# Patient Record
Sex: FEMALE | Race: WHITE | NOT HISPANIC OR LATINO | Employment: OTHER | URBAN - NONMETROPOLITAN AREA
[De-identification: names, ages, dates, MRNs, and addresses within clinical notes are randomized per-mention and may not be internally consistent; named-entity substitution may affect disease eponyms.]

---

## 2017-01-27 ENCOUNTER — HOSPITAL ENCOUNTER (EMERGENCY)
Facility: HOSPITAL | Age: 72
Discharge: HOME | End: 2017-01-27
Payer: MEDICARE

## 2017-01-27 VITALS
RESPIRATION RATE: 16 BRPM | OXYGEN SATURATION: 99 % | TEMPERATURE: 98 F | SYSTOLIC BLOOD PRESSURE: 160 MMHG | DIASTOLIC BLOOD PRESSURE: 90 MMHG | HEART RATE: 62 BPM

## 2017-01-27 DIAGNOSIS — S69.92XA WRIST INJURY, LEFT, INITIAL ENCOUNTER: ICD-10-CM

## 2017-01-27 DIAGNOSIS — S00.83XA FOREHEAD CONTUSION, INITIAL ENCOUNTER: ICD-10-CM

## 2017-01-27 DIAGNOSIS — S01.81XA LACERATION OF FOREHEAD, INITIAL ENCOUNTER: ICD-10-CM

## 2017-01-27 PROCEDURE — 25000125 ZZHC RX 250

## 2017-01-27 PROCEDURE — 25000132 ZZH RX MED GY IP 250 OP 250 PS 637: Mod: GY

## 2017-01-27 PROCEDURE — 73110 X-RAY EXAM OF WRIST: CPT | Mod: TC,LT

## 2017-01-27 PROCEDURE — A9270 NON-COVERED ITEM OR SERVICE: HCPCS | Mod: GY

## 2017-01-27 PROCEDURE — 70450 CT HEAD/BRAIN W/O DYE: CPT | Mod: TC

## 2017-01-27 PROCEDURE — 90471 IMMUNIZATION ADMIN: CPT

## 2017-01-27 PROCEDURE — 90715 TDAP VACCINE 7 YRS/> IM: CPT

## 2017-01-27 PROCEDURE — 99284 EMERGENCY DEPT VISIT MOD MDM: CPT | Mod: 25

## 2017-01-27 PROCEDURE — 12011 RPR F/E/E/N/L/M 2.5 CM/<: CPT

## 2017-01-27 RX ORDER — ACETAMINOPHEN 325 MG/1
975 TABLET ORAL ONCE
Status: COMPLETED | OUTPATIENT
Start: 2017-01-27 | End: 2017-01-27

## 2017-01-27 RX ADMIN — ACETAMINOPHEN 650 MG: 325 TABLET ORAL at 11:01

## 2017-01-27 RX ADMIN — CLOSTRIDIUM TETANI TOXOID ANTIGEN (FORMALDEHYDE INACTIVATED), CORYNEBACTERIUM DIPHTHERIAE TOXOID ANTIGEN (FORMALDEHYDE INACTIVATED), BORDETELLA PERTUSSIS TOXOID ANTIGEN (GLUTARALDEHYDE INACTIVATED), BORDETELLA PERTUSSIS FILAMENTOUS HEMAGGLUTININ ANTIGEN (FORMALDEHYDE INACTIVATED), BORDETELLA PERTUSSIS PERTACTIN ANTIGEN, AND BORDETELLA PERTUSSIS FIMBRIAE 2/3 ANTIGEN 0.5 ML: 5; 2; 2.5; 5; 3; 5 INJECTION, SUSPENSION INTRAMUSCULAR at 11:27

## 2017-01-27 ASSESSMENT — ENCOUNTER SYMPTOMS
HEADACHES: 1
EYE REDNESS: 0
NECK PAIN: 0
COLOR CHANGE: 0
ABDOMINAL PAIN: 0
NECK STIFFNESS: 0
CONFUSION: 0
DIFFICULTY URINATING: 0
SHORTNESS OF BREATH: 0
BACK PAIN: 1
WOUND: 1
ARTHRALGIAS: 1
FEVER: 0

## 2017-01-27 NOTE — ED PROVIDER NOTES
History     Chief Complaint   Patient presents with     Fall     Head Laceration     laceration above right eye     Hand Pain     c/o left hand pain     HPI  Kerri Gonsales is a 71 year old female who presents to ED with granddaughter via private car for evaluation of mechanical trip and fall, head injury, right forehead laceration. Pt states she was at laundry mat this morning, tripped on a ledge, fell face forward, hit head head on the cement, left outstretched arm, wrist pain, left knee pain. She denies LOC, no visual changes, sustained 3 cm contusion and 2 1cm lacerations, left medial wrist pain, 1 cm abrasion on left knee. Denies cp or dizziness that precipitated fall. Denies n/v/d.     I have reviewed the Medications, Allergies, Past Medical and Surgical History, and Social History in the Epic system.    Review of Systems   Constitutional: Negative for fever.   HENT: Negative for congestion.    Eyes: Negative for redness.   Respiratory: Negative for shortness of breath.    Cardiovascular: Negative for chest pain.   Gastrointestinal: Negative for abdominal pain.   Genitourinary: Negative for difficulty urinating.   Musculoskeletal: Positive for back pain and arthralgias. Negative for neck pain and neck stiffness.        Chronic low back pain, left wrist and knee pain. See HPI   Skin: Positive for wound. Negative for color change.        Right forehead contusion, 2 1 cm lacerations, see HPI   Neurological: Positive for headaches.   Psychiatric/Behavioral: Negative for confusion.       Physical Exam   BP: 163/93 mmHg  Heart Rate: 67  Temp: 97.8  F (36.6  C)  Resp: 16  SpO2: 99 %  Physical Exam   Constitutional: She is oriented to person, place, and time. No distress.   HENT:   Head: Head is with contusion and with laceration.       Right Ear: Tympanic membrane normal.   Left Ear: Tympanic membrane normal.   Nose: Nose normal. No mucosal edema or rhinorrhea.   Mouth/Throat: Uvula is midline, oropharynx is clear  and moist and mucous membranes are normal.   Eyes: Conjunctivae are normal. Pupils are equal, round, and reactive to light.   Neck: Normal range of motion. Neck supple.   Cardiovascular: Normal rate and regular rhythm.    Pulmonary/Chest: Effort normal. No respiratory distress.   Abdominal: Soft. There is no tenderness.   Musculoskeletal: She exhibits edema.        Left wrist: She exhibits decreased range of motion, tenderness and swelling. She exhibits no effusion and no laceration.   Neurological: She is alert and oriented to person, place, and time. No cranial nerve deficit.   Skin: Skin is warm and dry. She is not diaphoretic.   Nursing note reviewed.      ED Course   Laceration repair  Date/Time: 1/27/2017 10:42 AM  Performed by: JACK RAMIREZ  Authorized by: JACK RAMIREZ  Consent: Verbal consent obtained.  Consent given by: patient  Patient understanding: patient states understanding of the procedure being performed  Patient identity confirmed: verbally with patient  Body area: head/neck  Location details: forehead  Laceration length: 2 cm  Local anesthetic: lidocaine 1% without epinephrine  Anesthetic total: 5 ml  Irrigation solution: saline and tap water  Amount of cleaning: standard  Skin closure: 5-0 nylon  Number of sutures: 5  Technique: simple  Dressing: 4x4 sterile gauze and antibiotic ointment  Patient tolerance: Patient tolerated the procedure well with no immediate complications      LEFT WRIST FOUR VIEWS    HISTORY:  Pain.    FINDINGS:  Bones are mildly osteopenic.  Joint spaces are well  maintained.  There is mild narrowing at the first carpometacarpal  joint.  No evidence of an acute or subacute fracture.    IMPRESSION:  MILD DEGENERATIVE CHANGES AND OSTEOPENIA WITHOUT EVIDENCE  OF ACUTE OR SUBACUTE ABNORMALITY.  Exam Date: Jan 27, 2017 11:26:42 AM  Author: IZZY ROWELL  This report is final and signed                 CT Head w/o Contrast (Final result) Result time: 01/27/17 12:06:23      Final result by Dickson Sims MD (01/27/17 12:06:23)     Narrative:         CT SCAN OF BRAIN WITHOUT CONTRAST    REPORT:  The ventricular system is normal in size.  There is a cavum  septum pellucidum, a normal variant. There are no masses, ventricular  shifts, or extracerebral collections.  Brainstem and cerebellum appear  normal.  There is complete opacification of the left maxillary sinus,  and near-complete opacification of the right maxillary sinus.  There  is opacified left ethmoid air cells.  Sphenoid and frontal sinuses are  clear.  There is soft tissue swelling seen in the right forehead.  No  fracture is seen in the skull or facial bones.    IMPRESSION:  1.  NO EVIDENCE OF BRAIN INJURY.    2.  SCALP HEMATOMA IN THE RIGHT FRONTAL REGION.  Exam Date: Jan 27, 2017 11:12:53 AM  Author: DICKSON SIMS         Assessments & Plan (with Medical Decision Making)   Pt presents with head injury, forehead laceration, left wrist pain, s/p mechanical trip and fall. VSS, NAD. Exam as above. Laceration repaired per procedure note, tetanus updated. CT head normal. XR wrist shows no wrist fx. Wrist splint applied, tylenol given for pain.   Epic discharge instructions given. Pt discharged in stable condition.     I have reviewed the nursing notes.    I have reviewed the findings, diagnosis, plan and need for follow up with the patient.    There are no discharge medications for this patient.      Final diagnoses:   Forehead contusion, initial encounter   Laceration of forehead, initial encounter   Wrist injury, left, initial encounter     See attached for home care  Rest today  Ice to injured areas - forehead, wrist, knee  Tylenol for pain - may take up to 4000 mg daily   Keep dressing clean, dry and intact for the next 24 hours then change daily  Apply bacitracin oint to wounds - watch for signs of infection  Sutures out in 1 week  Splint to left wrist - slowly increase activity as pain allows - if pain persists in 1  week, f/u with PCP, may need repeat xr  Return to ED with worsening sx.     1/27/2017   [facility]      Zayda Lamas APRN FNP  01/28/17 [id number]

## 2017-01-27 NOTE — PROGRESS NOTES
Kerri I Ilda 71 year old    Returned from X-ray  Exam performed: 4 View Wrist  Tolerated Procedure: well  May resume previous diet: Yes  Pushed to radiologist reading service? Not applicable  Time returned to unit: 11:22  Handoff Method: Call Light on and in reach of patient   Was patient held? NO  If yes, by whom? Technologist NAME Luba Martinez  1/27/2017 11:22 AM  Enrico Bain

## 2017-01-27 NOTE — ED NOTES
Pt presents with c/o a fall and laceration near right eyebrow. Pt also c/o pain to left wrist and knee. Pt states that she fell at the laundromat today. Pt denies taking blood thinners.

## 2017-01-27 NOTE — ED AVS SNAPSHOT
[facility]    [address] [place] [id number]    Phone:  [phone]                                       Kerri Gonsales   MRN: [id number]    Department:  [facility]   Date of Visit:  1/27/2017           After Visit Summary Signature Page     I have received my discharge instructions, and my questions have been answered. I have discussed any challenges I see with this plan with the nurse or doctor.    ..........................................................................................................................................  Patient/Patient Representative Signature      ..........................................................................................................................................  Patient Representative Print Name and Relationship to Patient    ..................................................               ................................................  Date                                            Time    ..........................................................................................................................................  Reviewed by Signature/Title    ...................................................              ..............................................  Date                                                            Time

## 2017-01-27 NOTE — DISCHARGE INSTRUCTIONS
See attached for home care  Rest today  Ice to injured areas - forehead, wrist, knee  Tylenol for pain - may take up to 4000 mg daily   Keep dressing clean, dry and intact for the next 24 hours then change daily  Apply bacitracin oint to wounds - watch for signs of infection  Sutures out in 1 week  Splint to left wrist - slowly increase activity as pain allows - if pain persists in 1 week, f/u with PCP, may need repeat xr  Return to ED with worsening sx.       Bruises (Contusions)    A contusion is a bruise. A bruise happens when a blow to your body doesn't break the skin but does break blood vessels beneath the skin. Blood leaking from the broken vessels causes redness and swelling. As it heals, your bruise is likely to turn colors like purple, green, and yellow. This is normal. The bruise should fade in 2 or 3 weeks.  Factors that make you more likely to bruise  Almost everyone bruises now and then. Certain people do bruise more easily than others. You're more prone to bruising as you get older. That's because blood vessels become more fragile with age. You're also more likely to bruise if you have a clotting disorder such as hemophilia or take medications that reduce clotting, including aspirin.  When to go to the emergency room (ER)  Bruises almost always heal on their own without special treatment. But for some people, a bad bruise can be serious. Seek medical care if you:    Have a clotting disorder such as hemophilia.    Have cirrhosis or other serious liver disease.    Take blood-thinning medications such as warfarin (Coumadin).  What to expect in the ER  A doctor will examine your bruise and ask about any health conditions you have. In some cases, you may have a test to check how well your blood clots. Other treatment will depend on your needs.  Follow-up care  Sometimes a bruise gets worse instead of better. It may become larger and more swollen. This can occur when your body walls off a small pool of  blood under the skin (hematoma). In very rare cases, your doctor may need to drain excess blood from the area.  Tip:  Apply an ice pack or bag of frozen peas to a bruise (keep a thin cloth between the cold source and your skin). This can help reduce redness and swelling.     1717-6829 [employer]. [address] [place] [address], [place] [address]. All rights reserved. This information is not intended as a substitute for professional medical care. Always follow your healthcare professional's instructions.          Facial Contusion with Sleep Monitoring  A contusion is another word for a bruise. It happens when small blood vessels break open and leak blood into the nearby area. A facial contusion can result from a bump, hit, or fall. This may happen during sports or an accident. Symptoms of a contusion often include changes in skin color (bruising), swelling, and pain.   Because the injury was to your face, it could have caused a concussion (mild brain injury). You don't have signs of a concussion at this time. But these signs can show up later. You need someone to wake you up during the night to check for the symptoms of a concussion listed below.  The swelling from the contusion should decrease in a few days. Bruising and pain may take several weeks to go away.   Home care  Sleep monitoring  Someone must stay with you for the next 24 hours (or longer, if directed). This person should wake you up every 2 hours to check for signs of concussion. These include:    Nausea    Vomiting    Dizziness or balance problems    Confusion    Headache    Memory loss    Loss on consciousness    Drowsiness    Irritability    Trouble speaking or communicating    Changes in sleep patterns    Depression  If any of these symptoms develop at any time, get medical care right away. If no concussion symptoms are noted during the first 24 hours, continue watching for symptoms for the next day or so. Ask your provider if someone should  stay with you during this time.   General care    If you have been prescribed medicines for pain, take them as directed.    To help reduce swelling and pain from the contusion, wrap a cold pack or bag of frozen peas in a thin towel. Put it on the injured area for up to 20 minutes. Do this a few times a day until the swelling goes down.     If you have scrapes or cuts on your face requiring stiches or other closures, care for them as directed.    For the next 24 hours (or longer if instructed):    Don t drink alcohol, or use sedatives or medicines that make you sleepy.    Don t drive or operate machinery.    Avoid doing anything strenuous. Don t lift or strain.    Do not return to sports or other activity that could result in another head injury.  Follow-up care  Follow up with your healthcare provider or our staff as directed.   When to seek medical advice  Call your healthcare provider right away if any of these occur:    Signs of concussion listed above (get medical care right away)    Swelling or pain that gets worse, not better    New swelling or pain    Warmth or drainage from the swollen area or from cuts or scrapes    Fluid drainage or bleeding from the nose or ears    Fever of 100.4 F (38 C) or higher, or as directed by your health care provider    2980-1164 [employer]. [address] [place] [address], [place] [address]. All rights reserved. This information is not intended as a substitute for professional medical care. Always follow your healthcare professional's instructions.          Face Laceration: Suture or Tape  A laceration is a cut through the skin. This will require stitches if it is deep. Minor cuts may be treated with surgical tape.    Home care    Your healthcare provider may prescribe an antibiotic. This is to help prevent infection. Follow all instructions for taking this medicine. Take the medicine every day until it is gone or you are told to stop. You should not have any left  over.    The healthcare provider may prescribe medicines for pain. Follow instructions for taking them.    Follow the healthcare provider s instructions on how to care for the cut.    Wash your hands with soap and warm water before and after caring for the cut. This helps prevent infection.    If a bandage was applied and it becomes wet or dirty, replace it. Otherwise, leave it in place for the first 24 hours, then change it once a day or as directed.    If sutures were used, clean the wound daily:    After removing the bandage, wash the area with soap and water. Use a wet cotton swab to loosen and remove any blood or crust that forms.    After cleaning, keep the wound clean and dry. Talk with your doctor before applying any antibiotic ointment to the wound. Reapply a fresh bandage.    You may remove the bandage to shower as usual after the first 24 hours, but do not soak the area in water (no swimming) until the sutures are removed.    If surgical tape was used, keep the area clean and dry. If it becomes wet, blot it dry with a towel.    Most facial skin wounds heal without problems. However, an infection sometimes occurs despite proper treatment. Therefore, watch for the signs of infection listed below.  Follow-up care  Follow up with your healthcare provider as advised. Be sure to return for suture removal as directed. Ask your provider how long sutures should remain in place. If surgical tape closures were used, you may remove them yourself when your provider recommends if they have not fallen off on their own.  When to seek medical advice  Call your healthcare provider right away if any of these occur:    Wound bleeding not controlled by direct pressure    Signs of infection, including increasing pain in the wound, increasing wound redness or swelling, or pus or bad odor coming from the wound    Fever of 100.4 F (38 C) or higher or as directed by your healthcare provider    Stitches come apart or fall out or  surgical tape falls off before 5 days    Wound edges re-open    Wound changes colors    Numbness around the wound     [id number] [employer]. [address] [place] [address], [place] [address]. All rights reserved. This information is not intended as a substitute for professional medical care. Always follow your healthcare professional's instructions.

## 2017-01-27 NOTE — ED AVS SNAPSHOT
[facility]    [address] [address]    Phone:  [phone]                                       Kerri Gonsales   MRN: [id number]    Department:  [facility]   Date of Visit:  1/27/2017           Patient Information     Date Of Birth          1945        Your diagnoses for this visit were:     Forehead contusion, initial encounter     Laceration of forehead, initial encounter     Wrist injury, left, initial encounter        You were seen by Zayda Lamas APRN FNP.      Follow-up Information     Follow up with Telly Bardales In 1 week.    Specialty:  Family Practice    Why:  For wound re-check, For suture removal    Contact information:    [facility]  [address] [place] [address] [phone]          Follow up with [facility].    Specialty:  EMERGENCY MEDICINE    Why:  As needed, If symptoms worsen    Contact information:    [address] [address] [phone]    Additional information:    From [place]: Take [address]. Turn left at [address]. Turn left at the first stoplight on [address] [place] [address]. At the first stop sign, take a right onto [facility]. Take a left into the parking lot and continue through until you reach the North enterance of the building.       From [place]: Take [address]. Take the [address] ramp towards [place]. Turn left onto [address]. Take a slight right onto [address] [facility]. Turn left at the first stoplight on [address]. At the first stop sign, take a right onto [address]. Take a left into the parking lot and continue through until you reach the North enterance of the building.       From [place]: Take [address]. Take a right at [address] [facility] [address]. At the first stop sign, take a right onto [address]. Take a left into the parking lot and continue through until you reach the North enterance of the  building.         Discharge Instructions       See attached for home care  Rest today  Ice to injured areas - forehead, wrist, knee  Tylenol for pain - may take up to 4000 mg daily   Keep dressing clean, dry and intact for the next 24 hours then change daily  Apply bacitracin oint to wounds - watch for signs of infection  Sutures out in 1 week  Splint to left wrist - slowly increase activity as pain allows - if pain persists in 1 week, f/u with PCP, may need repeat xr  Return to ED with worsening sx.       Bruises (Contusions)    A contusion is a bruise. A bruise happens when a blow to your body doesn't break the skin but does break blood vessels beneath the skin. Blood leaking from the broken vessels causes redness and swelling. As it heals, your bruise is likely to turn colors like purple, green, and yellow. This is normal. The bruise should fade in 2 or 3 weeks.  Factors that make you more likely to bruise  Almost everyone bruises now and then. Certain people do bruise more easily than others. You're more prone to bruising as you get older. That's because blood vessels become more fragile with age. You're also more likely to bruise if you have a clotting disorder such as hemophilia or take medications that reduce clotting, including aspirin.  When to go to the emergency room (ER)  Bruises almost always heal on their own without special treatment. But for some people, a bad bruise can be serious. Seek medical care if you:    Have a clotting disorder such as hemophilia.    Have cirrhosis or other serious liver disease.    Take blood-thinning medications such as warfarin (Coumadin).  What to expect in the ER  A doctor will examine your bruise and ask about any health conditions you have. In some cases, you may have a test to check how well your blood clots. Other treatment will depend on your needs.  Follow-up care  Sometimes a bruise gets worse instead of better. It may become larger and more swollen. This can  occur when your body walls off a small pool of blood under the skin (hematoma). In very rare cases, your doctor may need to drain excess blood from the area.  Tip:  Apply an ice pack or bag of frozen peas to a bruise (keep a thin cloth between the cold source and your skin). This can help reduce redness and swelling.     9522-8347 [employer]. [address] [place] [address], [place] [address]. All rights reserved. This information is not intended as a substitute for professional medical care. Always follow your healthcare professional's instructions.          Facial Contusion with Sleep Monitoring  A contusion is another word for a bruise. It happens when small blood vessels break open and leak blood into the nearby area. A facial contusion can result from a bump, hit, or fall. This may happen during sports or an accident. Symptoms of a contusion often include changes in skin color (bruising), swelling, and pain.   Because the injury was to your face, it could have caused a concussion (mild brain injury). You don't have signs of a concussion at this time. But these signs can show up later. You need someone to wake you up during the night to check for the symptoms of a concussion listed below.  The swelling from the contusion should decrease in a few days. Bruising and pain may take several weeks to go away.   Home care  Sleep monitoring  Someone must stay with you for the next 24 hours (or longer, if directed). This person should wake you up every 2 hours to check for signs of concussion. These include:    Nausea    Vomiting    Dizziness or balance problems    Confusion    Headache    Memory loss    Loss on consciousness    Drowsiness    Irritability    Trouble speaking or communicating    Changes in sleep patterns    Depression  If any of these symptoms develop at any time, get medical care right away. If no concussion symptoms are noted during the first 24 hours, continue watching for symptoms for the next  day or so. Ask your provider if someone should stay with you during this time.   General care    If you have been prescribed medicines for pain, take them as directed.    To help reduce swelling and pain from the contusion, wrap a cold pack or bag of frozen peas in a thin towel. Put it on the injured area for up to 20 minutes. Do this a few times a day until the swelling goes down.     If you have scrapes or cuts on your face requiring stiches or other closures, care for them as directed.    For the next 24 hours (or longer if instructed):    Don t drink alcohol, or use sedatives or medicines that make you sleepy.    Don t drive or operate machinery.    Avoid doing anything strenuous. Don t lift or strain.    Do not return to sports or other activity that could result in another head injury.  Follow-up care  Follow up with your healthcare provider or our staff as directed.   When to seek medical advice  Call your healthcare provider right away if any of these occur:    Signs of concussion listed above (get medical care right away)    Swelling or pain that gets worse, not better    New swelling or pain    Warmth or drainage from the swollen area or from cuts or scrapes    Fluid drainage or bleeding from the nose or ears    Fever of 100.4 F (38 C) or higher, or as directed by your health care provider    2106-0182 [employer]. [address] [place] [address]. All rights reserved. This information is not intended as a substitute for professional medical care. Always follow your healthcare professional's instructions.          Face Laceration: Suture or Tape  A laceration is a cut through the skin. This will require stitches if it is deep. Minor cuts may be treated with surgical tape.    Home care    Your healthcare provider may prescribe an antibiotic. This is to help prevent infection. Follow all instructions for taking this medicine. Take the medicine every day until it is gone or you are told  to stop. You should not have any left over.    The healthcare provider may prescribe medicines for pain. Follow instructions for taking them.    Follow the healthcare provider s instructions on how to care for the cut.    Wash your hands with soap and warm water before and after caring for the cut. This helps prevent infection.    If a bandage was applied and it becomes wet or dirty, replace it. Otherwise, leave it in place for the first 24 hours, then change it once a day or as directed.    If sutures were used, clean the wound daily:    After removing the bandage, wash the area with soap and water. Use a wet cotton swab to loosen and remove any blood or crust that forms.    After cleaning, keep the wound clean and dry. Talk with your doctor before applying any antibiotic ointment to the wound. Reapply a fresh bandage.    You may remove the bandage to shower as usual after the first 24 hours, but do not soak the area in water (no swimming) until the sutures are removed.    If surgical tape was used, keep the area clean and dry. If it becomes wet, blot it dry with a towel.    Most facial skin wounds heal without problems. However, an infection sometimes occurs despite proper treatment. Therefore, watch for the signs of infection listed below.  Follow-up care  Follow up with your healthcare provider as advised. Be sure to return for suture removal as directed. Ask your provider how long sutures should remain in place. If surgical tape closures were used, you may remove them yourself when your provider recommends if they have not fallen off on their own.  When to seek medical advice  Call your healthcare provider right away if any of these occur:    Wound bleeding not controlled by direct pressure    Signs of infection, including increasing pain in the wound, increasing wound redness or swelling, or pus or bad odor coming from the wound    Fever of 100.4 F (38 C) or higher or as directed by your healthcare  "provider    Stitches come apart or fall out or surgical tape falls off before 5 days    Wound edges re-open    Wound changes colors    Numbness around the wound     2816-1793 [employer]. [address] [place] [address], [place] [address]. All rights reserved. This information is not intended as a substitute for professional medical care. Always follow your healthcare professional's instructions.             Review of your medicines      Notice     You have not been prescribed any medications.            Procedures and tests performed during your visit     CT Head w/o Contrast    Laceration repair    Splint    Wrist XR, G/E 3 views, left      Orders Needing Specimen Collection     None      Pending Results     Date and Time Order Name Status Description    2017 1034 Wrist XR, G/E 3 views, left In process     2017 1034 CT Head w/o Contrast In process             Pending Culture Results     No orders found from 2017 to 2017.            Thank you for choosing [facility]       Thank you for choosing [facility] for your care. Our goal is always to provide you with excellent care. Hearing back from our patients is one way we can continue to improve our services. Please take a few minutes to complete the written survey that you may receive in the mail after you visit with us. Thank you!        [employer] Information     [employer] lets you send messages to your doctor, view your test results, renew your prescriptions, schedule appointments and more. To sign up, go to [email or web address] . Click on \"Log in\" on the left side of the screen, which will take you to the Welcome page. Then click on \"Sign up Now\" on the right side of the page.     You will be asked to enter the access code listed below, as well as some personal information. Please follow the directions to create your username and password.     Your access code is: [email or web address]  Expires: 2017 11:44 AM     Your access code will  in 90 " days. If you need help or a new code, please call your [facility] clinic or [phone].        Care EveryWhere ID     This is your Care EveryWhere ID. This could be used by other organizations to access your [facility] medical records  [id number]        After Visit Summary       This is your record. Keep this with you and show to your community pharmacist(s) and doctor(s) at your next visit.

## 2017-01-27 NOTE — ED NOTES
Discharge instructions reviewed with patient, and patient verbalized understanding. Pt ambulated with a steady gait to the exit.

## 2017-01-28 ENCOUNTER — HOSPITAL ENCOUNTER (EMERGENCY)
Facility: HOSPITAL | Age: 72
Discharge: HOME | End: 2017-01-28
Attending: PHYSICIAN ASSISTANT | Admitting: PHYSICIAN ASSISTANT
Payer: MEDICARE

## 2017-01-28 VITALS
SYSTOLIC BLOOD PRESSURE: 159 MMHG | TEMPERATURE: 97.9 F | HEART RATE: 74 BPM | OXYGEN SATURATION: 98 % | RESPIRATION RATE: 16 BRPM | HEIGHT: 67 IN | DIASTOLIC BLOOD PRESSURE: 67 MMHG | BODY MASS INDEX: 29.82 KG/M2 | WEIGHT: 190 LBS

## 2017-01-28 DIAGNOSIS — S01.81XD LACERATION OF FOREHEAD, SUBSEQUENT ENCOUNTER: ICD-10-CM

## 2017-01-28 DIAGNOSIS — S09.90XD CLOSED HEAD INJURY, SUBSEQUENT ENCOUNTER: ICD-10-CM

## 2017-01-28 PROCEDURE — 99203 OFFICE O/P NEW LOW 30 MIN: CPT | Performed by: PHYSICIAN ASSISTANT

## 2017-01-28 PROCEDURE — 99212 OFFICE O/P EST SF 10 MIN: CPT

## 2017-01-28 RX ORDER — MECLIZINE HYDROCHLORIDE 25 MG/1
25 TABLET ORAL PRN
COMMUNITY

## 2017-01-28 ASSESSMENT — ENCOUNTER SYMPTOMS
VOMITING: 0
LIGHT-HEADEDNESS: 0
CONSTITUTIONAL NEGATIVE: 1
HEADACHES: 0
AGITATION: 0
WOUND: 1
CONFUSION: 0
NAUSEA: 0
DIZZINESS: 0
CARDIOVASCULAR NEGATIVE: 1

## 2017-01-28 NOTE — ED AVS SNAPSHOT
[facility]    [address] [address]    Phone:  [phone]                                       Kerri Gonsales   MRN: [id number]    Department[facility]  [facility]   Date of Visit:  1/28/2017           Patient Information     Date Of Birth          1945        Your diagnoses for this visit were:     Closed head injury, subsequent encounter     Laceration of forehead, subsequent encounter        You were seen by Zaynab Pennington PA.      Follow-up Information     Follow up with [facility].    Specialty:  EMERGENCY MEDICINE    Why:  If nausea/vomitting, change in behavior/vision, or if concerns develops    Contact information:    [address] [address] [phone]    Additional information:    From [facility]: Take [address]. Turn left at [address]. Turn left at the first stoplight on [address]. At the first stop sign, take a right onto [address]. Take a left into the parking lot and continue through until you reach the North enterance of the building.       From [place]: Take [address]. Take the [address] ramp towards [place]. Turn left onto [address]. Take a slight right onto [address]. Turn left at the first stoplight on [address]. At the first stop sign, take a right onto [address]. Take a left into the parking lot and continue through until you reach the North enterance of the building.       From [place]: Take [address]. Take a right at [facility] [address]. At the first stop sign, take a right onto [address]. Take a left into the parking lot and continue through until you reach the North enterance of the building.       Discharge References/Attachments     WOUND CARE (ENGLISH)    HEAD INJURY, NO WAKE-UP (ADULT) (ENGLISH)         Review of your medicines      Our records show that you are taking the medicines listed below. If these  "are incorrect, please call your family doctor or clinic.        Dose / Directions Last dose taken    HYDROCHLOROTHIAZIDE PO   Dose:  25 mg        Take 25 mg by mouth daily   Refills:  0        INDERAL PO   Dose:  80 mg        Take 80 mg by mouth 3 times daily   Refills:  0        MECLIZINE HCL PO   Dose:  25 mg        Take 25 mg by mouth as needed for dizziness   Refills:  0        PROAIR HFA IN   Dose:  2 puff        Inhale 2 puffs into the lungs every 4 hours as needed   Refills:  0                Orders Needing Specimen Collection     None      Pending Results     No orders found from 2017 to 2017.            Pending Culture Results     No orders found from 2017 to 2017.            Thank you for choosing [facility]       Thank you for choosing [facility] for your care. Our goal is always to provide you with excellent care. Hearing back from our patients is one way we can continue to improve our services. Please take a few minutes to complete the written survey that you may receive in the mail after you visit with us. Thank you!        [employer] Information     [employer] lets you send messages to your doctor, view your test results, renew your prescriptions, schedule appointments and more. To sign up, go to [email or web address] . Click on \"Log in\" on the left side of the screen, which will take you to the Welcome page. Then click on \"Sign up Now\" on the right side of the page.     You will be asked to enter the access code listed below, as well as some personal information. Please follow the directions to create your username and password.     Your access code is: [email or web address]  Expires: 2017 11:44 AM     Your access code will  in 90 days. If you need help or a new code, please call your [facility] clinic or [phone].        Care EveryWhere ID     This is your Care EveryWhere ID. This could be used by other organizations to access your [facility] medical records  [id number]        After " Visit Summary       This is your record. Keep this with you and show to your community pharmacist(s) and doctor(s) at your next visit.

## 2017-01-28 NOTE — ED AVS SNAPSHOT
[facility]    [address] [place] [address]    Phone:  [phone]                                       Kerri Gonsales   MRN: [id number]    Department[facility]  [facility]   Date of Visit:  1/28/2017           After Visit Summary Signature Page     I have received my discharge instructions, and my questions have been answered. I have discussed any challenges I see with this plan with the nurse or doctor.    ..........................................................................................................................................  Patient/Patient Representative Signature      ..........................................................................................................................................  Patient Representative Print Name and Relationship to Patient    ..................................................               ................................................  Date                                            Time    ..........................................................................................................................................  Reviewed by Signature/Title    ...................................................              ..............................................  Date                                                            Time

## 2017-01-28 NOTE — PROGRESS NOTES
Quick Note:    CT Head w/o Contrast -FINAL-  Impression:  1. No evidence of brain injury.  2. Scalp hematoma in the right frontal region.    5 sutures placed, removal in 1 week. Tdap given. Pt to f/u with PCP in 1 week. Results faxed to PCP, Dr. Telly Bardales at [phone]; fax confirmation received.  ______

## 2017-01-29 NOTE — ED PROVIDER NOTES
"  History     Chief Complaint   Patient presents with     Facial Swelling     \"swelling under right eye from a fall yesterday, was seen in ED yesterday and had stitches above right eyebrow, had radiology tests done yesterday.\"      The history is provided by the patient. No  was used.     Kerri Gonsales is a 71 year old female who has swelling under right eye since yesterday. Pt had fallen and went to the ED. Had a negative head CT. Had stitches above the right eyebrow    Allergies as of 01/28/2017 - Review Complete 01/28/2017   Allergen Reaction Noted     Aspirin  01/28/2017     Patient's Medications   New Prescriptions    No medications on file   Previous Medications    ALBUTEROL SULFATE (PROAIR HFA IN)    Inhale 2 puffs into the lungs every 4 hours as needed    HYDROCHLOROTHIAZIDE PO    Take 25 mg by mouth daily    MECLIZINE HCL PO    Take 25 mg by mouth as needed for dizziness    PROPRANOLOL HCL (INDERAL PO)    Take 80 mg by mouth 3 times daily   Modified Medications    No medications on file   Discontinued Medications    No medications on file       PMH: not pertinent  PSH: not pertinent  FHX: not pertinent      Social History     Social History     Marital Status:      Spouse Name: N/A     Number of Children: N/A     Years of Education: N/A     Social History Main Topics     Smoking status: None     Smokeless tobacco: None     Alcohol Use: None     Drug Use: None     Sexual Activity: Not Asked     Other Topics Concern     None     Social History Narrative     None       I have reviewed the Medications, Allergies, Past Medical and Surgical History, and Social History in the Epic system.    Review of Systems   Constitutional: Negative.    Cardiovascular: Negative.    Gastrointestinal: Negative for nausea and vomiting.   Skin: Positive for wound.   Neurological: Negative for dizziness, light-headedness and headaches.   Psychiatric/Behavioral: Negative for behavioral problems, " "confusion and agitation.       Physical Exam   BP: 159/67 mmHg  Pulse: 74  Temp: 97.9  F (36.6  C)  Resp: 16  Height: 170.2 cm (5' 7\")  Weight: 86.183 kg (190 lb)  SpO2: 98 %  Physical Exam   Constitutional: She is oriented to person, place, and time. She appears well-developed and well-nourished. No distress.   HENT:   Head: Normocephalic and atraumatic.   Eyes: Conjunctivae and EOM are normal. Pupils are equal, round, and reactive to light. Right eye exhibits no discharge. Left eye exhibits no discharge.   Right side of forehead with healing laceration.  7 sutures intact. Wound edges approximated well. No e/e/e/e.   There is a developing area of ecchymosis under right eye, with moderate edema.   Cardiovascular: Normal rate.    Pulmonary/Chest: Effort normal.   Neurological: She is alert and oriented to person, place, and time. No cranial nerve deficit. Coordination normal.   Skin: She is not diaphoretic.   Psychiatric: She has a normal mood and affect. Her speech is normal and behavior is normal. Cognition and memory are normal.   Nursing note and vitals reviewed.      ED Course   Procedures         1/27/17 10:56 AM 1/27/17 11:12 AM [id number]        PACS Images      Show images for CT Head w/o Contrast       Study Result            CT SCAN OF BRAIN WITHOUT CONTRAST     REPORT:  The ventricular system is normal in size.  There is a cavum  septum pellucidum, a normal variant. There are no masses, ventricular  shifts, or extracerebral collections.  Brainstem and cerebellum appear  normal.  There is complete opacification of the left maxillary sinus,  and near-complete opacification of the right maxillary sinus.  There  is opacified left ethmoid air cells.  Sphenoid and frontal sinuses are  clear.  There is soft tissue swelling seen in the right forehead.  No  fracture is seen in the skull or facial bones.     IMPRESSION:  1.  NO EVIDENCE OF BRAIN INJURY.     2.  SCALP HEMATOMA IN THE RIGHT FRONTAL REGION.  Exam Date: " Jan 27, 2017 11:12:53 AM  Author: JADA SIMS  This report is final and signed            Assessments & Plan (with Medical Decision Making)     I have reviewed the nursing notes.    I have reviewed the findings, diagnosis, plan and need for follow up with the patient.    Final diagnoses:   Closed head injury, subsequent encounter   Laceration of forehead, subsequent encounter       Keep area clean dry  Have sutures removed as already directed  Patient verbally educated and given appropriate education sheets for the diagnoses and has no questions.  Take current medications as directed.   Follow up with ED if n/v, change in behavior/vision,  if symptoms increase or if concerns develop, return to the ER  Zaynab Pennington Certified  Physician Assistant  1/28/2017  8:24 PM  URGENT CARE CLINIC      1/28/2017   [facility]      Zaynab Pennington PA  01/28/17 2026

## 2017-01-29 NOTE — ED NOTES
Patient fell at [facility] & came in to ER had stiches placed on above right eye.Now today there is significant swelling below eye lid. Patient is concern what she should do. Pain 4/10. Taking tylenol for pain.

## 2021-02-12 ENCOUNTER — HOSPITAL ENCOUNTER (INPATIENT)
Facility: HOSPITAL | Age: 76
LOS: 2 days | Discharge: HOME | DRG: 308 | End: 2021-02-14
Attending: PHYSICIAN ASSISTANT | Admitting: INTERNAL MEDICINE
Payer: MEDICARE

## 2021-02-12 ENCOUNTER — APPOINTMENT (OUTPATIENT)
Dept: CT IMAGING | Facility: HOSPITAL | Age: 76
DRG: 308 | End: 2021-02-12
Attending: PHYSICIAN ASSISTANT
Payer: MEDICARE

## 2021-02-12 ENCOUNTER — APPOINTMENT (OUTPATIENT)
Dept: GENERAL RADIOLOGY | Facility: HOSPITAL | Age: 76
DRG: 308 | End: 2021-02-12
Attending: PHYSICIAN ASSISTANT
Payer: MEDICARE

## 2021-02-12 DIAGNOSIS — E83.42 HYPOMAGNESEMIA: ICD-10-CM

## 2021-02-12 DIAGNOSIS — E87.6 HYPOKALEMIA: ICD-10-CM

## 2021-02-12 DIAGNOSIS — I48.91 ATRIAL FIBRILLATION WITH RAPID VENTRICULAR RESPONSE (H): ICD-10-CM

## 2021-02-12 PROBLEM — I10 BENIGN ESSENTIAL HYPERTENSION: Status: ACTIVE | Noted: 2021-02-12

## 2021-02-12 PROBLEM — I50.31 ACUTE DIASTOLIC HEART FAILURE (H): Status: ACTIVE | Noted: 2021-02-12

## 2021-02-12 PROBLEM — J41.0 SIMPLE CHRONIC BRONCHITIS (H): Status: ACTIVE | Noted: 2021-02-12

## 2021-02-12 LAB
ALBUMIN SERPL-MCNC: 3.5 G/DL (ref 3.4–5)
ALP SERPL-CCNC: 72 U/L (ref 40–150)
ALT SERPL W P-5'-P-CCNC: 17 U/L (ref 0–50)
ANION GAP SERPL CALCULATED.3IONS-SCNC: 6 MMOL/L (ref 3–14)
AST SERPL W P-5'-P-CCNC: 15 U/L (ref 0–45)
BASOPHILS # BLD AUTO: 0.1 10E9/L (ref 0–0.2)
BASOPHILS NFR BLD AUTO: 0.8 %
BILIRUB SERPL-MCNC: 0.7 MG/DL (ref 0.2–1.3)
BUN SERPL-MCNC: 17 MG/DL (ref 7–30)
CALCIUM SERPL-MCNC: 8.9 MG/DL (ref 8.5–10.1)
CHLORIDE SERPL-SCNC: 100 MMOL/L (ref 94–109)
CO2 SERPL-SCNC: 30 MMOL/L (ref 20–32)
CREAT SERPL-MCNC: 0.78 MG/DL (ref 0.52–1.04)
CRP SERPL-MCNC: 18.1 MG/L (ref 0–8)
DIFFERENTIAL METHOD BLD: NORMAL
EOSINOPHIL # BLD AUTO: 0.3 10E9/L (ref 0–0.7)
EOSINOPHIL NFR BLD AUTO: 4.1 %
ERYTHROCYTE [DISTWIDTH] IN BLOOD BY AUTOMATED COUNT: 13.2 % (ref 10–15)
FLUAV RNA RESP QL NAA+PROBE: NEGATIVE
FLUBV RNA RESP QL NAA+PROBE: NEGATIVE
GFR SERPL CREATININE-BSD FRML MDRD: 73 ML/MIN/{1.73_M2}
GLUCOSE SERPL-MCNC: 138 MG/DL (ref 70–99)
HCT VFR BLD AUTO: 39.4 % (ref 35–47)
HGB BLD-MCNC: 13.8 G/DL (ref 11.7–15.7)
IMM GRANULOCYTES # BLD: 0 10E9/L (ref 0–0.4)
IMM GRANULOCYTES NFR BLD: 0.3 %
LABORATORY COMMENT REPORT: NORMAL
LYMPHOCYTES # BLD AUTO: 2 10E9/L (ref 0.8–5.3)
LYMPHOCYTES NFR BLD AUTO: 31.9 %
MAGNESIUM SERPL-MCNC: 1.5 MG/DL (ref 1.6–2.3)
MAGNESIUM SERPL-MCNC: 2.2 MG/DL (ref 1.6–2.3)
MCH RBC QN AUTO: 31 PG (ref 26.5–33)
MCHC RBC AUTO-ENTMCNC: 35 G/DL (ref 31.5–36.5)
MCV RBC AUTO: 89 FL (ref 78–100)
MONOCYTES # BLD AUTO: 0.6 10E9/L (ref 0–1.3)
MONOCYTES NFR BLD AUTO: 9.9 %
NEUTROPHILS # BLD AUTO: 3.3 10E9/L (ref 1.6–8.3)
NEUTROPHILS NFR BLD AUTO: 53 %
NRBC # BLD AUTO: 0 10*3/UL
NRBC BLD AUTO-RTO: 0 /100
NT-PROBNP SERPL-MCNC: 6001 PG/ML (ref 0–1800)
PLATELET # BLD AUTO: 249 10E9/L (ref 150–450)
POTASSIUM SERPL-SCNC: 2.8 MMOL/L (ref 3.4–5.3)
POTASSIUM SERPL-SCNC: 3.5 MMOL/L (ref 3.4–5.3)
PROT SERPL-MCNC: 7.5 G/DL (ref 6.8–8.8)
RBC # BLD AUTO: 4.45 10E12/L (ref 3.8–5.2)
RSV RNA SPEC QL NAA+PROBE: NEGATIVE
SARS-COV-2 RNA RESP QL NAA+PROBE: NEGATIVE
SODIUM SERPL-SCNC: 136 MMOL/L (ref 133–144)
SPECIMEN SOURCE: NORMAL
TROPONIN I SERPL-MCNC: <0.015 UG/L (ref 0–0.04)
TROPONIN I SERPL-MCNC: <0.015 UG/L (ref 0–0.04)
WBC # BLD AUTO: 6.3 10E9/L (ref 4–11)

## 2021-02-12 PROCEDURE — 93005 ELECTROCARDIOGRAM TRACING: CPT

## 2021-02-12 PROCEDURE — 80053 COMPREHEN METABOLIC PANEL: CPT | Performed by: PHYSICIAN ASSISTANT

## 2021-02-12 PROCEDURE — 83735 ASSAY OF MAGNESIUM: CPT | Performed by: INTERNAL MEDICINE

## 2021-02-12 PROCEDURE — 250N000009 HC RX 250: Performed by: PHYSICIAN ASSISTANT

## 2021-02-12 PROCEDURE — 71275 CT ANGIOGRAPHY CHEST: CPT

## 2021-02-12 PROCEDURE — 250N000011 HC RX IP 250 OP 636: Performed by: PHYSICIAN ASSISTANT

## 2021-02-12 PROCEDURE — 84132 ASSAY OF SERUM POTASSIUM: CPT | Performed by: INTERNAL MEDICINE

## 2021-02-12 PROCEDURE — 99285 EMERGENCY DEPT VISIT HI MDM: CPT | Mod: 25

## 2021-02-12 PROCEDURE — 36415 COLL VENOUS BLD VENIPUNCTURE: CPT | Performed by: INTERNAL MEDICINE

## 2021-02-12 PROCEDURE — 96375 TX/PRO/DX INJ NEW DRUG ADDON: CPT

## 2021-02-12 PROCEDURE — 71045 X-RAY EXAM CHEST 1 VIEW: CPT

## 2021-02-12 PROCEDURE — 87636 SARSCOV2 & INF A&B AMP PRB: CPT | Performed by: PHYSICIAN ASSISTANT

## 2021-02-12 PROCEDURE — 83735 ASSAY OF MAGNESIUM: CPT | Performed by: PHYSICIAN ASSISTANT

## 2021-02-12 PROCEDURE — 85025 COMPLETE CBC W/AUTO DIFF WBC: CPT | Performed by: PHYSICIAN ASSISTANT

## 2021-02-12 PROCEDURE — 84484 ASSAY OF TROPONIN QUANT: CPT | Performed by: INTERNAL MEDICINE

## 2021-02-12 PROCEDURE — 93010 ELECTROCARDIOGRAM REPORT: CPT | Mod: 76 | Performed by: INTERNAL MEDICINE

## 2021-02-12 PROCEDURE — 84484 ASSAY OF TROPONIN QUANT: CPT | Performed by: PHYSICIAN ASSISTANT

## 2021-02-12 PROCEDURE — C9803 HOPD COVID-19 SPEC COLLECT: HCPCS

## 2021-02-12 PROCEDURE — 96365 THER/PROPH/DIAG IV INF INIT: CPT

## 2021-02-12 PROCEDURE — 120N000001 HC R&B MED SURG/OB

## 2021-02-12 PROCEDURE — 99222 1ST HOSP IP/OBS MODERATE 55: CPT | Mod: AI | Performed by: INTERNAL MEDICINE

## 2021-02-12 PROCEDURE — 99284 EMERGENCY DEPT VISIT MOD MDM: CPT | Performed by: PHYSICIAN ASSISTANT

## 2021-02-12 PROCEDURE — 250N000013 HC RX MED GY IP 250 OP 250 PS 637: Performed by: PHYSICIAN ASSISTANT

## 2021-02-12 PROCEDURE — 83880 ASSAY OF NATRIURETIC PEPTIDE: CPT | Performed by: PHYSICIAN ASSISTANT

## 2021-02-12 PROCEDURE — 86140 C-REACTIVE PROTEIN: CPT | Performed by: INTERNAL MEDICINE

## 2021-02-12 PROCEDURE — 96376 TX/PRO/DX INJ SAME DRUG ADON: CPT

## 2021-02-12 RX ORDER — IOPAMIDOL 755 MG/ML
100 INJECTION, SOLUTION INTRAVASCULAR ONCE
Status: COMPLETED | OUTPATIENT
Start: 2021-02-12 | End: 2021-02-12

## 2021-02-12 RX ORDER — ACETAMINOPHEN 325 MG/1
650 TABLET ORAL EVERY 4 HOURS PRN
Status: DISCONTINUED | OUTPATIENT
Start: 2021-02-12 | End: 2021-02-14 | Stop reason: HOSPADM

## 2021-02-12 RX ORDER — ONDANSETRON 4 MG/1
4 TABLET, ORALLY DISINTEGRATING ORAL EVERY 6 HOURS PRN
Status: DISCONTINUED | OUTPATIENT
Start: 2021-02-12 | End: 2021-02-14 | Stop reason: HOSPADM

## 2021-02-12 RX ORDER — PROCHLORPERAZINE 25 MG
12.5 SUPPOSITORY, RECTAL RECTAL EVERY 12 HOURS PRN
Status: DISCONTINUED | OUTPATIENT
Start: 2021-02-12 | End: 2021-02-14 | Stop reason: HOSPADM

## 2021-02-12 RX ORDER — DILTIAZEM HYDROCHLORIDE 5 MG/ML
10 INJECTION INTRAVENOUS ONCE
Status: COMPLETED | OUTPATIENT
Start: 2021-02-12 | End: 2021-02-12

## 2021-02-12 RX ORDER — POTASSIUM CHLORIDE 20MEQ/15ML
20 LIQUID (ML) ORAL ONCE
Status: COMPLETED | OUTPATIENT
Start: 2021-02-12 | End: 2021-02-12

## 2021-02-12 RX ORDER — PROCHLORPERAZINE MALEATE 5 MG
5 TABLET ORAL EVERY 6 HOURS PRN
Status: DISCONTINUED | OUTPATIENT
Start: 2021-02-12 | End: 2021-02-14 | Stop reason: HOSPADM

## 2021-02-12 RX ORDER — POTASSIUM CHLORIDE 7.45 MG/ML
10 INJECTION INTRAVENOUS ONCE
Status: COMPLETED | OUTPATIENT
Start: 2021-02-12 | End: 2021-02-12

## 2021-02-12 RX ORDER — GENTAMICIN SULFATE 3 MG/ML
2 SOLUTION/ DROPS OPHTHALMIC EVERY 4 HOURS PRN
COMMUNITY
Start: 2020-05-06

## 2021-02-12 RX ORDER — METOPROLOL TARTRATE 25 MG/1
25 TABLET, FILM COATED ORAL 2 TIMES DAILY
Status: DISCONTINUED | OUTPATIENT
Start: 2021-02-12 | End: 2021-02-13

## 2021-02-12 RX ORDER — LIDOCAINE 40 MG/G
CREAM TOPICAL
Status: DISCONTINUED | OUTPATIENT
Start: 2021-02-12 | End: 2021-02-14 | Stop reason: HOSPADM

## 2021-02-12 RX ORDER — FUROSEMIDE 10 MG/ML
20 INJECTION INTRAMUSCULAR; INTRAVENOUS ONCE
Status: COMPLETED | OUTPATIENT
Start: 2021-02-12 | End: 2021-02-13

## 2021-02-12 RX ORDER — IPRATROPIUM BROMIDE AND ALBUTEROL SULFATE 2.5; .5 MG/3ML; MG/3ML
3 SOLUTION RESPIRATORY (INHALATION)
Status: DISCONTINUED | OUTPATIENT
Start: 2021-02-12 | End: 2021-02-13

## 2021-02-12 RX ORDER — MAGNESIUM SULFATE HEPTAHYDRATE 40 MG/ML
2 INJECTION, SOLUTION INTRAVENOUS ONCE
Status: COMPLETED | OUTPATIENT
Start: 2021-02-12 | End: 2021-02-12

## 2021-02-12 RX ORDER — ONDANSETRON 2 MG/ML
4 INJECTION INTRAMUSCULAR; INTRAVENOUS EVERY 6 HOURS PRN
Status: DISCONTINUED | OUTPATIENT
Start: 2021-02-12 | End: 2021-02-14 | Stop reason: HOSPADM

## 2021-02-12 RX ORDER — METOPROLOL TARTRATE 1 MG/ML
5 INJECTION, SOLUTION INTRAVENOUS ONCE
Status: COMPLETED | OUTPATIENT
Start: 2021-02-12 | End: 2021-02-13

## 2021-02-12 RX ORDER — ALBUTEROL SULFATE 0.83 MG/ML
2.5 SOLUTION RESPIRATORY (INHALATION) EVERY 6 HOURS PRN
COMMUNITY
End: 2022-05-04

## 2021-02-12 RX ORDER — CEFTRIAXONE SODIUM 1 G/50ML
1 INJECTION, SOLUTION INTRAVENOUS ONCE
Status: COMPLETED | OUTPATIENT
Start: 2021-02-12 | End: 2021-02-12

## 2021-02-12 RX ADMIN — POTASSIUM CHLORIDE 20 MEQ: 20 SOLUTION ORAL at 17:44

## 2021-02-12 RX ADMIN — MAGNESIUM SULFATE IN WATER 2 G: 40 INJECTION, SOLUTION INTRAVENOUS at 17:52

## 2021-02-12 RX ADMIN — DILTIAZEM HYDROCHLORIDE 10 MG: 5 INJECTION INTRAVENOUS at 21:11

## 2021-02-12 RX ADMIN — IOPAMIDOL 100 ML: 755 INJECTION, SOLUTION INTRAVENOUS at 18:43

## 2021-02-12 RX ADMIN — POTASSIUM CHLORIDE 10 MEQ: 7.46 INJECTION, SOLUTION INTRAVENOUS at 17:51

## 2021-02-12 RX ADMIN — DILTIAZEM HYDROCHLORIDE 10 MG: 5 INJECTION INTRAVENOUS at 17:08

## 2021-02-12 RX ADMIN — POTASSIUM CHLORIDE 20 MEQ: 20 SOLUTION ORAL at 20:23

## 2021-02-12 RX ADMIN — CEFTRIAXONE SODIUM 1 G: 1 INJECTION, SOLUTION INTRAVENOUS at 20:24

## 2021-02-12 ASSESSMENT — ENCOUNTER SYMPTOMS
DIFFICULTY URINATING: 0
CHILLS: 0
DIARRHEA: 0
CONSTIPATION: 0
FATIGUE: 0
ABDOMINAL PAIN: 0
EYES NEGATIVE: 1
COUGH: 1
FEVER: 0
GASTROINTESTINAL NEGATIVE: 1
DYSURIA: 0
PALPITATIONS: 1
SHORTNESS OF BREATH: 1
ACTIVITY CHANGE: 0
NAUSEA: 0
APPETITE CHANGE: 0
ABDOMINAL DISTENTION: 0
FREQUENCY: 0
VOMITING: 0
DIAPHORESIS: 0
MUSCULOSKELETAL NEGATIVE: 1
CHEST TIGHTNESS: 0

## 2021-02-12 NOTE — ED PROVIDER NOTES
History     Chief Complaint   Patient presents with     Irregular Heart Beat     Shortness of Breath     The history is provided by the patient.     Kerri Gonsales is a 75 year old female who presented to clinic today with some shortness of breath and dry cough that has been noticeable for about 2 days.  No fevers or chills, no abdominal pain or urinary symptoms.  She was noted to be in new onset atrial fibrillation with RVR and sent to the ER for further workup and treatment.  Last COVID test was 1/18/21 and was negative.  She states that she is on Propranolol and hydrochlorothiazide for hypertension and that she last took per Propranolol this afternoon.      Allergies:  Allergies   Allergen Reactions     Aspirin      Heart palpitations     Problem List:    Patient Active Problem List    Diagnosis Date Noted     Hypokalemia 02/12/2021     Priority: Medium     Hypomagnesemia 02/12/2021     Priority: Medium     Atrial fibrillation with rapid ventricular response (H) 02/12/2021     Priority: Medium        Past Medical History:    History reviewed. No pertinent past medical history.    Past Surgical History:    History reviewed. No pertinent surgical history.    Family History:    History reviewed. No pertinent family history.    Social History:  Marital Status:   [4]  Social History     Tobacco Use     Smoking status: Never Smoker     Smokeless tobacco: Never Used   Substance Use Topics     Alcohol use: Not Currently     Drug use: Never        Medications:         albuterol (PROVENTIL) (2.5 MG/3ML) 0.083% neb solution       Albuterol Sulfate (PROAIR HFA IN)       HYDROCHLOROTHIAZIDE PO       MECLIZINE HCL PO       Propranolol HCl (INDERAL PO)      Review of Systems   Constitutional: Negative for activity change, appetite change, chills, diaphoresis, fatigue and fever.   HENT: Negative.    Eyes: Negative.    Respiratory: Positive for cough and shortness of breath. Negative for chest tightness.     Cardiovascular: Positive for palpitations. Negative for chest pain.   Gastrointestinal: Negative.  Negative for abdominal distention, abdominal pain, constipation, diarrhea, nausea and vomiting.   Genitourinary: Negative for difficulty urinating, dysuria and frequency.   Musculoskeletal: Negative.    Skin: Negative.        Physical Exam   BP: 162/80  Pulse: 114  Temp: 97.2  F (36.2  C)  Resp: 18  Weight: 81.6 kg (180 lb)  SpO2: 97 %    Physical Exam  Constitutional:       General: She is not in acute distress.     Appearance: She is well-developed. She is not ill-appearing, toxic-appearing or diaphoretic.   HENT:      Head: Normocephalic and atraumatic.   Eyes:      Pupils: Pupils are equal, round, and reactive to light.   Cardiovascular:      Rate and Rhythm: Tachycardia present. Rhythm irregular.      Heart sounds: No murmur.   Pulmonary:      Effort: Pulmonary effort is normal. No tachypnea or respiratory distress.      Breath sounds: Normal breath sounds.   Chest:      Chest wall: No tenderness.   Abdominal:      Palpations: Abdomen is soft. There is no hepatomegaly, splenomegaly or mass.      Tenderness: There is no abdominal tenderness.   Musculoskeletal:      Right lower leg: No edema.      Left lower leg: No edema.   Skin:     General: Skin is warm.   Neurological:      General: No focal deficit present.      Mental Status: She is alert.       ED Course   Procedures         EKG Interpretation:      Interpreted by Prashanth Howell PA-C  Time reviewed: 1627  Symptoms at time of EKG: shortness of breath   Rhythm: atrial fibrillation - rapid  Rate: Tachycardia and 120-130  Axis: Normal  Ectopy: premature ventricular contractions (unifocal)  Conduction: normal  ST Segments/ T Waves: Non-specific ST-T wave changes  Q Waves: none  Comparison to prior: No old EKG available    Clinical Impression: atrial fibrillation (new onset)    Critical Care time:  none    Results for orders placed or performed during the  hospital encounter of 02/12/21 (from the past 24 hour(s))   Comprehensive metabolic panel   Result Value Ref Range    Sodium 136 133 - 144 mmol/L    Potassium 2.8 (LL) 3.4 - 5.3 mmol/L    Chloride 100 94 - 109 mmol/L    Carbon Dioxide 30 20 - 32 mmol/L    Anion Gap 6 3 - 14 mmol/L    Glucose 138 (H) 70 - 99 mg/dL    Urea Nitrogen 17 7 - 30 mg/dL    Creatinine 0.78 0.52 - 1.04 mg/dL    GFR Estimate 73 >60 mL/min/[1.73_m2]    GFR Estimate If Black 84 >60 mL/min/[1.73_m2]    Calcium 8.9 8.5 - 10.1 mg/dL    Bilirubin Total 0.7 0.2 - 1.3 mg/dL    Albumin 3.5 3.4 - 5.0 g/dL    Protein Total 7.5 6.8 - 8.8 g/dL    Alkaline Phosphatase 72 40 - 150 U/L    ALT 17 0 - 50 U/L    AST 15 0 - 45 U/L   Troponin I   Result Value Ref Range    Troponin I ES <0.015 0.000 - 0.045 ug/L   CBC with platelets differential   Result Value Ref Range    WBC 6.3 4.0 - 11.0 10e9/L    RBC Count 4.45 3.8 - 5.2 10e12/L    Hemoglobin 13.8 11.7 - 15.7 g/dL    Hematocrit 39.4 35.0 - 47.0 %    MCV 89 78 - 100 fl    MCH 31.0 26.5 - 33.0 pg    MCHC 35.0 31.5 - 36.5 g/dL    RDW 13.2 10.0 - 15.0 %    Platelet Count 249 150 - 450 10e9/L    Diff Method Automated Method     % Neutrophils 53.0 %    % Lymphocytes 31.9 %    % Monocytes 9.9 %    % Eosinophils 4.1 %    % Basophils 0.8 %    % Immature Granulocytes 0.3 %    Nucleated RBCs 0 0 /100    Absolute Neutrophil 3.3 1.6 - 8.3 10e9/L    Absolute Lymphocytes 2.0 0.8 - 5.3 10e9/L    Absolute Monocytes 0.6 0.0 - 1.3 10e9/L    Absolute Eosinophils 0.3 0.0 - 0.7 10e9/L    Absolute Basophils 0.1 0.0 - 0.2 10e9/L    Abs Immature Granulocytes 0.0 0 - 0.4 10e9/L    Absolute Nucleated RBC 0.0    Nt probnp inpatient (BNP)   Result Value Ref Range    N-Terminal Pro BNP Inpatient 6,001 (H) 0 - 1,800 pg/mL   Magnesium   Result Value Ref Range    Magnesium 1.5 (L) 1.6 - 2.3 mg/dL   Symptomatic Influenza A/B & SARS-CoV2 (COVID-19) Virus PCR Multiplex    Specimen: Nasopharyngeal   Result Value Ref Range    Flu A/B & SARS-COV-2  PCR Source Nasopharyngeal     SARS-CoV-2 PCR Result NEGATIVE     Influenza A PCR Negative NEG^Negative    Influenza B PCR Negative NEG^Negative    Respiratory Syncytial Virus PCR Negative NEG^Negative    Flu A/B & SARS-CoV-2 PCR Comment       Testing was performed using the Xpert Xpress SARS-CoV2/Flu/RSV Assay on the [employer]   GeneXpert Instrument. Additional information about the Emergency Use Authorization (EUA)   assay can be found via the Lab Guide.     XR Chest Port 1 View    Narrative    PROCEDURE:  XR CHEST PORT 1 VW    HISTORY:  shortness of breath.     COMPARISON:  None.    FINDINGS:   The cardiac silhouette is borderline in size.. The pulmonary  vasculature is normal.  The lungs are clear. There is deviation of the  trachea in the upper mediastinum raising the possibility of a  mediastinal mass. CT scan of the chest is recommended. No pleural  effusion or pneumothorax.      Impression    IMPRESSION:  Questionable upper mediastinal mass on the left. Further  evaluation with CT scanning is recommended      JADA SIMS MD   CTA Chest with Contrast    Narrative    PROCEDURE: CTA CHEST WITH CONTRAST 2/12/2021 7:03 PM    HISTORY: PE suspected, high prob mediastinal mass    COMPARISONS: None.    Meds/Dose Given: Isovue 370 75 ml    TECHNIQUE: CT angiogram of the chest with sagittal coronal MIPS  reconstructions    FINDINGS: CT angiogram reveals no pulmonary emboli. The thoracic aorta  is normal in caliber. There is mild cardiomegaly.    There is a large substernal goiter on the left deviating the trachea  to the right. The hilar and mediastinal lymph nodes are normal in  caliber. There is a moderate size hiatal hernia.    There are abnormal areas of increased density in the right upper and  right middle lobes suspicious for pneumonia. There is an 11 mm  diameter left lower lobe nodule.    The upper portion of the liver spleen and pancreas appear normal.  Degenerative changes are present in the thoracic  spine.         Impression    IMPRESSION: Right upper lobe and right middle lobe infiltrate  suspicious for pneumonia.    substernal goiter deviating the trachea from left to right.    11 mm in diameter nodule in the left lower lobe.    No pulmonary emboli.    JADA SIMS MD       Medications   cefTRIAXone in d5w (ROCEPHIN) intermittent infusion 1 g (has no administration in time range)   potassium chloride (KAYCIEL) solution 20 mEq (has no administration in time range)   diltiazem (CARDIZEM) injection 10 mg (10 mg Intravenous Given 2/12/21 1708)   potassium chloride 10 mEq in 100 mL sterile water intermittent infusion (premix) (10 mEq Intravenous New Bag 2/12/21 1751)   potassium chloride (KAYCIEL) solution 20 mEq (20 mEq Oral Given 2/12/21 1744)   magnesium sulfate 2 g in water intermittent infusion (2 g Intravenous New Bag 2/12/21 1752)   iopamidol (ISOVUE-370) solution 100 mL (100 mLs Intravenous Given 2/12/21 1843)   sodium chloride (PF) 0.9% PF flush 100 mL (100 mLs Intravenous Given 2/12/21 1844)     Assessments & Plan (with Medical Decision Making)     I have reviewed the nursing notes.    I have reviewed the findings, diagnosis, plan and need for follow up with the patient.       ED to Inpatient Handoff:    Discussed with Dr. Lau at 2000  Patient accepted for Inpatient Stay  Pending studies include procalcitonin, crp  Code Status: Not Addressed           New Prescriptions    No medications on file     Final diagnoses:   Atrial fibrillation with rapid ventricular response (H)   Hypokalemia   Hypomagnesemia   74 yo female presented to clinic with SOB and dry cough, found to be in atrial fibrillation with RVR.  EKG with afib/RVR with some ST depression.  Troponin normal, with BNP elevated.  Rate improved with 10 mg Diltiazem.  SBP stable.  Found to be significantly hypokalemic with some hypomagnesemia as well.  This was replaced in the ER. CXR showing questionable mediastinal mass on the left.  A chest  CT was then ordered to rule out mass/PE.  Checked on patient again and her symptoms were improved and her HR was in the 90s.  SBP continues to be stable.  CT scan showed right upper and middle lobe infiltrates and a goiter compressing the trachea.      2/12/2021   [facility]     Prashanth Howell PA-C  02/12/21 2011

## 2021-02-12 NOTE — ED TRIAGE NOTES
Patient was seen at [facility] and sent over with new onset of A-fib per nurse and some t-wave abnormalities. She states she's felt more SOB for a few days and coughing up clear phlegm the last day or so.

## 2021-02-12 NOTE — ED NOTES
DATE:  2/12/2021   TIME OF RECEIPT FROM LAB:  7800  LAB TEST:  Potassium  LAB VALUE:  2.8  RESULTS GIVEN WITH READ-BACK TO (PROVIDER): Prashanth GALICIA  TIME LAB VALUE REPORTED TO PROVIDER:   9213

## 2021-02-13 LAB
ALBUMIN UR-MCNC: NEGATIVE MG/DL
ANION GAP SERPL CALCULATED.3IONS-SCNC: 5 MMOL/L (ref 3–14)
APPEARANCE UR: CLEAR
BACTERIA #/AREA URNS HPF: ABNORMAL /HPF
BILIRUB UR QL STRIP: NEGATIVE
BUN SERPL-MCNC: 13 MG/DL (ref 7–30)
CALCIUM SERPL-MCNC: 8.9 MG/DL (ref 8.5–10.1)
CHLORIDE SERPL-SCNC: 99 MMOL/L (ref 94–109)
CHOLEST SERPL-MCNC: 181 MG/DL
CO2 SERPL-SCNC: 32 MMOL/L (ref 20–32)
COLOR UR AUTO: ABNORMAL
CREAT SERPL-MCNC: 0.75 MG/DL (ref 0.52–1.04)
CRP SERPL-MCNC: 17.3 MG/L (ref 0–8)
GFR SERPL CREATININE-BSD FRML MDRD: 76 ML/MIN/{1.73_M2}
GLUCOSE SERPL-MCNC: 135 MG/DL (ref 70–99)
GLUCOSE UR STRIP-MCNC: NEGATIVE MG/DL
HDLC SERPL-MCNC: 64 MG/DL
HGB UR QL STRIP: NEGATIVE
HYALINE CASTS #/AREA URNS LPF: 2 /LPF
KETONES UR STRIP-MCNC: NEGATIVE MG/DL
LDLC SERPL CALC-MCNC: 99 MG/DL
LEUKOCYTE ESTERASE UR QL STRIP: ABNORMAL
MUCOUS THREADS #/AREA URNS LPF: PRESENT /LPF
NITRATE UR QL: NEGATIVE
NONHDLC SERPL-MCNC: 117 MG/DL
PH UR STRIP: 6.5 PH (ref 4.7–8)
POTASSIUM SERPL-SCNC: 2.9 MMOL/L (ref 3.4–5.3)
POTASSIUM SERPL-SCNC: 3.4 MMOL/L (ref 3.4–5.3)
POTASSIUM SERPL-SCNC: 3.6 MMOL/L (ref 3.4–5.3)
PROCALCITONIN SERPL-MCNC: <0.05 NG/ML
RBC #/AREA URNS AUTO: 1 /HPF (ref 0–2)
SODIUM SERPL-SCNC: 136 MMOL/L (ref 133–144)
SOURCE: ABNORMAL
SP GR UR STRIP: 1.02 (ref 1–1.03)
SQUAMOUS #/AREA URNS AUTO: 0 /HPF (ref 0–1)
TRIGL SERPL-MCNC: 90 MG/DL
TSH SERPL DL<=0.005 MIU/L-ACNC: 1.61 MU/L (ref 0.4–4)
UROBILINOGEN UR STRIP-MCNC: NORMAL MG/DL (ref 0–2)
WBC #/AREA URNS AUTO: <1 /HPF (ref 0–5)

## 2021-02-13 PROCEDURE — 86140 C-REACTIVE PROTEIN: CPT | Performed by: INTERNAL MEDICINE

## 2021-02-13 PROCEDURE — 94640 AIRWAY INHALATION TREATMENT: CPT

## 2021-02-13 PROCEDURE — 84443 ASSAY THYROID STIM HORMONE: CPT | Performed by: INTERNAL MEDICINE

## 2021-02-13 PROCEDURE — 84145 PROCALCITONIN (PCT): CPT | Performed by: INTERNAL MEDICINE

## 2021-02-13 PROCEDURE — 99232 SBSQ HOSP IP/OBS MODERATE 35: CPT | Performed by: INTERNAL MEDICINE

## 2021-02-13 PROCEDURE — 250N000013 HC RX MED GY IP 250 OP 250 PS 637: Performed by: INTERNAL MEDICINE

## 2021-02-13 PROCEDURE — 250N000009 HC RX 250: Performed by: INTERNAL MEDICINE

## 2021-02-13 PROCEDURE — 80061 LIPID PANEL: CPT | Performed by: INTERNAL MEDICINE

## 2021-02-13 PROCEDURE — 36415 COLL VENOUS BLD VENIPUNCTURE: CPT | Performed by: INTERNAL MEDICINE

## 2021-02-13 PROCEDURE — 84132 ASSAY OF SERUM POTASSIUM: CPT | Performed by: INTERNAL MEDICINE

## 2021-02-13 PROCEDURE — 250N000011 HC RX IP 250 OP 636: Performed by: INTERNAL MEDICINE

## 2021-02-13 PROCEDURE — 80048 BASIC METABOLIC PNL TOTAL CA: CPT | Performed by: INTERNAL MEDICINE

## 2021-02-13 PROCEDURE — 81001 URINALYSIS AUTO W/SCOPE: CPT | Performed by: PHYSICIAN ASSISTANT

## 2021-02-13 PROCEDURE — 999N000157 HC STATISTIC RCP TIME EA 10 MIN

## 2021-02-13 PROCEDURE — 120N000001 HC R&B MED SURG/OB

## 2021-02-13 RX ORDER — POTASSIUM CHLORIDE 1500 MG/1
20 TABLET, EXTENDED RELEASE ORAL ONCE
Status: COMPLETED | OUTPATIENT
Start: 2021-02-13 | End: 2021-02-13

## 2021-02-13 RX ORDER — IPRATROPIUM BROMIDE AND ALBUTEROL SULFATE 2.5; .5 MG/3ML; MG/3ML
3 SOLUTION RESPIRATORY (INHALATION) EVERY 4 HOURS PRN
Status: DISCONTINUED | OUTPATIENT
Start: 2021-02-13 | End: 2021-02-14 | Stop reason: HOSPADM

## 2021-02-13 RX ORDER — DILTIAZEM HYDROCHLORIDE 30 MG/1
60 TABLET, FILM COATED ORAL EVERY 6 HOURS SCHEDULED
Status: DISCONTINUED | OUTPATIENT
Start: 2021-02-13 | End: 2021-02-14 | Stop reason: HOSPADM

## 2021-02-13 RX ORDER — METOPROLOL TARTRATE 25 MG/1
25 TABLET, FILM COATED ORAL ONCE
Status: COMPLETED | OUTPATIENT
Start: 2021-02-13 | End: 2021-02-13

## 2021-02-13 RX ORDER — POTASSIUM CHLORIDE 1500 MG/1
40 TABLET, EXTENDED RELEASE ORAL ONCE
Status: COMPLETED | OUTPATIENT
Start: 2021-02-13 | End: 2021-02-13

## 2021-02-13 RX ORDER — DILTIAZEM HYDROCHLORIDE 30 MG/1
30 TABLET, FILM COATED ORAL EVERY 6 HOURS SCHEDULED
Status: DISCONTINUED | OUTPATIENT
Start: 2021-02-13 | End: 2021-02-13

## 2021-02-13 RX ORDER — METOPROLOL TARTRATE 50 MG
50 TABLET ORAL 2 TIMES DAILY
Status: DISCONTINUED | OUTPATIENT
Start: 2021-02-13 | End: 2021-02-14 | Stop reason: HOSPADM

## 2021-02-13 RX ADMIN — METOPROLOL TARTRATE 25 MG: 25 TABLET, FILM COATED ORAL at 08:59

## 2021-02-13 RX ADMIN — FUROSEMIDE 20 MG: 10 INJECTION, SOLUTION INTRAMUSCULAR; INTRAVENOUS at 00:31

## 2021-02-13 RX ADMIN — POTASSIUM CHLORIDE 40 MEQ: 1500 TABLET, EXTENDED RELEASE ORAL at 06:05

## 2021-02-13 RX ADMIN — APIXABAN 5 MG: 2.5 TABLET, FILM COATED ORAL at 01:35

## 2021-02-13 RX ADMIN — DILTIAZEM HYDROCHLORIDE 60 MG: 30 TABLET, FILM COATED ORAL at 17:58

## 2021-02-13 RX ADMIN — APIXABAN 5 MG: 2.5 TABLET, FILM COATED ORAL at 20:40

## 2021-02-13 RX ADMIN — IPRATROPIUM BROMIDE AND ALBUTEROL SULFATE 3 ML: .5; 3 SOLUTION RESPIRATORY (INHALATION) at 15:02

## 2021-02-13 RX ADMIN — METOPROLOL TARTRATE 25 MG: 25 TABLET, FILM COATED ORAL at 08:17

## 2021-02-13 RX ADMIN — DILTIAZEM HYDROCHLORIDE 30 MG: 30 TABLET, FILM COATED ORAL at 07:38

## 2021-02-13 RX ADMIN — METOPROLOL TARTRATE 25 MG: 25 TABLET, FILM COATED ORAL at 01:34

## 2021-02-13 RX ADMIN — METOPROLOL TARTRATE 5 MG: 5 INJECTION INTRAVENOUS at 00:35

## 2021-02-13 RX ADMIN — POTASSIUM CHLORIDE 20 MEQ: 1500 TABLET, EXTENDED RELEASE ORAL at 07:39

## 2021-02-13 RX ADMIN — DILTIAZEM HYDROCHLORIDE 30 MG: 30 TABLET, FILM COATED ORAL at 12:59

## 2021-02-13 RX ADMIN — METOPROLOL TARTRATE 50 MG: 50 TABLET, FILM COATED ORAL at 20:40

## 2021-02-13 RX ADMIN — APIXABAN 5 MG: 2.5 TABLET, FILM COATED ORAL at 08:17

## 2021-02-13 ASSESSMENT — ACTIVITIES OF DAILY LIVING (ADL)
ADLS_ACUITY_SCORE: 15

## 2021-02-13 ASSESSMENT — MIFFLIN-ST. JEOR: SCORE: 1309.63

## 2021-02-13 NOTE — PROGRESS NOTES
[facility]    Hospitalist Progress Note    Date of Service (when I saw the patient): 02/13/2021    Assessment & Plan   Kerri Gonsales is a 75 year old female who presents with dyspnea over a few days which is due to new onset afib with RVR and diastolic heart failure     Principal Problem:    New onset a-fib (H)    Assessment: new onset, possible rate dependent heart failure. Duration unknown, so acute rhythm control contraindicated.  Given cardizem in ED x 2 10 mg. Rate improved. Will admit to medical floor with tele.    Plan: switch her over to metoprolol 20 bid from inderal, her rates are still 120-140s.  Increased to 50 mg bid, started oral diltiazem.  Titrate up as indicated, goal HR < 100.      continuing xarelto started on admission     Active Problems:    Hypokalemia    Assessment: present on admission, possibly due to HCTZ    Plan: Hold hydrochlorothiazide and replace.       Hypomagnesemia    Assessment: 1.5 on admission.     Plan: replaced per protocol       Acute diastolic heart failure (H)    Assessment: got 20 lasix x 1    Plan: Consider starting diuretics when electrolytes replaced and rate better controlled. ECHO ordered Fri evening, might have done outpatient if she is discharged before Monday.       Simple chronic bronchitis (H)    Assessment: present on admission.    Plan: will use nebulizers PRN.               Need PFTs outpatient.       Benign essential hypertension    Assessment: present on admission. Was treated with propranol 80 mg po tid and hydrochlorothiazide.    Plan: consider ACE (better fits her comorbidity profile)     Abnormal CT chest  Assessment: read as possible pneumonia  Plan: will not continue Rocephin               Nenbs and incentive spirometry      Substernal goiter  Assessment: present on admission.   Plan: TSH is 1.61.        DVT Prophylaxis: xarelto 5 mg bid started for afib  Code Status: Full Code     Disposition: Expected discharge in 1-2 days once rate  controlled, BP stable, electrolytes replaced.      Jenaro Logan MD    Interval History   Patient seen at bedside.  She states she feels better than admission.  Still sob with exertion.  Tele with -140s, afib RVR.  No LE edema.  Tolerating medications at this time.  ROS negative, no cp.    -Data reviewed today: I reviewed all new labs and imaging results over the last 24 hours. I personally reviewed imaging reports.    Physical Exam   Temp: 99.1  F (37.3  C) Temp src: Tympanic BP: 131/75 Pulse: (!) 141   Resp: 22 SpO2: 94 % O2 Device: None (Room air)    Vitals:    02/12/21 1624 02/13/21 0500   Weight: 81.6 kg (180 lb) 79.2 kg (174 lb 9.7 oz)     Vital Signs with Ranges  Temp:  [97.2  F (36.2  C)-99.1  F (37.3  C)] 99.1  F (37.3  C)  Pulse:  [] 141  Resp:  [12-25] 22  BP: (110-162)/() 131/75  SpO2:  [94 %-97 %] 94 %    Intake/Output Summary (Last 24 hours) at 2/13/2021 0842  Last data filed at 2/13/2021 0739  Gross per 24 hour   Intake 595 ml   Output 925 ml   Net -330 ml       Peripheral IV 02/12/21 Left Upper forearm (Active)   Site Assessment WDL 02/13/21 0740   Line Status Saline locked 02/13/21 0740   Phlebitis Scale 0-->no symptoms 02/13/21 0740   Infiltration Scale 0 02/13/21 0010   Number of days: 1     Line/device assessment(s) completed for medical necessity    Constitutional - AA, NAD, pleasant  HEENT - atraumatic, normocephalic  Neck - supple, no masses, no JVD  CVS - S1 S2 irregular, tachy, no murmurs, rubs, gallops  Respiratory - CTA b/l  GI - soft, NT, ND, + bowel sounds, no organomegaly  Musculoskeletal - no LE edema, no lesions  Neuro - oriented x 3, no gross focal deficits    Medications     Reason anticoagulant not prescribed for atrial fibrillation       - MEDICATION INSTRUCTIONS -         apixaban ANTICOAGULANT  5 mg Oral BID     diltiazem  30 mg Oral Q6H SAMARIA     ipratropium - albuterol 0.5 mg/2.5 mg/3 mL  3 mL Nebulization Q4H While awake     metoprolol tartrate  25 mg Oral  Once     metoprolol tartrate  50 mg Oral BID       Data   Recent Labs   Lab 02/13/21  0522 02/12/21  2308 02/12/21  1702   WBC  --   --  6.3   HGB  --   --  13.8   MCV  --   --  89   PLT  --   --  249     --  136   POTASSIUM 2.9* 3.5 2.8*   CHLORIDE 99  --  100   CO2 32  --  30   BUN 13  --  17   CR 0.75  --  0.78   ANIONGAP 5  --  6   KORI 8.9  --  8.9   *  --  138*   ALBUMIN  --   --  3.5   PROTTOTAL  --   --  7.5   BILITOTAL  --   --  0.7   ALKPHOS  --   --  72   ALT  --   --  17   AST  --   --  15   TROPI  --  <0.015 <0.015          Recent Results (from the past 24 hour(s))   XR Chest Port 1 View    Narrative    PROCEDURE:  XR CHEST PORT 1 VW    HISTORY:  shortness of breath.     COMPARISON:  None.    FINDINGS:   The cardiac silhouette is borderline in size.. The pulmonary  vasculature is normal.  The lungs are clear. There is deviation of the  trachea in the upper mediastinum raising the possibility of a  mediastinal mass. CT scan of the chest is recommended. No pleural  effusion or pneumothorax.      Impression    IMPRESSION:  Questionable upper mediastinal mass on the left. Further  evaluation with CT scanning is recommended      JADA SIMS MD   CTA Chest with Contrast    Narrative    PROCEDURE: CTA CHEST WITH CONTRAST 2/12/2021 7:03 PM    HISTORY: PE suspected, high prob mediastinal mass    COMPARISONS: None.    Meds/Dose Given: Isovue 370 75 ml    TECHNIQUE: CT angiogram of the chest with sagittal coronal MIPS  reconstructions    FINDINGS: CT angiogram reveals no pulmonary emboli. The thoracic aorta  is normal in caliber. There is mild cardiomegaly.    There is a large substernal goiter on the left deviating the trachea  to the right. The hilar and mediastinal lymph nodes are normal in  caliber. There is a moderate size hiatal hernia.    There are abnormal areas of increased density in the right upper and  right middle lobes suspicious for pneumonia. There is an 11 mm  diameter left lower  lobe nodule.    The upper portion of the liver spleen and pancreas appear normal.  Degenerative changes are present in the thoracic spine.         Impression    IMPRESSION: Right upper lobe and right middle lobe infiltrate  suspicious for pneumonia.    substernal goiter deviating the trachea from left to right.    11 mm in diameter nodule in the left lower lobe.    No pulmonary emboli.    MD Jenaro VARGAS MD

## 2021-02-13 NOTE — PLAN OF CARE
"Reason for hospital stay:  New onset AFIB    Most recent vitals: /55   Pulse 102   Temp 99.4  F (37.4  C) (Tympanic)   Resp 20   Ht 1.702 m (5' 7\")   Wt 79.2 kg (174 lb 9.7 oz)   SpO2 96%   BMI 27.35 kg/m        LOC:  A&O x 4. Pleasant, able to make needs known.   Cardiac:  HR has been fluctuating much of shift. Initally this morning,  Patient's B/P was 131/75. HR in AFIB and rate was in the 110s-150s. Patient asymptomatic. Denies chest pain, or \"fluttering\" feeling like she had prior to admission. Did receive dose of Cardizem 30 mg PO at 0738. Also received metoprolol 50 mg PO this morning. HR did get into the 70s this morning. Per ICU RN, patient in AFIB  with occasional PVC's.     About 1300, HR in the 100s-120s again. B/P was 120/44 and 123/55. MD was notified. Okay to give Cardizem.   1430: HR 90s-110s.     Respiratory:  LS clear. Denies SOB  GI:  BS active x 4. Rounded, soft. Had one loose stool this shift per patient.   :  WDL  Skin Issues:  Scattered scabs.     IVF:  SL  Nutrition: Regular Diet, Low Fat  ADL's:  Independent    Ambulation:Independent, call light in reach. Steady gait. No dizziness upon standing.   Safety:  Call light in reach, bed in low position.       Face to face report given with opportunity to observe patient.    Report given to MICHAEL Clark and MICHAEL Caputo.     Malika Ocampo RN   2/13/2021  3:30 PM        "

## 2021-02-13 NOTE — PLAN OF CARE
Face to face report given with opportunity to observe patient.    Report given to Danika Obrien RN   2/12/2021  11:49 PM

## 2021-02-13 NOTE — PLAN OF CARE
[facility] Inpatient Admission Note:    Patient admitted to 3228/3228-1 at approximately 10:32 PM via cart accompanied by transport tech from emergency room . Report received from Nilton RN / Selene RN in SBAR format at 10:19 PM via telephone. Patient ambulated to bed via self.. Patient is alert and oriented X 3, denies pain; rates at 0 on 0-10 scale.  Patient oriented to room, unit, hourly rounding, and plan of care. Explained admission packet and patient handbook with patient bill of rights brochure. Will continue to monitor and document as needed.     Inpatient Nursing criteria listed below was met:    Health care directives status obtained and documented: Yes    Care Everywhere authorization obtained No    MRSA swab completed for patient 65 years and older: N/A    Patient identifies a surrogate decision maker: Yes If yes, who: Grand daughter Marcin Alcala -  Contact Information:[phone]     If initial lactic acid >2.0, repeat lactic acid drawn within one hour of arrival to unit: NA.    Vaccination assessment and education completed: Yes   Vaccinations received prior to admission: Pneumovax yes  Influenza(seasonal)  YES   Vaccination(s) ordered: patient already up to date    Clergy visit ordered if patient requests: No - patient declined    Skin issues/needs documented: No    Isolation Patient: no Education given, correct sign in place and documentation row added to PCS:  No    Fall Prevention Yes: Care plan updated, education given and documented, sticker and magnet in place: Yes    Care Plan initiated: Yes    Education Documented (including assessment): Yes    Patient has discharge needs : No

## 2021-02-13 NOTE — H&P
[facility]    History and Physical  Hospitalist       Date of Admission:  2/12/2021    Assessment & Plan   Kerri Gonsales is a 75 year old female who presents with dyspnea over a few days which is due to new onset afib with RVR and diastolic heart failure    Principal Problem:    New onset a-fib (H)    Assessment: new onset and is a cause of hear symptoms and heart failure. Given cardizem in ED x 2 10 mg. Rate improved. Will admit to medical floor with tele.    Plan: Because she is on high dose of propranolol (80 mg tid) I will continue her on BB and start metoprolol 25 mg bid (to prevent  Sudden BB withdrawal symptoms). CCB would be better option because I believe she has obstructive airway (she is wheezing on admission and has h/o bronchitis and uses nebs at home)   Start xarelto tonight    Active Problems:    Hypokalemia    Assessment: present on admission, due to HCTZ    Plan: Hold hydrochlorothiazide and repalce.      Hypomagnesemia    Assessment: 1.5 on admission.     Plan: replace per protocol      Acute diastolic heart failure (H)    Assessment: will give 20 mg lasix today.     Plan: Consider starting diuretics when electrolytes replaced and rate better controlled.       Simple chronic bronchitis (H)    Assessment: present on admission.    Plan: will use nebulizers PRN.    Need PfT outpatient.      Benign essential hypertension    Assessment: present on admission. Was treated with propranol 80 mg po tid and hydrochlorothiazide.     Plan: consider aCE (better fits her comorbidity profile)    Abnormal CT chest  Assessment: read as possible pneumonia  Plan: will not continue Rocephin    Nenbs and incentive spirometry    Substernal goiter  Assessment: present on admission.   Plan: Check TSH and T4. Consider US.      DVT Prophylaxis: xarelto 5 mg bid started for afib  Code Status: Full Code    Disposition: Expected discharge in 2-3 days once rate controlled, BP stable, electrolytes replaced.  .    Felton Lau    Primary Care Physician   MARYANNE CABALLERO    Chief Complaint shortness of breath    Reason for Admission: new onset afib    Admission status: med / surg telemetry, inpatient.     History is obtained from the patient, electronic health record and emergency department physician    History of Present Illness   Kerri Gonsales is a 75 year old female with PMH of HTN, simple bronchitis (uses nebs PRN, recently more frequently), who came to ED from PCP for new onset afib. She was c/o worsening of dyspnea for a few days. Nebulizers did not help. Dyspnea worsens when she lays flat. No h/o CHF, no fever, some productive cough. She stated when she gets symptoms like this, her family doctor gives her antibiotics for bronchitis and she fells relief in a few days.   She comes to eD with -140. 10 mg diltiazem given on admission and a the end of ED stay (prior to transfer to medical floor).   12 elements of ROS obtained. Besides dyspnea which worsens laying flat and some cough, the rest is negative.    ED events and diagnostic workup: Na 136 K 2.8 Mg 1.5 BnP 6,001, troponin <0.015.   Wbc count and hemoglobin within normal limits.   XR chest: Questionable upper mediastinal mass on the left. Further evaluation with CT scanning is recommended    CTA chest; Right upper lobe and right middle lobe infiltrate suspicious for pneumonia.  substernal goiter deviating the trachea from left to right.  11 mm in diameter nodule in the left lower lobe. No pulmonary emboli.    ED treatment: cardizem 10 mg iv x 2, rocephin 1 gr IV x 1    Past Medical History    I have reviewed this patient's medical history and updated it with pertinent information if needed.   Hypertension    Past Surgical History   I have reviewed this patient's surgical history and updated it with pertinent information if needed.  History reviewed. No pertinent surgical history.    Prior to Admission Medications   Prior to Admission Medications    Prescriptions Last Dose Informant Patient Reported? Taking?   Albuterol Sulfate (PROAIR HFA IN) 2/11/2021 at Unknown time  Yes Yes   Sig: Inhale 2 puffs into the lungs every 4 hours as needed   HYDROCHLOROTHIAZIDE PO 2/12/2021 at Unknown time  Yes Yes   Sig: Take 25 mg by mouth daily   MECLIZINE HCL PO Past Week at Unknown time  Yes Yes   Sig: Take 25 mg by mouth as needed for dizziness   Propranolol HCl (INDERAL PO) 2/12/2021 at Unknown time  Yes Yes   Sig: Take 80 mg by mouth 3 times daily   albuterol (PROVENTIL) (2.5 MG/3ML) 0.083% neb solution 2/12/2021 at Unknown time Self Yes Yes   Sig: Take 2.5 mg by nebulization every 6 hours as needed for shortness of breath / dyspnea or wheezing      Facility-Administered Medications: None     Allergies   Allergies   Allergen Reactions     Aspirin      Heart palpitations       Social History   I have reviewed this patient's social history and updated it with pertinent information if needed. Kerri Gonsales  reports that she has never smoked. She has never used smokeless tobacco. She reports previous alcohol use. She reports that she does not use drugs.    Family History   I have reviewed this patient's family history and updated it with pertinent information if needed.   Father had heart problems.     Review of Systems   All 14 Systems were reviewed and found to be negative except what's mentioned in HPI.    Physical Exam   Temp: 97.2  F (36.2  C) Temp src: Tympanic BP: 124/65 Pulse: 79   Resp: 23 SpO2: 94 % O2 Device: None (Room air)    Vital Signs with Ranges  Temp:  [97.2  F (36.2  C)] 97.2  F (36.2  C)  Pulse:  [] 79  Resp:  [12-25] 23  BP: (110-162)/() 124/65  SpO2:  [94 %-97 %] 94 %  180 lbs 0 oz    Physical exam:   Constitutional: well developed, well nourished, not in distress.   HEENT: atraumatic, normocephalic. MMM. No icterus.  NECK: supple, no masses, no bruits, no LAD.  CVS: irregular, S1, S2, no S3, no murmurs, rubs or gallops. No  edema.  RESPIRATORY: symmetrical respirations, no accessory muscle use, expiratory scattered wheezes bilaterally, more prominent when expiration is forceful.   GI: soft, not tender, not distended, no HSM, no pulsatile masses. BS present.   : no CVA tenderness. Bladder not palpable.  MS: normal muscle bulk, no fasciculations, no sarcopenia.  NEUROLOGICAL: non-focal esam.   PSYCHIATRIC: awake, oriented x 3, calm, cooperative.  SKIN: warm, well perfused. No rash.   HEMATOLOGIC/LYMPHATIC: no petechia, no ecchymoses, no lymphadenopathy.     Goals of care were discussed with the patient which included but not limited to anticipated treatment course during the current hospitalization, recovery from current event, discharge planning and transitions of care responsibilities and expected outcomes. Code status was addressed on admission as well. End of life care discussion not done.    Data   Data reviewed today:  I personally reviewed: blood work, chest XR, EKG and urinary test results.     See below radiology report.   Recent Labs   Lab 02/12/21  1702   WBC 6.3   HGB 13.8   MCV 89         POTASSIUM 2.8*   CHLORIDE 100   CO2 30   BUN 17   CR 0.78   ANIONGAP 6   KORI 8.9   *   ALBUMIN 3.5   PROTTOTAL 7.5   BILITOTAL 0.7   ALKPHOS 72   ALT 17   AST 15   TROPI <0.015       Recent Results (from the past 24 hour(s))   XR Chest Port 1 View    Narrative    PROCEDURE:  XR CHEST PORT 1 VW    HISTORY:  shortness of breath.     COMPARISON:  None.    FINDINGS:   The cardiac silhouette is borderline in size.. The pulmonary  vasculature is normal.  The lungs are clear. There is deviation of the  trachea in the upper mediastinum raising the possibility of a  mediastinal mass. CT scan of the chest is recommended. No pleural  effusion or pneumothorax.      Impression    IMPRESSION:  Questionable upper mediastinal mass on the left. Further  evaluation with CT scanning is recommended      JADA SIMS MD   CTA Chest  with Contrast    Narrative    PROCEDURE: CTA CHEST WITH CONTRAST 2/12/2021 7:03 PM    HISTORY: PE suspected, high prob mediastinal mass    COMPARISONS: None.    Meds/Dose Given: Isovue 370 75 ml    TECHNIQUE: CT angiogram of the chest with sagittal coronal MIPS  reconstructions    FINDINGS: CT angiogram reveals no pulmonary emboli. The thoracic aorta  is normal in caliber. There is mild cardiomegaly.    There is a large substernal goiter on the left deviating the trachea  to the right. The hilar and mediastinal lymph nodes are normal in  caliber. There is a moderate size hiatal hernia.    There are abnormal areas of increased density in the right upper and  right middle lobes suspicious for pneumonia. There is an 11 mm  diameter left lower lobe nodule.    The upper portion of the liver spleen and pancreas appear normal.  Degenerative changes are present in the thoracic spine.         Impression    IMPRESSION: Right upper lobe and right middle lobe infiltrate  suspicious for pneumonia.    substernal goiter deviating the trachea from left to right.    11 mm in diameter nodule in the left lower lobe.    No pulmonary emboli.    JADA SIMS MD

## 2021-02-13 NOTE — PLAN OF CARE
Received notification from ICU RN, patient HR consistently in 140s-150s.   Patient denies any symptoms.   Notified MD, MD was just at bedside with patient.

## 2021-02-13 NOTE — UTILIZATION REVIEW
"  Admission Status; Secondary Review Determination     Admission Date: 2/12/2021  4:32 PM      Under the authority of the Utilization Management Committee, the utilization review process indicated a secondary review on the above patient.  The review outcome is based on review of the medical records, discussions with staff, and applying clinical experience noted on the date of the review.        (x)      Inpatient Status Appropriate - This patient's medical care is consistent with medical management for inpatient care and reasonable inpatient medical practice.      () Observation Status Appropriate - This patient does not meet hospital inpatient criteria and is placed in observation status. If this patient's primary payer is Medicare and was admitted as an inpatient, Condition Code 44 should be used and patient status changed to \"observation\".   () Admission Status NOT Appropriate - This patient's medical care is not consistent with medical management for Inpatient or Observation Status.          RATIONALE FOR DETERMINATION   Kerri Gonsales is a 75 year old female with PMH of HTN, and bronchitis.  She was directed to the emergency room after being found to have new onset atrial fibrillation at primary care provider's office.  She also was noted to have acute CHF exacerbation.  She initially required IV diltiazem for heart rate control.  Initially required IV furosemide for diuresis.  She continues to have episodes of rapid ventricular response to her new atrial fibrillation.  Medications continue to be adjusted to control heart rate.  She has been switched to oral metoprolol with need for increased dose today.  Also being started on oral diltiazem.  She does need continued close monitoring of her cardiac status while medication adjustments are being made in the acute setting.  She is expected to require a prolonged hospital stay.  Due to this patient's advanced age, new onset atrial fibrillation with continued " episodes of rapid ventricular response requiring continued medication adjustments, acute CHF initially requiring IV furosemide, and expectation of a prolonged hospital stay, it is appropriate to have her admitted to the hospital as an inpatient.      The severity of illness, intensity of service provided, expected LOS and risk for adverse outcome make the care complex, high risk and appropriate for hospital admission.        The information on this document is developed by the utilization review team in order for the business office to ensure compliance.  This only denotes the appropriateness of proper admission status and does not reflect the quality of care rendered.         The definitions of Inpatient Status and Observation Status used in making the determination above are those provided in the CMS Coverage Manual, Chapter 1 and Chapter 6, section 70.4.      Sincerely,     Bolivar Osman D.O.  Utilization Review/ Case Management  [facility].

## 2021-02-13 NOTE — PROVIDER NOTIFICATION
DATE:  2/13/2021   TIME OF RECEIPT FROM LAB:  0546  LAB TEST:  Potassium  LAB VALUE:  2.9  RESULTS GIVEN WITH READ-BACK TO (PROVIDER):  Felton Lau MD  TIME LAB VALUE REPORTED TO PROVIDER:   0549

## 2021-02-13 NOTE — PLAN OF CARE
Most recent vitals: /71   Pulse 107   Temp 98.5  F (36.9  C) (Tympanic)   Resp 18   Wt 81.6 kg (180 lb)   SpO2 97%   BMI 28.19 kg/m      A&O, makes needs known, uses call light appropriately, independent in room. Denies pain. Afebrile. Lungs clear, dim in LLL, dry, unproductive, infrequent cough. HR irregular, denies SOB, palpatations, chest pain, dizziness, and lightheadedness. HR , BP 120s/50s to 140s/60s, tele: AFIB HR  with occasional PVCs. Up to bathroom with no reports of SOB. Patient had 925ml of urine output this shift. Patient was awake most of this shift. Regular low fat diet. Call light in reach.     Face to face report given with opportunity to observe patient.    Report given to MICHAEL Daly RN   2/13/2021  6:56 AM

## 2021-02-14 VITALS
TEMPERATURE: 99.8 F | BODY MASS INDEX: 27.85 KG/M2 | SYSTOLIC BLOOD PRESSURE: 130 MMHG | DIASTOLIC BLOOD PRESSURE: 59 MMHG | HEIGHT: 67 IN | HEART RATE: 86 BPM | WEIGHT: 177.47 LBS | OXYGEN SATURATION: 96 % | RESPIRATION RATE: 18 BRPM

## 2021-02-14 PROCEDURE — 250N000013 HC RX MED GY IP 250 OP 250 PS 637: Performed by: INTERNAL MEDICINE

## 2021-02-14 PROCEDURE — 99239 HOSP IP/OBS DSCHRG MGMT >30: CPT | Performed by: INTERNAL MEDICINE

## 2021-02-14 RX ORDER — METOPROLOL TARTRATE 50 MG
50 TABLET ORAL 2 TIMES DAILY
Qty: 60 TABLET | Refills: 3 | Status: SHIPPED | OUTPATIENT
Start: 2021-02-14 | End: 2021-03-31

## 2021-02-14 RX ORDER — DILTIAZEM HYDROCHLORIDE 240 MG/1
240 CAPSULE, EXTENDED RELEASE ORAL DAILY
Qty: 30 CAPSULE | Refills: 1 | Status: ON HOLD | OUTPATIENT
Start: 2021-02-14 | End: 2021-02-26

## 2021-02-14 RX ADMIN — METOPROLOL TARTRATE 50 MG: 50 TABLET, FILM COATED ORAL at 09:43

## 2021-02-14 RX ADMIN — DILTIAZEM HYDROCHLORIDE 60 MG: 30 TABLET, FILM COATED ORAL at 00:14

## 2021-02-14 RX ADMIN — DILTIAZEM HYDROCHLORIDE 60 MG: 30 TABLET, FILM COATED ORAL at 06:25

## 2021-02-14 RX ADMIN — DILTIAZEM HYDROCHLORIDE 60 MG: 30 TABLET, FILM COATED ORAL at 11:54

## 2021-02-14 RX ADMIN — APIXABAN 5 MG: 2.5 TABLET, FILM COATED ORAL at 09:44

## 2021-02-14 ASSESSMENT — ACTIVITIES OF DAILY LIVING (ADL)
ADLS_ACUITY_SCORE: 15

## 2021-02-14 ASSESSMENT — MIFFLIN-ST. JEOR: SCORE: 1322.63

## 2021-02-14 NOTE — PLAN OF CARE
Patient discharged at 3:08 PM via wheel chair accompanied by staff. Prescriptions sent to patients preferred pharmacy. All belongings sent with patient.     Discharge instructions reviewed with patient. Listed belongings gathered and returned to patient. yes    Patient discharged to home.   Report called to N/A    Core Measures and Vaccines  Core Measures applicable during stay: No. If yes, state diagnosis: N/A  Pneumonia and Influenza given prior to discharge, if indicated: N/A    Surgical Patient   Surgical Procedures during stay: N/A  Did patient receive discharge instruction on wound care and recognition of infection symptoms? N/A    MISC  Follow up appointment made:  No  Home and hospital aquired medications returned to patient: N/A  Patient reports pain was well managed at discharge: Yes

## 2021-02-14 NOTE — PLAN OF CARE
Patient is to be discharged today, this writer has given stringent and detailed education regarding A fib, patient has verbalized an understanding.

## 2021-02-14 NOTE — PLAN OF CARE
"Reason for hospital stay:  New onset Afib   Living situation PTA: Home   Most recent vitals: /60   Pulse 106   Temp 97.9  F (36.6  C) (Tympanic)   Resp 18   Ht 1.702 m (5' 7\")   Wt 79.2 kg (174 lb 9.7 oz)   SpO2 97%   BMI 27.35 kg/m      Pt is A&O x4 and independent in room, she has been calm, cooperative, and pleasant this shift. VS and assessment as charted, Tele remains on and between A-fib/ A-flutter, 100-140s per ICU RN. PT has denied any feelings of flutters/ palpitations, no SOB or chest pain. Denies any N/V. IV SL. Text page sent for PRN nasal spray per pt request dt c/o nasal congestion.       Safety:  Call button within reach, calls appropriately, and makes needs known.       2/13/2021  10:55 PM  Emily Olvera RN   "

## 2021-02-14 NOTE — PLAN OF CARE
Face to face report given with opportunity to observe patient.    Report given to MICHAEL Garnica RN   2/13/2021  11:17 PM

## 2021-02-14 NOTE — DISCHARGE SUMMARY
[facility]    Discharge Summary  Hospitalist    Date of Admission:  2/12/2021  Date of Discharge:  2/14/2021  Discharging Provider: Jenaro Logan MD  Date of Service (when I saw the patient): 02/14/21    Discharge Diagnoses   Principal Problem:    New onset a-fib with RVR (H) (2/12/2021)  Active Problems:    Hypokalemia (2/12/2021)    Hypomagnesemia (2/12/2021)    Acute diastolic heart failure (H) (2/12/2021)    Simple chronic bronchitis (H) (2/12/2021)    Benign essential hypertension (2/12/2021)      History of Present Illness   Kerri Gonsales is an 75 year old female who presented with palpitations, sob.  Please see admission H+P for additional details.    Hospital Course   Kerri Gonsales was admitted on 2/12/2021. 75-year-old female with history of diastolic heart failure, hypertension who presents with a few days of dyspnea. In the emergency department she was found to be in A. fib with RVR, new diagnosis for her. She was given Cardizem IV twice in the emergency department, her rates improved, so she was admitted to the medical floor with telemetry. Patient was rate controlled with AV keyonna blockers including metoprolol, Cardizem. Medications were titrated up to goal of heart rate under 100. She was started on Xarelto on admission, tolerating well. To accommodate new rate control medications, we elected to discontinue hydrochlorothiazide as well as her propranolol. She has good rate control currently with metoprolol 50 mg twice daily as well as Cardizem 60 mg 4 times daily. We will go ahead and discharge her with metoprolol as well as Cardizem  mg daily. Since this is a weekend, she did not get echocardiogram done while here in the hospital. We will schedule this as an outpatient as well as give her a cardiology referral. We will have her follow-up with her primary care physician within the week to assess for blood pressure and heart rate on new medications.    Jenaro Logan  MD      Significant Results and Procedures   See below.    Pending Results   These results will be followed up by Maryanne Bardales    Unresulted Labs Ordered in the Past 30 Days of this Admission     No orders found from 1/13/2021 to 2/13/2021.          Code Status   Full Code       Primary Care Physician   MARYANNE BARDALES    Physical Exam   Temp: 98  F (36.7  C) Temp src: Tympanic BP: 132/65 Pulse: 84   Resp: 16 SpO2: 96 % O2 Device: None (Room air)    Vitals:    02/12/21 1624 02/13/21 0500 02/14/21 0500   Weight: 81.6 kg (180 lb) 79.2 kg (174 lb 9.7 oz) 80.5 kg (177 lb 7.5 oz)     Vital Signs with Ranges  Temp:  [97.9  F (36.6  C)-99.4  F (37.4  C)] 98  F (36.7  C)  Pulse:  [] 84  Resp:  [16-20] 16  BP: (115-140)/(44-66) 132/65  SpO2:  [93 %-97 %] 96 %  I/O last 3 completed shifts:  In: 660 [P.O.:660]  Out: 775 [Urine:775]    Constitutional: AA, NAD  Eyes: PERRLA, no injection, no icterus  HEENT: atraumatic, normocephalic  Respiratory: CTA b/l  Cardiovascular: S1 S2 irregular  GI: soft, NT, ND, + bowel sounds  Lymph/Hematologic: no palpable lymphadenopathy  Skin: no rashes, no lesions  Musculoskeletal: No edema, good tone, no deformities  Neurologic: oriented x 3, no focal deficits  Psychiatric: appropriate affect    Discharge Disposition   Discharged to home  Condition at discharge: Stable    Consultations This Hospital Stay   None    Time Spent on this Encounter   Jenaro JULES MD, personally saw the patient today and spent greater than 30 minutes discharging this patient.    Discharge Orders      Reason for your hospital stay    afib with RVR     Follow-up and recommended labs and tests     Follow up with primary care provider, MARYANNE BARDALES, within 7 days for hospital follow- up.  No follow up labs or test are needed.     Activity    Your activity upon discharge: activity as tolerated     When to contact your care team    Call your primary doctor if you have any of the following:  increased shortness of breath,  increased chest pain or palpitations.     Full Code     Echocardiogram Complete    Administration of IV contrast will be tailored to this examination per the appropriate written protocol listed in the Echocardiography department Protocol Book, or by the supervising Cardiologist. This may result in an order change.    Use of contrast is at the discretion of the supervising Cardiologist.     Diet    Follow this diet upon discharge: Orders Placed This Encounter      Combination Diet Regular Diet Adult; Low Saturated Fat Diet     Discharge Medications   Current Discharge Medication List      START taking these medications    Details   apixaban ANTICOAGULANT (ELIQUIS) 5 MG tablet Take 1 tablet (5 mg) by mouth 2 times daily  Qty: 60 tablet, Refills: 3    Associated Diagnoses: Atrial fibrillation with rapid ventricular response (H)      diltiazem ER (DILT-XR) 240 MG 24 hr ER beaded capsule Take 1 capsule (240 mg) by mouth daily  Qty: 30 capsule, Refills: 1    Associated Diagnoses: Atrial fibrillation with rapid ventricular response (H)      metoprolol tartrate (LOPRESSOR) 50 MG tablet Take 1 tablet (50 mg) by mouth 2 times daily  Qty: 60 tablet, Refills: 3    Associated Diagnoses: Atrial fibrillation with rapid ventricular response (H)         CONTINUE these medications which have NOT CHANGED    Details   albuterol (PROVENTIL) (2.5 MG/3ML) 0.083% neb solution Take 2.5 mg by nebulization every 6 hours as needed for shortness of breath / dyspnea or wheezing      Albuterol Sulfate (PROAIR HFA IN) Inhale 2 puffs into the lungs every 4 hours as needed      gentamicin (GARAMYCIN) 0.3 % ophthalmic solution INSTILL 2 DROPS INTO EACH EYE EVERY 4 HOURS AS NEEDED FOR DRY EYES      MECLIZINE HCL PO Take 25 mg by mouth as needed for dizziness         STOP taking these medications       HYDROCHLOROTHIAZIDE PO Comments:   Reason for Stopping:         Propranolol HCl (INDERAL PO) Comments:   Reason for Stopping:             Allergies    Allergies   Allergen Reactions     Aspirin      Heart palpitations     Data   Most Recent 3 CBC's:  Recent Labs   Lab Test 02/12/21  1702   WBC 6.3   HGB 13.8   MCV 89         Most Recent 3 BMP's:  Recent Labs   Lab Test 02/13/21  1220 02/13/21  0955 02/13/21  0522 02/12/21  1702 02/12/21  1702   NA  --   --  136  --  136   POTASSIUM 3.6 3.4 2.9*   < > 2.8*   CHLORIDE  --   --  99  --  100   CO2  --   --  32  --  30   BUN  --   --  13  --  17   CR  --   --  0.75  --  0.78   ANIONGAP  --   --  5  --  6   KORI  --   --  8.9  --  8.9   GLC  --   --  135*  --  138*    < > = values in this interval not displayed.     Most Recent 2 LFT's:  Recent Labs   Lab Test 02/12/21  1702   AST 15   ALT 17   ALKPHOS 72   BILITOTAL 0.7     Most Recent INR's and Anticoagulation Dosing History:  Anticoagulation Dose History     There is no flowsheet data to display.        Most Recent 3 Troponin's:  Recent Labs   Lab Test 02/12/21  2308 02/12/21  1702   TROPI <0.015 <0.015     Most Recent Cholesterol Panel:  Recent Labs   Lab Test 02/13/21  0522   CHOL 181   LDL 99   HDL 64   TRIG 90     Most Recent 6 Bacteria Isolates From Any Culture (See EPIC Reports for Culture Details):No lab results found.  Most Recent TSH, T4 and A1c Labs:  Recent Labs   Lab Test 02/13/21  0522   TSH 1.61     Results for orders placed or performed during the hospital encounter of 02/12/21   XR Chest Port 1 View    Narrative    PROCEDURE:  XR CHEST PORT 1 VW    HISTORY:  shortness of breath.     COMPARISON:  None.    FINDINGS:   The cardiac silhouette is borderline in size.. The pulmonary  vasculature is normal.  The lungs are clear. There is deviation of the  trachea in the upper mediastinum raising the possibility of a  mediastinal mass. CT scan of the chest is recommended. No pleural  effusion or pneumothorax.      Impression    IMPRESSION:  Questionable upper mediastinal mass on the left. Further  evaluation with CT scanning is recommended      JADA  MD LEVI   CTA Chest with Contrast    Narrative    PROCEDURE: CTA CHEST WITH CONTRAST 2/12/2021 7:03 PM    HISTORY: PE suspected, high prob mediastinal mass    COMPARISONS: None.    Meds/Dose Given: Isovue 370 75 ml    TECHNIQUE: CT angiogram of the chest with sagittal coronal MIPS  reconstructions    FINDINGS: CT angiogram reveals no pulmonary emboli. The thoracic aorta  is normal in caliber. There is mild cardiomegaly.    There is a large substernal goiter on the left deviating the trachea  to the right. The hilar and mediastinal lymph nodes are normal in  caliber. There is a moderate size hiatal hernia.    There are abnormal areas of increased density in the right upper and  right middle lobes suspicious for pneumonia. There is an 11 mm  diameter left lower lobe nodule.    The upper portion of the liver spleen and pancreas appear normal.  Degenerative changes are present in the thoracic spine.         Impression    IMPRESSION: Right upper lobe and right middle lobe infiltrate  suspicious for pneumonia.    substernal goiter deviating the trachea from left to right.    11 mm in diameter nodule in the left lower lobe.    No pulmonary emboli.    JADA SIMS MD

## 2021-02-14 NOTE — PLAN OF CARE
"Most recent vitals: /66   Pulse 79   Temp 98  F (36.7  C) (Tympanic)   Resp 16   Ht 1.702 m (5' 7\")   Wt 79.2 kg (174 lb 9.7 oz)   SpO2 96%   BMI 27.35 kg/m      A&O, makes needs known, uses call light appropriately, independent in room. Denies pain. Afebrile. Lungs clear, dim in LLL, dry, unproductive, infrequent cough. HR irregular, denies SOB, palpatations, chest pain, dizziness, and lightheadedness. HR 79-83, -130s/60s, tele: AFIB -125. Up to bathroom with no reports of SOB. Patient slept majority most of this shift. Regular low fat diet. IV saline  Locked. Call light in reach.    Face to face report given with opportunity to observe patient.    Report given to MICHAEL Donohue RN   2/14/2021  5:30 AM        "

## 2021-02-18 ENCOUNTER — TELEPHONE (OUTPATIENT)
Dept: CASE MANAGEMENT | Facility: HOSPITAL | Age: 76
End: 2021-02-18

## 2021-02-18 NOTE — TELEPHONE ENCOUNTER
Kerri Gonsales, was discharged to home on  from [facility]. I spoke today with her regarding the patient's discharge.   She indicates she has receive(d) sufficient information upon discharge. Medications were reviewed in full on discharge, including: Medications to be started; medications to be stopped; medications to be continued from preadmission and any side effects. Prescriptions were received at discharge and were able to be filled. Medications are being taken as prescribed.   She indicates they have an appointment scheduled for 2.17.2021 and have transportation available. They are able to confirm their appointmenShe did not have any questions regarding discharge instructions or condition.  Per their request, the following employee (s) can be recognized for their outstanding services delivered:    Suggestions to improve service:   She was informed they may receive a survey in the mail from the hospital. Asked if they would kindly complete the survey in order for [facility] to know if services fully met patient needs.

## 2021-02-22 ENCOUNTER — APPOINTMENT (OUTPATIENT)
Dept: CT IMAGING | Facility: HOSPITAL | Age: 76
DRG: 292 | End: 2021-02-22
Attending: PHYSICIAN ASSISTANT
Payer: MEDICARE

## 2021-02-22 ENCOUNTER — HOSPITAL ENCOUNTER (OUTPATIENT)
Dept: CARDIOLOGY | Facility: HOSPITAL | Age: 76
Discharge: HOME | DRG: 292 | End: 2021-02-22
Attending: INTERNAL MEDICINE | Admitting: INTERNAL MEDICINE
Payer: MEDICARE

## 2021-02-22 ENCOUNTER — HOSPITAL ENCOUNTER (INPATIENT)
Facility: HOSPITAL | Age: 76
LOS: 4 days | Discharge: HOME | DRG: 292 | End: 2021-02-26
Attending: PHYSICIAN ASSISTANT | Admitting: INTERNAL MEDICINE
Payer: MEDICARE

## 2021-02-22 DIAGNOSIS — I48.91 ATRIAL FIBRILLATION WITH RAPID VENTRICULAR RESPONSE (H): ICD-10-CM

## 2021-02-22 DIAGNOSIS — I48.91 ATRIAL FIBRILLATION WITH RVR (H): Primary | ICD-10-CM

## 2021-02-22 DIAGNOSIS — J18.9 PNEUMONIA DUE TO INFECTIOUS ORGANISM, UNSPECIFIED LATERALITY, UNSPECIFIED PART OF LUNG: ICD-10-CM

## 2021-02-22 LAB
ALBUMIN UR-MCNC: NEGATIVE MG/DL
ANION GAP SERPL CALCULATED.3IONS-SCNC: 8 MMOL/L (ref 3–14)
APPEARANCE UR: CLEAR
BACTERIA #/AREA URNS HPF: ABNORMAL /HPF
BASOPHILS # BLD AUTO: 0 10E9/L (ref 0–0.2)
BASOPHILS NFR BLD AUTO: 0.6 %
BILIRUB UR QL STRIP: NEGATIVE
BUN SERPL-MCNC: 15 MG/DL (ref 7–30)
CALCIUM SERPL-MCNC: 9 MG/DL (ref 8.5–10.1)
CHLORIDE SERPL-SCNC: 100 MMOL/L (ref 94–109)
CO2 SERPL-SCNC: 25 MMOL/L (ref 20–32)
COLOR UR AUTO: ABNORMAL
CREAT SERPL-MCNC: 0.7 MG/DL (ref 0.52–1.04)
DIFFERENTIAL METHOD BLD: ABNORMAL
EOSINOPHIL # BLD AUTO: 0.1 10E9/L (ref 0–0.7)
EOSINOPHIL NFR BLD AUTO: 0.9 %
ERYTHROCYTE [DISTWIDTH] IN BLOOD BY AUTOMATED COUNT: 13.7 % (ref 10–15)
FLUAV RNA RESP QL NAA+PROBE: NEGATIVE
FLUBV RNA RESP QL NAA+PROBE: NEGATIVE
GFR SERPL CREATININE-BSD FRML MDRD: 82 ML/MIN/{1.73_M2}
GLUCOSE SERPL-MCNC: 152 MG/DL (ref 70–99)
GLUCOSE UR STRIP-MCNC: NEGATIVE MG/DL
HCT VFR BLD AUTO: 34.6 % (ref 35–47)
HGB BLD-MCNC: 11.8 G/DL (ref 11.7–15.7)
HGB UR QL STRIP: NEGATIVE
HYALINE CASTS #/AREA URNS LPF: 1 /LPF
IMM GRANULOCYTES # BLD: 0 10E9/L (ref 0–0.4)
IMM GRANULOCYTES NFR BLD: 0.3 %
KETONES UR STRIP-MCNC: NEGATIVE MG/DL
LABORATORY COMMENT REPORT: NORMAL
LACTATE BLD-SCNC: 2.2 MMOL/L (ref 0.7–2)
LEUKOCYTE ESTERASE UR QL STRIP: ABNORMAL
LYMPHOCYTES # BLD AUTO: 1.1 10E9/L (ref 0.8–5.3)
LYMPHOCYTES NFR BLD AUTO: 16.4 %
MCH RBC QN AUTO: 30.7 PG (ref 26.5–33)
MCHC RBC AUTO-ENTMCNC: 34.1 G/DL (ref 31.5–36.5)
MCV RBC AUTO: 90 FL (ref 78–100)
MONOCYTES # BLD AUTO: 0.6 10E9/L (ref 0–1.3)
MONOCYTES NFR BLD AUTO: 8.5 %
MUCOUS THREADS #/AREA URNS LPF: PRESENT /LPF
NEUTROPHILS # BLD AUTO: 4.9 10E9/L (ref 1.6–8.3)
NEUTROPHILS NFR BLD AUTO: 73.3 %
NITRATE UR QL: NEGATIVE
NRBC # BLD AUTO: 0 10*3/UL
NRBC BLD AUTO-RTO: 0 /100
NT-PROBNP SERPL-MCNC: 8292 PG/ML (ref 0–1800)
PH UR STRIP: 5 PH (ref 4.7–8)
PLATELET # BLD AUTO: 275 10E9/L (ref 150–450)
POTASSIUM SERPL-SCNC: 3.8 MMOL/L (ref 3.4–5.3)
PROCALCITONIN SERPL-MCNC: <0.05 NG/ML
RBC # BLD AUTO: 3.84 10E12/L (ref 3.8–5.2)
RBC #/AREA URNS AUTO: 2 /HPF (ref 0–2)
RSV RNA SPEC QL NAA+PROBE: NEGATIVE
SARS-COV-2 RNA RESP QL NAA+PROBE: NEGATIVE
SODIUM SERPL-SCNC: 133 MMOL/L (ref 133–144)
SOURCE: ABNORMAL
SP GR UR STRIP: 1.01 (ref 1–1.03)
SPECIMEN SOURCE: NORMAL
SQUAMOUS #/AREA URNS AUTO: 3 /HPF (ref 0–1)
TROPONIN I SERPL-MCNC: <0.015 UG/L (ref 0–0.04)
UROBILINOGEN UR STRIP-MCNC: NORMAL MG/DL (ref 0–2)
WBC # BLD AUTO: 6.7 10E9/L (ref 4–11)
WBC #/AREA URNS AUTO: 10 /HPF (ref 0–5)

## 2021-02-22 PROCEDURE — 250N000011 HC RX IP 250 OP 636: Performed by: INTERNAL MEDICINE

## 2021-02-22 PROCEDURE — 94640 AIRWAY INHALATION TREATMENT: CPT | Mod: 76

## 2021-02-22 PROCEDURE — 84484 ASSAY OF TROPONIN QUANT: CPT | Performed by: PHYSICIAN ASSISTANT

## 2021-02-22 PROCEDURE — 87086 URINE CULTURE/COLONY COUNT: CPT | Performed by: INTERNAL MEDICINE

## 2021-02-22 PROCEDURE — 96365 THER/PROPH/DIAG IV INF INIT: CPT

## 2021-02-22 PROCEDURE — 93306 TTE W/DOPPLER COMPLETE: CPT | Mod: 26 | Performed by: INTERNAL MEDICINE

## 2021-02-22 PROCEDURE — 250N000011 HC RX IP 250 OP 636: Performed by: PHYSICIAN ASSISTANT

## 2021-02-22 PROCEDURE — 200N000001 HC R&B ICU

## 2021-02-22 PROCEDURE — 84145 PROCALCITONIN (PCT): CPT | Performed by: PHYSICIAN ASSISTANT

## 2021-02-22 PROCEDURE — 96375 TX/PRO/DX INJ NEW DRUG ADDON: CPT

## 2021-02-22 PROCEDURE — 94640 AIRWAY INHALATION TREATMENT: CPT

## 2021-02-22 PROCEDURE — 93005 ELECTROCARDIOGRAM TRACING: CPT

## 2021-02-22 PROCEDURE — 96367 TX/PROPH/DG ADDL SEQ IV INF: CPT

## 2021-02-22 PROCEDURE — 250N000013 HC RX MED GY IP 250 OP 250 PS 637: Performed by: PHYSICIAN ASSISTANT

## 2021-02-22 PROCEDURE — 83880 ASSAY OF NATRIURETIC PEPTIDE: CPT | Performed by: PHYSICIAN ASSISTANT

## 2021-02-22 PROCEDURE — 93306 TTE W/DOPPLER COMPLETE: CPT

## 2021-02-22 PROCEDURE — 250N000013 HC RX MED GY IP 250 OP 250 PS 637: Performed by: INTERNAL MEDICINE

## 2021-02-22 PROCEDURE — 99285 EMERGENCY DEPT VISIT HI MDM: CPT | Mod: 25

## 2021-02-22 PROCEDURE — 83605 ASSAY OF LACTIC ACID: CPT | Performed by: PHYSICIAN ASSISTANT

## 2021-02-22 PROCEDURE — 85025 COMPLETE CBC W/AUTO DIFF WBC: CPT | Performed by: PHYSICIAN ASSISTANT

## 2021-02-22 PROCEDURE — 87636 SARSCOV2 & INF A&B AMP PRB: CPT | Performed by: PHYSICIAN ASSISTANT

## 2021-02-22 PROCEDURE — 81001 URINALYSIS AUTO W/SCOPE: CPT | Performed by: INTERNAL MEDICINE

## 2021-02-22 PROCEDURE — 71250 CT THORAX DX C-: CPT

## 2021-02-22 PROCEDURE — 250N000009 HC RX 250: Performed by: INTERNAL MEDICINE

## 2021-02-22 PROCEDURE — 80048 BASIC METABOLIC PNL TOTAL CA: CPT | Performed by: PHYSICIAN ASSISTANT

## 2021-02-22 PROCEDURE — 99223 1ST HOSP IP/OBS HIGH 75: CPT | Mod: AI | Performed by: INTERNAL MEDICINE

## 2021-02-22 PROCEDURE — 999N000157 HC STATISTIC RCP TIME EA 10 MIN

## 2021-02-22 PROCEDURE — C9803 HOPD COVID-19 SPEC COLLECT: HCPCS

## 2021-02-22 PROCEDURE — 250N000009 HC RX 250: Performed by: PHYSICIAN ASSISTANT

## 2021-02-22 PROCEDURE — 99285 EMERGENCY DEPT VISIT HI MDM: CPT | Performed by: PHYSICIAN ASSISTANT

## 2021-02-22 RX ORDER — ONDANSETRON 2 MG/ML
4 INJECTION INTRAMUSCULAR; INTRAVENOUS EVERY 6 HOURS PRN
Status: DISCONTINUED | OUTPATIENT
Start: 2021-02-22 | End: 2021-02-26 | Stop reason: HOSPADM

## 2021-02-22 RX ORDER — DOXYCYCLINE 100 MG/1
100 CAPSULE ORAL EVERY 12 HOURS SCHEDULED
Status: DISCONTINUED | OUTPATIENT
Start: 2021-02-23 | End: 2021-02-24

## 2021-02-22 RX ORDER — CEFTRIAXONE SODIUM 1 G/50ML
1 INJECTION, SOLUTION INTRAVENOUS EVERY 24 HOURS
Status: DISCONTINUED | OUTPATIENT
Start: 2021-02-23 | End: 2021-02-24

## 2021-02-22 RX ORDER — METOPROLOL TARTRATE 1 MG/ML
5 INJECTION, SOLUTION INTRAVENOUS ONCE
Status: COMPLETED | OUTPATIENT
Start: 2021-02-22 | End: 2021-02-22

## 2021-02-22 RX ORDER — NICOTINE POLACRILEX 4 MG
15-30 LOZENGE BUCCAL
Status: DISCONTINUED | OUTPATIENT
Start: 2021-02-22 | End: 2021-02-25

## 2021-02-22 RX ORDER — PROCHLORPERAZINE 25 MG
12.5 SUPPOSITORY, RECTAL RECTAL EVERY 12 HOURS PRN
Status: DISCONTINUED | OUTPATIENT
Start: 2021-02-22 | End: 2021-02-25

## 2021-02-22 RX ORDER — DEXTROSE MONOHYDRATE 25 G/50ML
25-50 INJECTION, SOLUTION INTRAVENOUS
Status: DISCONTINUED | OUTPATIENT
Start: 2021-02-22 | End: 2021-02-25

## 2021-02-22 RX ORDER — AMOXICILLIN 250 MG
2 CAPSULE ORAL 2 TIMES DAILY PRN
Status: DISCONTINUED | OUTPATIENT
Start: 2021-02-22 | End: 2021-02-26 | Stop reason: HOSPADM

## 2021-02-22 RX ORDER — DILTIAZEM HYDROCHLORIDE 30 MG/1
60 TABLET, FILM COATED ORAL ONCE
Status: COMPLETED | OUTPATIENT
Start: 2021-02-22 | End: 2021-02-22

## 2021-02-22 RX ORDER — FUROSEMIDE 10 MG/ML
20 INJECTION INTRAMUSCULAR; INTRAVENOUS ONCE
Status: COMPLETED | OUTPATIENT
Start: 2021-02-22 | End: 2021-02-22

## 2021-02-22 RX ORDER — LACTOBACILLUS RHAMNOSUS GG 10B CELL
1 CAPSULE ORAL 2 TIMES DAILY
Status: DISCONTINUED | OUTPATIENT
Start: 2021-02-22 | End: 2021-02-26 | Stop reason: HOSPADM

## 2021-02-22 RX ORDER — AMOXICILLIN 250 MG
1 CAPSULE ORAL 2 TIMES DAILY PRN
Status: DISCONTINUED | OUTPATIENT
Start: 2021-02-22 | End: 2021-02-26 | Stop reason: HOSPADM

## 2021-02-22 RX ORDER — MECLIZINE HYDROCHLORIDE 25 MG/1
25 TABLET ORAL 3 TIMES DAILY PRN
Status: DISCONTINUED | OUTPATIENT
Start: 2021-02-22 | End: 2021-02-25

## 2021-02-22 RX ORDER — CEFTRIAXONE SODIUM 2 G/50ML
2 INJECTION, SOLUTION INTRAVENOUS ONCE
Status: COMPLETED | OUTPATIENT
Start: 2021-02-22 | End: 2021-02-22

## 2021-02-22 RX ORDER — ALBUTEROL SULFATE 90 UG/1
6 AEROSOL, METERED RESPIRATORY (INHALATION) ONCE
Status: COMPLETED | OUTPATIENT
Start: 2021-02-22 | End: 2021-02-22

## 2021-02-22 RX ORDER — ONDANSETRON 4 MG/1
4 TABLET, ORALLY DISINTEGRATING ORAL EVERY 6 HOURS PRN
Status: DISCONTINUED | OUTPATIENT
Start: 2021-02-22 | End: 2021-02-26 | Stop reason: HOSPADM

## 2021-02-22 RX ORDER — DILTIAZEM HCL IN NACL,ISO-OSM 125 MG/125
5-15 PLASTIC BAG, INJECTION (ML) INTRAVENOUS CONTINUOUS
Status: DISCONTINUED | OUTPATIENT
Start: 2021-02-22 | End: 2021-02-25

## 2021-02-22 RX ORDER — ACETAMINOPHEN 325 MG/1
650 TABLET ORAL EVERY 4 HOURS PRN
Status: DISCONTINUED | OUTPATIENT
Start: 2021-02-22 | End: 2021-02-26 | Stop reason: HOSPADM

## 2021-02-22 RX ORDER — ALBUTEROL SULFATE 0.83 MG/ML
2.5 SOLUTION RESPIRATORY (INHALATION)
Status: DISCONTINUED | OUTPATIENT
Start: 2021-02-22 | End: 2021-02-26 | Stop reason: HOSPADM

## 2021-02-22 RX ORDER — METOPROLOL TARTRATE 1 MG/ML
10 INJECTION, SOLUTION INTRAVENOUS EVERY 6 HOURS
Status: DISCONTINUED | OUTPATIENT
Start: 2021-02-22 | End: 2021-02-23

## 2021-02-22 RX ORDER — IPRATROPIUM BROMIDE AND ALBUTEROL SULFATE 2.5; .5 MG/3ML; MG/3ML
3 SOLUTION RESPIRATORY (INHALATION) DAILY
Status: DISCONTINUED | OUTPATIENT
Start: 2021-02-22 | End: 2021-02-26 | Stop reason: HOSPADM

## 2021-02-22 RX ORDER — DOXYCYCLINE 100 MG/1
100 CAPSULE ORAL ONCE
Status: COMPLETED | OUTPATIENT
Start: 2021-02-22 | End: 2021-02-22

## 2021-02-22 RX ORDER — CALCIUM CARBONATE 500 MG/1
1000 TABLET, CHEWABLE ORAL 4 TIMES DAILY PRN
Status: DISCONTINUED | OUTPATIENT
Start: 2021-02-22 | End: 2021-02-26 | Stop reason: HOSPADM

## 2021-02-22 RX ORDER — GENTAMICIN SULFATE 3 MG/ML
2 SOLUTION/ DROPS OPHTHALMIC 4 TIMES DAILY PRN
Status: DISCONTINUED | OUTPATIENT
Start: 2021-02-22 | End: 2021-02-23

## 2021-02-22 RX ORDER — DIGOXIN 0.25 MG/ML
250 INJECTION INTRAMUSCULAR; INTRAVENOUS ONCE
Status: COMPLETED | OUTPATIENT
Start: 2021-02-22 | End: 2021-02-22

## 2021-02-22 RX ORDER — PROCHLORPERAZINE MALEATE 5 MG
5 TABLET ORAL EVERY 6 HOURS PRN
Status: DISCONTINUED | OUTPATIENT
Start: 2021-02-22 | End: 2021-02-25

## 2021-02-22 RX ADMIN — FUROSEMIDE 20 MG: 10 INJECTION, SOLUTION INTRAMUSCULAR; INTRAVENOUS at 20:44

## 2021-02-22 RX ADMIN — Medication 7.5 MG/HR: at 19:01

## 2021-02-22 RX ADMIN — METOPROLOL TARTRATE 5 MG: 5 INJECTION INTRAVENOUS at 15:33

## 2021-02-22 RX ADMIN — RIVAROXABAN 20 MG: 20 TABLET, FILM COATED ORAL at 18:38

## 2021-02-22 RX ADMIN — DILTIAZEM HYDROCHLORIDE 60 MG: 30 TABLET, FILM COATED ORAL at 16:36

## 2021-02-22 RX ADMIN — IPRATROPIUM BROMIDE AND ALBUTEROL SULFATE 3 ML: .5; 3 SOLUTION RESPIRATORY (INHALATION) at 19:42

## 2021-02-22 RX ADMIN — DILTIAZEM HYDROCHLORIDE 60 MG: 30 TABLET, FILM COATED ORAL at 15:33

## 2021-02-22 RX ADMIN — CEFTRIAXONE SODIUM 2 G: 2 INJECTION, SOLUTION INTRAVENOUS at 16:12

## 2021-02-22 RX ADMIN — Medication 5 MG/HR: at 17:56

## 2021-02-22 RX ADMIN — DIGOXIN 250 MCG: 0.25 INJECTION INTRAMUSCULAR; INTRAVENOUS at 20:44

## 2021-02-22 RX ADMIN — DOXYCYCLINE HYCLATE 100 MG: 100 CAPSULE ORAL at 16:12

## 2021-02-22 RX ADMIN — Medication 1 CAPSULE: at 20:52

## 2021-02-22 RX ADMIN — METOPROLOL TARTRATE 10 MG: 5 INJECTION INTRAVENOUS at 22:36

## 2021-02-22 RX ADMIN — METOPROLOL TARTRATE 5 MG: 5 INJECTION INTRAVENOUS at 16:02

## 2021-02-22 RX ADMIN — ALBUTEROL SULFATE 6 PUFF: 90 AEROSOL, METERED RESPIRATORY (INHALATION) at 14:52

## 2021-02-22 ASSESSMENT — MIFFLIN-ST. JEOR
SCORE: 1349.63
SCORE: 1306.89

## 2021-02-22 ASSESSMENT — ACTIVITIES OF DAILY LIVING (ADL): ADLS_ACUITY_SCORE: 15

## 2021-02-22 NOTE — ED NOTES
DATE:  2/22/2021   TIME OF RECEIPT FROM LAB:  2726  LAB TEST:  lactic  LAB VALUE:  2.2  RESULTS GIVEN WITH READ-BACK TO (PROVIDER):  Adolfo Page PA-C  TIME LAB VALUE REPORTED TO PROVIDER:   1514

## 2021-02-22 NOTE — ED NOTES
"Pt c/o exertional SOB, states she does feel recovered after resting. States she has been feeling some palpitations today and last night.  Pt states she has been eating well, drinking well, no GI/ s/s. States she takes her medications \"religeously\".  Pt states she just finished Azithromycin last night for PNA.  "

## 2021-02-22 NOTE — ED NOTES
"Pt did well ambulating to BR independently-ready gait, denies dizziness's.  Pt does express concerns going home, \" I dont want to get sick and come back all over again.\"  HR remains afib 120-160s.   "

## 2021-02-22 NOTE — ED TRIAGE NOTES
Pt diagnosed with new onset At. FIb on 2/12/20. Hospitalized for a couple days. Just completed an echocardiogram at [facility]. Pt states she has exertional SOB as well as SOB at rest.

## 2021-02-22 NOTE — ED PROVIDER NOTES
History     Chief Complaint   Patient presents with     Shortness of Breath     HX OF NEW ONSET  A. Fib 2/12/20. Feeling very SOB today also at rest.     HPI  Kerri Gonsales is a 75 year old female who is here with shortness of breath.  She had an admission here in 2/12/2020 for new onset A. fib.  Was found to have some type of infiltrate on CT.  She has been on Xarelto since 28 January which I believe is when the discovered A. Fib.  Also she is having increasing shortness of breath.  She does have a cough is nonproductive she did finish Zithromax today for whatever infiltrate she had on her x-ray.  She denies any fever or chills.  No lightheadedness she does does feel palpitations at times.  Heart rate is anywhere from 130-160 currently.  No abdominal pain no nausea vomiting diarrhea no diaphoresis no dysuria no numbness tingling weakness to any of her extremities    Allergies:  Allergies   Allergen Reactions     Aspirin      Heart palpitations       Problem List:    Patient Active Problem List    Diagnosis Date Noted     Atrial fibrillation with RVR (H) 02/22/2021     Priority: Medium     Pneumonia due to infectious organism, unspecified laterality, unspecified part of lung 02/22/2021     Priority: Medium     Hypokalemia 02/12/2021     Priority: Medium     Hypomagnesemia 02/12/2021     Priority: Medium     New onset a-fib (H) 02/12/2021     Priority: Medium     Acute diastolic heart failure (H) 02/12/2021     Priority: Medium     Simple chronic bronchitis (H) 02/12/2021     Priority: Medium     Benign essential hypertension 02/12/2021     Priority: Medium        Past Medical History:    No past medical history on file.    Past Surgical History:    No past surgical history on file.    Family History:    No family history on file.    Social History:  Marital Status:   [4]  Social History     Tobacco Use     Smoking status: Never Smoker     Smokeless tobacco: Never Used   Substance Use Topics     Alcohol  "use: Not Currently     Drug use: Never        Medications:    diltiazem ER (DILT-XR) 240 MG 24 hr ER beaded capsule  metoprolol tartrate (LOPRESSOR) 50 MG tablet  rivaroxaban ANTICOAGULANT (XARELTO) 20 MG TABS tablet  albuterol (PROVENTIL) (2.5 MG/3ML) 0.083% neb solution  Albuterol Sulfate (PROAIR HFA IN)  gentamicin (GARAMYCIN) 0.3 % ophthalmic solution  MECLIZINE HCL PO          Review of Systems  I did do a complete 10 point review of systems. Pertinent positives and negatives listed per HPI. All other systems have been reviewed and are negative.      Physical Exam   BP: 132/76  Pulse: 68  Temp: 98  F (36.7  C)  Resp: 18  Height: 170.2 cm (5' 7\")  Weight: 78.9 kg (174 lb)  SpO2: 98 %      Physical Exam  Vitals signs and nursing note reviewed.   Constitutional:       General: She is not in acute distress.     Appearance: Normal appearance. She is not ill-appearing, toxic-appearing or diaphoretic.   Eyes:      General: No scleral icterus.     Conjunctiva/sclera: Conjunctivae normal.   Neck:      Musculoskeletal: Neck supple.   Cardiovascular:      Rate and Rhythm: Tachycardia present.      Comments: Heart rate ranging from 130s to 160s  Pulmonary:      Effort: Pulmonary effort is normal. No respiratory distress.      Breath sounds: Normal breath sounds.   Abdominal:      Palpations: Abdomen is soft.      Tenderness: There is no abdominal tenderness.   Musculoskeletal:         General: Swelling present.      Right lower leg: Edema present.      Left lower leg: Edema present.   Skin:     General: Skin is warm and dry.   Neurological:      General: No focal deficit present.      Mental Status: She is alert and oriented to person, place, and time.   Psychiatric:         Mood and Affect: Mood normal.         Behavior: Behavior normal.         Thought Content: Thought content normal.         Judgment: Judgment normal.         ED Course   She received 60 mg of p.o. Cardizem instant release plus another 60 mg p.o. " Cardizem extremities two 5 mg IV doses of Lopressor and her rate still continues to be under control.  I did do a noncontrast CAT scan of the chest looks like she has multi lobar pneumonia now laboratories were reviewed the really nonrevealing she has a normal white count.  Procalcitonin is normal her troponin is negative BNP is pending.  She had a bad reaction which included a Achilles tendinitis from quinolone she just finished Zithromax we will give her 2 g IV Rocephin for the multilobar pneumonia as well as the 100 mg p.o. doxycycline.  Ultimately restarted on Cardizem drip for rate control she will be admitted to the ICU Dr. Reyes hospitalist    Results for orders placed or performed during the hospital encounter of 02/22/21 (from the past 24 hour(s))   CBC with platelets differential   Result Value Ref Range    WBC 6.7 4.0 - 11.0 10e9/L    RBC Count 3.84 3.8 - 5.2 10e12/L    Hemoglobin 11.8 11.7 - 15.7 g/dL    Hematocrit 34.6 (L) 35.0 - 47.0 %    MCV 90 78 - 100 fl    MCH 30.7 26.5 - 33.0 pg    MCHC 34.1 31.5 - 36.5 g/dL    RDW 13.7 10.0 - 15.0 %    Platelet Count 275 150 - 450 10e9/L    Diff Method Automated Method     % Neutrophils 73.3 %    % Lymphocytes 16.4 %    % Monocytes 8.5 %    % Eosinophils 0.9 %    % Basophils 0.6 %    % Immature Granulocytes 0.3 %    Nucleated RBCs 0 0 /100    Absolute Neutrophil 4.9 1.6 - 8.3 10e9/L    Absolute Lymphocytes 1.1 0.8 - 5.3 10e9/L    Absolute Monocytes 0.6 0.0 - 1.3 10e9/L    Absolute Eosinophils 0.1 0.0 - 0.7 10e9/L    Absolute Basophils 0.0 0.0 - 0.2 10e9/L    Abs Immature Granulocytes 0.0 0 - 0.4 10e9/L    Absolute Nucleated RBC 0.0    Basic metabolic panel   Result Value Ref [facility]    Sodium 133 133 - 144 mmol/L    Potassium 3.8 3.4 - 5.3 mmol/L    Chloride 100 94 - 109 mmol/L    Carbon Dioxide 25 20 - 32 mmol/L    Anion Gap 8 3 - 14 mmol/L    Glucose 152 (H) 70 - 99 mg/dL    Urea Nitrogen 15 7 - 30 mg/dL    Creatinine 0.70 0.52 - 1.04 mg/dL    GFR Estimate 82  >60 mL/min/[1.73_m2]    GFR Estimate If Black >90 >60 mL/min/[1.73_m2]    Calcium 9.0 8.5 - 10.1 mg/dL   Troponin I   Result Value Ref [facility]    Troponin I ES <0.015 0.000 - 0.045 ug/L   Procalcitonin   Result Value Ref [facility]    Procalcitonin <0.05 ng/ml   Lactic acid whole blood   Result Value Ref [facility]    Lactic Acid 2.2 (H) 0.7 - 2.0 mmol/L   Chest CT w/o contrast    Narrative    CT CHEST W/O CONTRAST  2/22/2021 2:49 PM    CLINICAL HISTORY: Female, age 75 years,  Pneumonia, effusion or  abscess suspected, xray done;    Comparison:  CT scan of chest 2/12/2021    TECHNIQUE:  CT was performed of the chest  without contrast.   Sagittal, coronal, axial and MIP reconstructions were reviewed.     FINDINGS:  Chest CT:    Lungs: There is worsening consolidation now seen throughout the  anterior segment of the left upper lobe, anterior and apical segment  of the right upper lobe, medial segment of the right medial lobe and  anterior segment of the right lower lobe.    Pleura: Small left and moderate size right pleural effusions have  developed since the prior examination.    Lymph nodes: There are number of normal-sized and mildly enlarged  lymph nodes are seen throughout the mediastinum and hilar regions,  more evident on the right.    Thyroid gland is similar appearance with a large 5 cm mass again seen  within the left lobe.    Esophagus: Moderate size hiatal hernia is similar in appearance  without acute abnormality.    Bony structures: No acute abnormality. Degenerative changes throughout  the thoracic spine are similar in appearance.      Upper abdomen: No acute abnormality.      Impression    IMPRESSION:   Worsening multifocal pneumonia with new small left and small a  moderate size right effusion and worsening mediastinal/hilar  lymphadenopathy.     Stable appearance of 11.5 x 9 mm subpleural nodule located laterally  in the left lower lobe.    IZZY ROWELL MD       Medications   diltiazem (CARDIZEM) 125 mg in  sodium chloride 0.7 % 125 mL infusion (has no administration in time range)   albuterol (PROAIR HFA/PROVENTIL HFA/VENTOLIN HFA) 108 (90 Base) MCG/ACT inhaler 6 puff (6 puffs Inhalation Given 2/22/21 1452)   diltiazem (CARDIZEM) tablet 60 mg (60 mg Oral Given 2/22/21 1533)   metoprolol (LOPRESSOR) injection 5 mg (5 mg Intravenous Given 2/22/21 1533)   metoprolol (LOPRESSOR) injection 5 mg (5 mg Intravenous Given 2/22/21 1602)   cefTRIAXone IN D5W (ROCEPHIN) intermittent infusion 2 g (0 g Intravenous Stopped 2/22/21 1639)   doxycycline hyclate (VIBRAMYCIN) capsule 100 mg (100 mg Oral Given 2/22/21 1612)   diltiazem (CARDIZEM) tablet 60 mg (60 mg Oral Given 2/22/21 1636)       Assessments & Plan (with Medical Decision Making)     I have reviewed the nursing notes.    I have reviewed the findings, diagnosis, plan and need for follow up with the patient.    Admit to the hospitalist service Dr. Reyes  New Prescriptions    No medications on file       Final diagnoses:   Atrial fibrillation with RVR (H)   Pneumonia due to infectious organism, unspecified laterality, unspecified part of lung       2/22/2021   [facility]

## 2021-02-23 LAB
ALBUMIN SERPL-MCNC: 2.9 G/DL (ref 3.4–5)
ALP SERPL-CCNC: 63 U/L (ref 40–150)
ALT SERPL W P-5'-P-CCNC: 32 U/L (ref 0–50)
ANION GAP SERPL CALCULATED.3IONS-SCNC: 7 MMOL/L (ref 3–14)
AST SERPL W P-5'-P-CCNC: 20 U/L (ref 0–45)
BILIRUB SERPL-MCNC: 0.5 MG/DL (ref 0.2–1.3)
BUN SERPL-MCNC: 16 MG/DL (ref 7–30)
CALCIUM SERPL-MCNC: 8.5 MG/DL (ref 8.5–10.1)
CHLORIDE SERPL-SCNC: 100 MMOL/L (ref 94–109)
CO2 SERPL-SCNC: 27 MMOL/L (ref 20–32)
CREAT SERPL-MCNC: 0.8 MG/DL (ref 0.52–1.04)
ERYTHROCYTE [DISTWIDTH] IN BLOOD BY AUTOMATED COUNT: 13.8 % (ref 10–15)
GFR SERPL CREATININE-BSD FRML MDRD: 71 ML/MIN/{1.73_M2}
GLUCOSE SERPL-MCNC: 159 MG/DL (ref 70–99)
HCT VFR BLD AUTO: 31.8 % (ref 35–47)
HGB BLD-MCNC: 10.8 G/DL (ref 11.7–15.7)
LACTATE BLD-SCNC: 1.1 MMOL/L (ref 0.7–2)
MCH RBC QN AUTO: 30.8 PG (ref 26.5–33)
MCHC RBC AUTO-ENTMCNC: 34 G/DL (ref 31.5–36.5)
MCV RBC AUTO: 91 FL (ref 78–100)
PLATELET # BLD AUTO: 206 10E9/L (ref 150–450)
POTASSIUM SERPL-SCNC: 3.3 MMOL/L (ref 3.4–5.3)
PROT SERPL-MCNC: 6.6 G/DL (ref 6.8–8.8)
RBC # BLD AUTO: 3.51 10E12/L (ref 3.8–5.2)
SODIUM SERPL-SCNC: 134 MMOL/L (ref 133–144)
TROPONIN I SERPL-MCNC: <0.015 UG/L (ref 0–0.04)
WBC # BLD AUTO: 6.4 10E9/L (ref 4–11)

## 2021-02-23 PROCEDURE — 258N000003 HC RX IP 258 OP 636: Performed by: INTERNAL MEDICINE

## 2021-02-23 PROCEDURE — 250N000013 HC RX MED GY IP 250 OP 250 PS 637: Performed by: INTERNAL MEDICINE

## 2021-02-23 PROCEDURE — 85027 COMPLETE CBC AUTOMATED: CPT | Performed by: INTERNAL MEDICINE

## 2021-02-23 PROCEDURE — 36415 COLL VENOUS BLD VENIPUNCTURE: CPT | Performed by: INTERNAL MEDICINE

## 2021-02-23 PROCEDURE — 83605 ASSAY OF LACTIC ACID: CPT | Performed by: INTERNAL MEDICINE

## 2021-02-23 PROCEDURE — 999N000157 HC STATISTIC RCP TIME EA 10 MIN

## 2021-02-23 PROCEDURE — 94640 AIRWAY INHALATION TREATMENT: CPT

## 2021-02-23 PROCEDURE — 200N000001 HC R&B ICU

## 2021-02-23 PROCEDURE — 250N000009 HC RX 250: Performed by: INTERNAL MEDICINE

## 2021-02-23 PROCEDURE — 80053 COMPREHEN METABOLIC PANEL: CPT | Performed by: INTERNAL MEDICINE

## 2021-02-23 PROCEDURE — 250N000011 HC RX IP 250 OP 636: Performed by: INTERNAL MEDICINE

## 2021-02-23 PROCEDURE — 84484 ASSAY OF TROPONIN QUANT: CPT | Performed by: INTERNAL MEDICINE

## 2021-02-23 PROCEDURE — 99233 SBSQ HOSP IP/OBS HIGH 50: CPT | Performed by: INTERNAL MEDICINE

## 2021-02-23 RX ORDER — METOPROLOL TARTRATE 50 MG
50 TABLET ORAL 2 TIMES DAILY
Status: DISCONTINUED | OUTPATIENT
Start: 2021-02-23 | End: 2021-02-26 | Stop reason: HOSPADM

## 2021-02-23 RX ADMIN — IPRATROPIUM BROMIDE AND ALBUTEROL SULFATE 3 ML: .5; 3 SOLUTION RESPIRATORY (INHALATION) at 08:11

## 2021-02-23 RX ADMIN — Medication 1 CAPSULE: at 08:58

## 2021-02-23 RX ADMIN — Medication 1 CAPSULE: at 21:04

## 2021-02-23 RX ADMIN — AMIODARONE HYDROCHLORIDE 150 MG: 1.5 INJECTION, SOLUTION INTRAVENOUS at 01:54

## 2021-02-23 RX ADMIN — AMIODARONE HYDROCHLORIDE 1 MG/MIN: 50 INJECTION, SOLUTION INTRAVENOUS at 02:48

## 2021-02-23 RX ADMIN — DOXYCYCLINE HYCLATE 100 MG: 100 CAPSULE ORAL at 21:04

## 2021-02-23 RX ADMIN — CEFTRIAXONE SODIUM 1 G: 1 INJECTION, SOLUTION INTRAVENOUS at 17:34

## 2021-02-23 RX ADMIN — METOPROLOL TARTRATE 50 MG: 50 TABLET, FILM COATED ORAL at 12:20

## 2021-02-23 RX ADMIN — AMIODARONE HYDROCHLORIDE 0.5 MG/MIN: 50 INJECTION, SOLUTION INTRAVENOUS at 08:56

## 2021-02-23 RX ADMIN — DOXYCYCLINE HYCLATE 100 MG: 100 CAPSULE ORAL at 08:58

## 2021-02-23 RX ADMIN — RIVAROXABAN 20 MG: 20 TABLET, FILM COATED ORAL at 17:33

## 2021-02-23 RX ADMIN — METOPROLOL TARTRATE 50 MG: 50 TABLET, FILM COATED ORAL at 21:04

## 2021-02-23 ASSESSMENT — ACTIVITIES OF DAILY LIVING (ADL)
ADLS_ACUITY_SCORE: 15

## 2021-02-23 ASSESSMENT — MIFFLIN-ST. JEOR: SCORE: 1350.63

## 2021-02-23 NOTE — PLAN OF CARE
Dr. Reyes at bedside. Updated on patient's HR 120s-150s AFIB on cardizem drip 5mg/hr. Have not yet given IV digoxin due to difficult IV placement. Per MD, goal HR , give digoxin and titrate cardizem up as needed. Will continue to monitor closely.

## 2021-02-23 NOTE — H&P
[facility]    History and Physical - Hospitalist Service       Date of Admission:  2/22/2021    Assessment & Plan   Kerri Gonsales is a 75 year old female admitted on 2/22/2021 into the ICU    Multifocal left upper lobe CAP, worsened from recent hospital admit: attempt sputum, empiric antibiotics, RT treatments, incentive spirometry    Elevated lactic acid: sepsis criteria are not met (only HR can be counted) and the patient doesn't appear toxic. IV fluids are avoided as we trial acute CHF treatments. Will trend lactic acid    AFIB with RVR: probably affected by pneumonia: will use Diltiazem gtt, IV Metoprolol and a Digoxin one-time dose, cardiac monitoring.      There was little response to this approach, so switched to Amiodarone gtt    Elevated BNP: increased from recent admit.  Current ECHO points to chronic systolic CHF with borderline EF. Given age, she likely has chronic diastolic CHF component as well. Trial IV Lasix dose directed at acute on chronic combined CHF. This may also be contributing to the AFIB RVR.    General: check UA     Diet: Regular Diet Adult    DVT Prophylaxis: is on Xarelto for AFIB  Smith Catheter: not present  Code Status:   full  Rule Out COVID-19 Handoff: unsure, based on CT exam; COVID testing is pending    Disposition Plan   Expected discharge: 2 - 3 days, recommended to prior living arrangement once discharge plan established.  Entered: Scotty Reyes DO 02/22/2021, 6:08 PM     The patient's care was discussed with the Patient.    Scotty Reyes DO  [facility]  Contact information available via [facility] Paging/Directory      ______________________________________________________________________    Chief Complaint   Palpitations, increased nasal congestion, increased sputum production, fatigue    History is obtained from the patient, ER Provider, EHR review    History of Present Illness   Kerri Gonsales is a 75 year old female who has generally been doing  well and who was recently admitted to this hospital with a  Diagnosis list that included AFIB with RVR and acute diastolic CHF, based on an elevated BNP.     Rate control was achieved and she was discharged with follow up with Primary care and out patient ECHO cardiography.    Her Primary care Provider decided to prescribe Zithromax for the hospital bronchitis that was treated with RT treatments; she completed the ECHO just prior to seeking help from this ER.     She noted that the chief complaints had started about two days ago and worsened; during the ECHO encounter, she felt substantially short of breath.     ER Course: vitals showed HR variability with rates into the 160's; otherwise vitals were stable. The patient wasn't in distress. Lab diagnostics resulted a normal Troponin. The heme and chemistry profiles were unremarkable. Lactic acid was elevated (2.2), procalcitonin was normal. BNP had increased from 6001 10 days ago (recent hospital admit) to 8292 today. CT scanning showed worsening pneumonia. Rate control was attempted, but was not acceptable and antibiotics were started    Review of Systems       Constitutional: No fever or chills, worsening generalized weakness, no unintentional weight change, some loss of appetite  Ears, Nose & Throat: no sore throat, no nasal drainage, no congestion. No ear pain, no ear drainage, no particular change in hearing  Eyes: no particular change in vision, no redness, no drainage  Cardiovascular: No chest pain at rest, no chest pain with familiar activities, but worsening palpitations and associated anxiety  Pulmonary: worsening cough, producing mostly clear sputum, increase in work of breathing, more shortness of breath with position changes or familiar activites  Gastrointestinal: No abdominal pain, no nausea, no vomiting, no diarrhea. No particular change in bowel movement pattern, no black stools, no bloody stools  Genitourinary: No particular change in incontinence,  no pain with urination, no particular change in stream, no particular change in amount urinated with urge, no discharge  Skin: No particular change in bruising, no rashes  Musculoskeletal: no particular change in strength, no particular change in muscle development  Neurological: no numbness and tingling, no headache, no particular change in balance, no dizziness  Psychologic: No particular change in depression and/or anxiety  Endocrine: No particular change in heat or cold intolerance        Past Medical History    I have reviewed this patient's medical history and updated it with pertinent information if needed.   No past medical history on file.    Past Surgical History   I have reviewed this patient's surgical history and updated it with pertinent information if needed.  No past surgical history on file.    Social History   I have reviewed this patient's social history and updated it with pertinent information if needed.  Social History     Tobacco Use     Smoking status: Never Smoker     Smokeless tobacco: Never Used   Substance Use Topics     Alcohol use: Not Currently     Drug use: Never       Family History         Prior to Admission Medications   Prior to Admission Medications   Prescriptions Last Dose Informant Patient Reported? Taking?   Albuterol Sulfate (PROAIR HFA IN)   Yes No   Sig: Inhale 2 puffs into the lungs every 4 hours as needed   MECLIZINE HCL PO   Yes No   Sig: Take 25 mg by mouth as needed for dizziness   albuterol (PROVENTIL) (2.5 MG/3ML) 0.083% neb solution  Self Yes No   Sig: Take 2.5 mg by nebulization every 6 hours as needed for shortness of breath / dyspnea or wheezing   diltiazem ER (DILT-XR) 240 MG 24 hr ER beaded capsule 2/22/2021 at Unknown time  No Yes   Sig: Take 1 capsule (240 mg) by mouth daily   gentamicin (GARAMYCIN) 0.3 % ophthalmic solution   Yes No   Sig: INSTILL 2 DROPS INTO EACH EYE EVERY 4 HOURS AS NEEDED FOR DRY EYES   metoprolol tartrate (LOPRESSOR) 50 MG tablet  "2/22/2021 at Unknown time  No Yes   Sig: Take 1 tablet (50 mg) by mouth 2 times daily   rivaroxaban ANTICOAGULANT (XARELTO) 20 MG TABS tablet 2/21/2021 at Unknown time  No Yes   Sig: Take 1 tablet (20 mg) by mouth daily (with dinner)      Facility-Administered Medications: None     Allergies   Allergies   Allergen Reactions     Aspirin      Heart palpitations       Physical Exam   Vital Signs: Temp: 98  F (36.7  C) Temp src: Tympanic BP: 124/93 Pulse: 113   Resp: 15 SpO2: 98 % O2 Device: None (Room air)    Weight: 174 lbs 0 oz     Case reviewed with the ER Provider, EHR reviewed; patient seen in ER room 4    Vital signs:  Temp: 98  F (36.7  C) Temp src: Tympanic BP: 101/80 Pulse: 80   Resp: 20 SpO2: 98 % O2 Device: None (Room air)   Height: 170.2 cm (5' 7\") Weight: 78.9 kg (174 lb)  Estimated body mass index is 27.25 kg/m  as calculated from the following:    Height as of this encounter: 1.702 m (5' 7\").    Weight as of this encounter: 78.9 kg (174 lb).      General: No distress, interactive, selecting meal options from a menu  Head: normocephalic, no obvious trauma  Eyes: Gaze directed normally, sclera clear, no discharge, no abnormal ocular movements  Ears: Normal appearing age-related external ears, no discharge, stable hearing acuity loss  Nose: Normal age-related appearance  Mouth: Normal appearing oral mucosa, Gums and throat appear age-related normal  Neck: Normal age-related appearance, age-related range of motion, supple, no adenopathy  Pulmonary: Normal work of breathing, no expiratory delay, no coarseness, occasional light expiratory scattered wheezing  Cardiovascular: Distant heart sounds, irregular fast rhythm. The monitor showed variable rates into the 160's   Abdomen: No obvious distention, soft, bowel sounds present with normal frequency and pitch  Rectal: Deferred  Back: Age-related normal  Skin: Age-related normal, no rashes  Extremities: Not tender, no lower extremity edema. Moving upper and lower " extremities  Neurological: Grossly in tact  Psychiatric: Mood is stable, appropriately interactive          Data   Data reviewed today: I reviewed all medications, new labs and imaging results over the last 24 hours

## 2021-02-23 NOTE — PROGRESS NOTES
[facility]    Hospitalist Progress Note      Assessment & Plan   Kerri Gonsales is a 75 year old female who was admitted on 2/22/2021.      1.  Atrial fibrillation with rapid ventricular response: Patient presented earlier this month was rate controlled with Cardizem and beta-blocker.  Initiated on Xarelto.  Discharged home.  Follow-up with primary care provider Dr. Bardales.  That appointment things appear to be okay.  Charted heart rate was in the 70s.  Patient states she has been feeling palpitations off and on.  Seemingly got worse over the last several days with some increasing dyspnea.  No chest pain or syncope.  She had her echocardiogram performed and after this procedures was quite symptomatic and presented to the ER for reevaluation.  Heart rate was in the 130-160 range.  Blood pressure 130 systolic.  She was afebrile sats were 90% on room air.  She received oral diltiazem 120 mg.  Also 5 mg of metoprolol IV.  She was subsequently initiated on diltiazem infusion as her rate was not adequately controlled.  She got 250 mcg of IV digoxin also.  Rate still was not controlled.  Should be noted that she had already taken her oral diltiazem 40 mg and metoprolol 50 mg orally.  Her blood pressures overnight were starting to get somewhat soft.  Therefore the diltiazem was discontinued due to being up to 15 mg an hour with much effect.  And she was started on amiodarone load IV.  Currently heart rates are improving varying between 90 and 120.  Blood pressure is better 120 230 systolic range.  Sats are still 96% on room air.  She is feeling somewhat better.    Her echocardiogram was performed this was during A. fib with RVR so may be underestimated in terms of the systolic function was low normal EF around 55%.  Did have mild to moderate mitral vegetation, moderate aortic insufficiency and mild tricuspid insufficiency.    Patient has been maintained on the oral Xarelto.  She did get a CT of her chest on  admission which showed some minor infiltrates may be a little noticeable left upper lobe anterior apical segment of right upper lobe medial segment of the middle lobe.  She has small sized right pleural effusion CT scan.  BNP was 292.  She did get 1 dose of IV Lasix with 200 cc urine output.    Current plan is to finish the IV load of amiodarone try to get her adequately rate controlled.  She may very well need something in addition to just the amiodarone give her some oral metoprolol.  The dose of Lasix is reasonable also.  She plans to continue with the current Xarelto for stroke prophylaxis.  She is awaiting some information from the drug company regarding discount for Acacian as it is currently $600 a month.  If this falls through then we will transition her over to warfarin as an outpatient.    2.  Pulmonary status: Patient has infiltrates on the CT scan with little more prominent than previously.  However really no obvious evidence of pulmonary infection.  Her procalcitonin is negative no white count no fevers at all.  No real cough or purulent sputum.  As an outpatient she has been given the amount of azithromycin by her primary care provider.  She just finished that the other day.  She was started on IV Rocephin in the ER.  Clinically really does not have any obvious evidence of pulmonary infection.  Some of this may be related to more volume issues from her A. fib with RVR.  Follow-up on this however.  She does have underlying COPD.  She does have bronchodilators available as needed.    3.  Disposition: Patient eventually will be able to return home.  Currently travels to [place] to prevention clinic for her primary care.  [place] of [place].  She is interested in seeking care little closer to home.  As she ends up coming [facility] the [facility] for her current issues.  We will set her up with a new primary care provider.  She is deciding either Dr. Head or Dr De La Cruz..  If she does require  transition to warfarin we can set that up to the Coumadin clinic.  She does have an appointment set up with Dr. Nguyen cardiology as an outpatient later March.      Diet: Low Saturated Fat Na <2400 mg  Fluids: IV lock    DVT Prophylaxis: On Xarelto  Code Status: Full Code    Disposition: Expected discharge in 1-3 days once rate controlled and symptoms inporved.    Cyril Velasquez    Interval History   Patient is feeling a bit better this morning.  She is still getting her amiodarone load.  Heart rate is better at  range blood pressure softer side.  Still oxygen.  Will give another dose of Lasix and also start oral metoprolol    -Data reviewed today: I reviewed all new labs and imaging results over the last 24 hours. I personally reviewed no images or EKG's today.    Physical Exam   Temp: 98.4  F (36.9  C) Temp src: Tympanic BP: 116/68 Pulse: 98   Resp: 12 SpO2: 95 % O2 Device: None (Room air)    Vitals:    02/22/21 1341 02/22/21 1900 02/23/21 0612   Weight: 78.9 kg (174 lb) 83.2 kg (183 lb 6.8 oz) 83.3 kg (183 lb 10.3 oz)     Vital Signs with Ranges  Temp:  [97.3  F (36.3  C)-98.6  F (37  C)] 98.4  F (36.9  C)  Pulse:  [] 98  Resp:  [9-30] 12  BP: ()/() 116/68  SpO2:  [90 %-100 %] 95 %  I/O last 3 completed shifts:  In: 926.5 [P.O.:450; I.V.:476.5]  Out: 1200 [Urine:1200]    Peripheral IV 02/22/21 Left Wrist (Active)   Site Assessment WDL 02/23/21 0800   Line Status Infusing;Checked every 1-2 hour 02/23/21 0800   Phlebitis Scale 0-->no symptoms 02/23/21 0800   Infiltration Scale 0 02/23/21 0800   Number of days: 1       Peripheral IV 02/22/21 Right Lower forearm (Active)   Site Assessment WDL 02/23/21 0800   Line Status Saline locked;Checked every 1-2 hour 02/23/21 0800   Phlebitis Scale 0-->no symptoms 02/23/21 0800   Infiltration Scale 0 02/23/21 0800   Number of days: 1     No line/device    Constitutional: Alert and oriented x3. No distress    HEENT: Normocephalic/atraumatic, PERRL, EOMI,  mouth moist, neck supple, no LN.     Cardiovascular:IRRR. no Murmur, no  rubs, or gallops.  JVD flat.  Bruits no.  Pulses 2+    Respiratory: Clear to auscultation bilaterally    Abdomen: Soft, nontender, nondistended, no organomegaly. Bowel sounds present    Extremities: Warm/dry. No edema    Neuro:   Non focal, cranial nerves intact, Moves all extremities.  Medications     amiodarone (CORDARONE) drip - ADULT 0.5 mg/min (21 0856)     dilTIAZem HCl-Sodium Chloride Stopped (21 0330)     - MEDICATION INSTRUCTIONS -         cefTRIAXone  1 g Intravenous Q24H     doxycycline hyclate  100 mg Oral Q12H SAMARIA     ipratropium - albuterol 0.5 mg/2.5 mg/3 mL  3 mL Nebulization Daily     lactobacillus rhamnosus (GG)  1 capsule Oral BID     metoprolol tartrate  50 mg Oral BID     rivaroxaban ANTICOAGULANT  20 mg Oral Daily with supper       Data   Recent Labs   Lab 21  0444 21  1412   WBC 6.4 6.7   HGB 10.8* 11.8   MCV 91 90    275    133   POTASSIUM 3.3* 3.8   CHLORIDE 100 100   CO2 27 25   BUN 16 15   CR 0.80 0.70   ANIONGAP 7 8   KORI 8.5 9.0   * 152*   ALBUMIN 2.9*  --    PROTTOTAL 6.6*  --    BILITOTAL 0.5  --    ALKPHOS 63  --    ALT 32  --    AST 20  --    TROPI <0.015 <0.015       Recent Results (from the past 24 hour(s))   Echocardiogram Complete    Narrative    [id number]  [id number]  [id number]  [id number]^TERRELL^ERICKA^AN           [facility]  Echocardiography Laboratory  [address] [address]     Name: SHARRON BELLAMY  MRN: [id number]  : 1945  Study Date: 2021 12:55 PM  Age: 75 yrs  Gender: Female  Patient Location: [facility]  Reason For Study: Atrial fibrillation with rapid ventricular response (H)  History: Afib  Ordering Physician: ERICKA TEJADA  Referring Physician: ERICKA TEJADA  Performed By: Brandi Evans RDCS     BSA: 1.9 m2  Height: 67 in  Weight: 177  lb  _____________________________________________________________________________  __        Procedure  Echocardiogram with two-dimensional, color and spectral Doppler performed. The  patient's rhythm is atrial fibrillation. Patient has atrial fibrillation with  rapid ventricular response.  _____________________________________________________________________________  __        Interpretation Summary  No pericardial effusion is present.  Borderline (EF 50-55%) reduced left ventricular function is present.  Diastolic function not assessed due to atrial fibrillation.  The right ventricle is normal size.  Mild left atrial enlargement is present.  Mild to moderate mitral insufficiency is present.  The valve leaflets are not well visualized.  Mild to moderate aortic insufficiency is present.  Mild to moderate tricuspid insufficiency is present.  Trace to mild pulmonic insufficiency is present.  The aorta root is normal.  _____________________________________________________________________________  __        Left Ventricle  Borderline (EF 50-55%) reduced left ventricular function is present. Diastolic  function not assessed due to atrial fibrillation.     Right Ventricle  The right ventricle is normal size.     Atria  Mild left atrial enlargement is present.     Mitral Valve  Mild to moderate mitral insufficiency is present.     Aortic Valve  The valve leaflets are not well visualized. Mild to moderate aortic  insufficiency is present. The mean AoV pressure gradient is 2.6 mmHg. The peak  AoV pressure gradient is 5.7 mmHg.        Tricuspid Valve  Mild to moderate tricuspid insufficiency is present.     Pulmonic Valve  Trace to mild pulmonic insufficiency is present.     Vessels  The aorta root is normal.     Pericardium  No pericardial effusion is present.     _____________________________________________________________________________  __  MMode/2D Measurements & Calculations  IVSd: 1.0 cm  LVIDd: 3.9 cm  LVIDs: 2.5  cm  LVPWd: 1.0 cm  FS: 35.7 %  LV mass(C)d: 128.9 grams  LV mass(C)dI: 67.2 grams/m2  Ao root diam: 3.0 cm  asc Aorta Diam: 3.3 cm     LVOT diam: 2.0 cm  LVOT area: 3.1 cm2  RWT: 0.54     Time Measurements  Aortic HR: 231.6 BPM  Pulm. HR: 243.0 BPM     Doppler Measurements & Calculations  MV E max eldon: 98.1 cm/sec  MV dec time: 0.12 sec  Ao V2 max: 119.6 cm/sec  Ao max P.7 mmHg  Ao V2 mean: 74.7 cm/sec  Ao mean P.6 mmHg  Ao V2 VTI: 19.4 cm  ALEX(I,D): 2.0 cm2  ALEX(V,D): 1.7 cm2  LV V1 max P.8 mmHg  LV V1 max: 67.1 cm/sec  LV V1 VTI: 12.5 cm  CO(LVOT): 8.9 l/min  CI(LVOT): 4.6 l/min/m2  SV(LVOT): 38.3 ml  SI(LVOT): 20.0 ml/m2  PA V2 max: 70.6 cm/sec  PA max P.0 mmHg  PA mean P.1 mmHg  PA V2 VTI: 12.0 cm  AV Eldon Ratio (DI): 0.56  ALEX Index (cm2/m2): 1.0  E/E' avg: 10.6     Lateral E/e': 9.0  Medial E/e': 12.2     _____________________________________________________________________________  __           Report approved by: Annemarie Saucedo 2021 04:33 PM      Chest CT w/o contrast    Narrative    CT CHEST W/O CONTRAST  2021 2:49 PM    CLINICAL HISTORY: Female, age 75 years,  Pneumonia, effusion or  abscess suspected, xray done;    Comparison:  CT scan of chest 2021    TECHNIQUE:  CT was performed of the chest  without contrast.   Sagittal, coronal, axial and MIP reconstructions were reviewed.     FINDINGS:  Chest CT:    Lungs: There is worsening consolidation now seen throughout the  anterior segment of the left upper lobe, anterior and apical segment  of the right upper lobe, medial segment of the right medial lobe and  anterior segment of the right lower lobe.    Pleura: Small left and moderate size right pleural effusions have  developed since the prior examination.    Lymph nodes: There are number of normal-sized and mildly enlarged  lymph nodes are seen throughout the mediastinum and hilar regions,  more evident on the right.    Thyroid gland is similar appearance with a  large 5 cm mass again seen  within the left lobe.    Esophagus: Moderate size hiatal hernia is similar in appearance  without acute abnormality.    Bony structures: No acute abnormality. Degenerative changes throughout  the thoracic spine are similar in appearance.      Upper abdomen: No acute abnormality.      Impression    IMPRESSION:   Worsening multifocal pneumonia with new small left and small a  moderate size right effusion and worsening mediastinal/hilar  lymphadenopathy.     Stable appearance of 11.5 x 9 mm subpleural nodule located laterally  in the left lower lobe.    IZZY ROWELL MD

## 2021-02-23 NOTE — PLAN OF CARE
Face to face report given with opportunity to observe patient.    Report given to Mara W RN Annelyse J. Stromberg, RN   2/23/2021  7:00 AM

## 2021-02-23 NOTE — PLAN OF CARE
Patient A&O. Admitted for AFIB RVR and pneumonia. Started on cardizem drip in ER. In unit, titrated drip to max dose of 15mg/hr, however, HR remained unchanged, rate 120s-150s. Attempted dose of IV digoxin and IV metoprolol per order with no change. BP became hypotensive with increasing Cardizem so pt weaned off of cardizem and started on amiodarone. Bolus given at 0154 slowly per MD instruction and then continuous drip started at 1mg/min. Pt tolerating amiodarone well. Remains in AFIB but rate has improved to 80s-120s, occasional bursts into 140s but more consistently close to goal range of . Pt denies any pain. Denies SOB. O2 sats >92% on RA. Fine crackles noted to RLL on admission, pt received IV lasix, diuresis 1L of cloudy yellow urine, lung sounds now clear bilaterally. Infrequent loose productive cough, sputum thin white, unable to obtain clean sample this shift. Started on rocephin and doxy. Pt out of bed with stand by assist of commode. Gait steady, denies dizziness. Uses call light appropriately.

## 2021-02-23 NOTE — PLAN OF CARE
Face to face report given with opportunity to observe patient.    Report given to Naz HANNON RN   2/22/2021  7:16 PM

## 2021-02-23 NOTE — PLAN OF CARE
[facility] Inpatient Admission Note:    Patient admitted to 3128/3128-1 at approximately 1849 via cart accompanied by nurse from emergency room . Report received from Cheryl LYLES RN  in SBAR format at 1815 via telephone. Patient transferred to bed via self.. Patient is alert and oriented X 3, denies pain; rates at 0 on 0-10 scale.  Patient oriented to room, unit, hourly rounding, and plan of care. Explained admission packet and patient handbook with patient bill of rights brochure. Will continue to monitor and document as needed.     Inpatient Nursing criteria listed below was met:    Health care directives status obtained and documented: No    Care Everywhere authorization obtained No    MRSA swab completed for patient 65 years and older: N/A    Patient identifies a surrogate decision maker: Yes If yes, who:Marcin Alcala  Contact Information:[phone]     If initial lactic acid >2.0, repeat lactic acid drawn within one hour of arrival to unit: NA. If no, state reason: NA      Clergy visit ordered if patient requests: N/A    Skin issues/needs documented: N/A    Isolation Patient: no Education given, correct sign in place and documentation row added to PCS:  No    Fall Prevention Yes: Care plan updated, education given and documented, sticker and magnet in place: Yes    Care Plan initiated: Yes    Education Documented (including assessment): Yes    Patient has discharge needs : NA

## 2021-02-23 NOTE — PROGRESS NOTES
Assessment completed with Oz.    LOC: alert, oriented     Dx: Atrial fibrilation with RVR  Chronic Disease Management: HTN, Af-fib    Lives with: Self  Living at:  Owns a home in [place]   Transportation: YES Drives self     Primary PCP: Telly Bardales  Insurance:  Medicare, BCBS  Medicare  letter reviewed with Kerri      Support System:  Adequate, states her two granddaughters help her a lot and visit her often.   Homecare/PCA: Denies  /County Services:   Denies  : NO     Health Care Directive: States she has been filling one out, not yet completed.   Guardian: No  POA: No    Pharmacy: [facility]   Meds management: Manages own medications, utilizes a pill planner and a medication chart.     Adequate Resources for needs (housing, utilities, food/med): YES  Household chores: Manages household on her own   Work/community/social activity: YES     ADLs: IND  Ambulation:IND  Falls: Denies      Mental health: Denies  Substance abuse: Denies  Exposure to violence/abuse: Denies      Able to Return to Prior Living Arrangements: YES    MARIANNE: Low    Plan: Return to home at tome of discharge

## 2021-02-24 LAB
ALBUMIN SERPL-MCNC: 2.7 G/DL (ref 3.4–5)
ALP SERPL-CCNC: 58 U/L (ref 40–150)
ALT SERPL W P-5'-P-CCNC: 26 U/L (ref 0–50)
ANION GAP SERPL CALCULATED.3IONS-SCNC: 7 MMOL/L (ref 3–14)
AST SERPL W P-5'-P-CCNC: 15 U/L (ref 0–45)
BACTERIA SPEC CULT: NORMAL
BILIRUB SERPL-MCNC: 0.5 MG/DL (ref 0.2–1.3)
BUN SERPL-MCNC: 11 MG/DL (ref 7–30)
CALCIUM SERPL-MCNC: 8.7 MG/DL (ref 8.5–10.1)
CHLORIDE SERPL-SCNC: 101 MMOL/L (ref 94–109)
CO2 SERPL-SCNC: 29 MMOL/L (ref 20–32)
CREAT SERPL-MCNC: 0.8 MG/DL (ref 0.52–1.04)
ERYTHROCYTE [DISTWIDTH] IN BLOOD BY AUTOMATED COUNT: 13.8 % (ref 10–15)
GFR SERPL CREATININE-BSD FRML MDRD: 71 ML/MIN/{1.73_M2}
GLUCOSE SERPL-MCNC: 136 MG/DL (ref 70–99)
HCT VFR BLD AUTO: 33.2 % (ref 35–47)
HGB BLD-MCNC: 11.3 G/DL (ref 11.7–15.7)
MAGNESIUM SERPL-MCNC: 1.4 MG/DL (ref 1.6–2.3)
MCH RBC QN AUTO: 31.2 PG (ref 26.5–33)
MCHC RBC AUTO-ENTMCNC: 34 G/DL (ref 31.5–36.5)
MCV RBC AUTO: 92 FL (ref 78–100)
PLATELET # BLD AUTO: 218 10E9/L (ref 150–450)
POTASSIUM SERPL-SCNC: 3.1 MMOL/L (ref 3.4–5.3)
PROT SERPL-MCNC: 6.5 G/DL (ref 6.8–8.8)
RBC # BLD AUTO: 3.62 10E12/L (ref 3.8–5.2)
SODIUM SERPL-SCNC: 137 MMOL/L (ref 133–144)
SPECIMEN SOURCE: NORMAL
WBC # BLD AUTO: 5.4 10E9/L (ref 4–11)

## 2021-02-24 PROCEDURE — 250N000009 HC RX 250: Performed by: INTERNAL MEDICINE

## 2021-02-24 PROCEDURE — 94640 AIRWAY INHALATION TREATMENT: CPT | Mod: 76

## 2021-02-24 PROCEDURE — 94640 AIRWAY INHALATION TREATMENT: CPT

## 2021-02-24 PROCEDURE — 85027 COMPLETE CBC AUTOMATED: CPT | Performed by: INTERNAL MEDICINE

## 2021-02-24 PROCEDURE — 250N000011 HC RX IP 250 OP 636: Performed by: INTERNAL MEDICINE

## 2021-02-24 PROCEDURE — 99233 SBSQ HOSP IP/OBS HIGH 50: CPT | Performed by: INTERNAL MEDICINE

## 2021-02-24 PROCEDURE — 36415 COLL VENOUS BLD VENIPUNCTURE: CPT | Performed by: INTERNAL MEDICINE

## 2021-02-24 PROCEDURE — 80053 COMPREHEN METABOLIC PANEL: CPT | Performed by: INTERNAL MEDICINE

## 2021-02-24 PROCEDURE — 200N000001 HC R&B ICU

## 2021-02-24 PROCEDURE — 250N000013 HC RX MED GY IP 250 OP 250 PS 637: Performed by: INTERNAL MEDICINE

## 2021-02-24 PROCEDURE — 83735 ASSAY OF MAGNESIUM: CPT | Performed by: INTERNAL MEDICINE

## 2021-02-24 PROCEDURE — 999N000157 HC STATISTIC RCP TIME EA 10 MIN

## 2021-02-24 RX ORDER — DIGOXIN 0.25 MG/ML
500 INJECTION INTRAMUSCULAR; INTRAVENOUS ONCE
Status: DISCONTINUED | OUTPATIENT
Start: 2021-02-24 | End: 2021-02-24

## 2021-02-24 RX ORDER — POTASSIUM CHLORIDE 750 MG/1
40 CAPSULE, EXTENDED RELEASE ORAL 3 TIMES DAILY
Status: DISCONTINUED | OUTPATIENT
Start: 2021-02-24 | End: 2021-02-25

## 2021-02-24 RX ORDER — METOPROLOL TARTRATE 1 MG/ML
5 INJECTION, SOLUTION INTRAVENOUS
Status: DISPENSED | OUTPATIENT
Start: 2021-02-24 | End: 2021-02-24

## 2021-02-24 RX ORDER — CALCIUM CARBONATE 500 MG/1
500 TABLET, CHEWABLE ORAL DAILY PRN
Status: DISCONTINUED | OUTPATIENT
Start: 2021-02-24 | End: 2021-02-25

## 2021-02-24 RX ORDER — AMIODARONE HYDROCHLORIDE 200 MG/1
200 TABLET ORAL 2 TIMES DAILY
Status: DISCONTINUED | OUTPATIENT
Start: 2021-02-24 | End: 2021-02-26 | Stop reason: HOSPADM

## 2021-02-24 RX ORDER — METOPROLOL TARTRATE 1 MG/ML
5 INJECTION, SOLUTION INTRAVENOUS ONCE
Status: COMPLETED | OUTPATIENT
Start: 2021-02-24 | End: 2021-02-24

## 2021-02-24 RX ORDER — DIGOXIN 0.25 MG/ML
250 INJECTION INTRAMUSCULAR; INTRAVENOUS EVERY 6 HOURS
Status: COMPLETED | OUTPATIENT
Start: 2021-02-24 | End: 2021-02-24

## 2021-02-24 RX ADMIN — DIGOXIN 250 MCG: 0.25 INJECTION INTRAMUSCULAR; INTRAVENOUS at 11:40

## 2021-02-24 RX ADMIN — DIGOXIN 250 MCG: 0.25 INJECTION INTRAMUSCULAR; INTRAVENOUS at 18:08

## 2021-02-24 RX ADMIN — Medication 1 CAPSULE: at 20:18

## 2021-02-24 RX ADMIN — POTASSIUM CHLORIDE 40 MEQ: 750 CAPSULE, EXTENDED RELEASE ORAL at 08:41

## 2021-02-24 RX ADMIN — METOPROLOL TARTRATE 5 MG: 5 INJECTION INTRAVENOUS at 07:05

## 2021-02-24 RX ADMIN — ALBUTEROL SULFATE 2.5 MG: 2.5 SOLUTION RESPIRATORY (INHALATION) at 21:54

## 2021-02-24 RX ADMIN — AMIODARONE HYDROCHLORIDE 200 MG: 200 TABLET ORAL at 20:18

## 2021-02-24 RX ADMIN — ALBUTEROL SULFATE 2.5 MG: 2.5 SOLUTION RESPIRATORY (INHALATION) at 00:36

## 2021-02-24 RX ADMIN — Medication 1 CAPSULE: at 08:41

## 2021-02-24 RX ADMIN — METOPROLOL TARTRATE 50 MG: 50 TABLET, FILM COATED ORAL at 20:18

## 2021-02-24 RX ADMIN — AMIODARONE HYDROCHLORIDE 200 MG: 200 TABLET ORAL at 09:40

## 2021-02-24 RX ADMIN — DIGOXIN 250 MCG: 0.25 INJECTION INTRAMUSCULAR; INTRAVENOUS at 23:48

## 2021-02-24 RX ADMIN — POTASSIUM CHLORIDE 40 MEQ: 750 CAPSULE, EXTENDED RELEASE ORAL at 14:06

## 2021-02-24 RX ADMIN — METOPROLOL TARTRATE 50 MG: 50 TABLET, FILM COATED ORAL at 08:41

## 2021-02-24 RX ADMIN — METOPROLOL TARTRATE 5 MG: 5 INJECTION INTRAVENOUS at 07:46

## 2021-02-24 RX ADMIN — IPRATROPIUM BROMIDE AND ALBUTEROL SULFATE 3 ML: .5; 3 SOLUTION RESPIRATORY (INHALATION) at 07:34

## 2021-02-24 RX ADMIN — POTASSIUM CHLORIDE 40 MEQ: 750 CAPSULE, EXTENDED RELEASE ORAL at 20:18

## 2021-02-24 RX ADMIN — RIVAROXABAN 20 MG: 20 TABLET, FILM COATED ORAL at 18:08

## 2021-02-24 ASSESSMENT — ACTIVITIES OF DAILY LIVING (ADL)
ADLS_ACUITY_SCORE: 15
ADLS_ACUITY_SCORE: 16
ADLS_ACUITY_SCORE: 15
ADLS_ACUITY_SCORE: 16
ADLS_ACUITY_SCORE: 16
ADLS_ACUITY_SCORE: 15

## 2021-02-24 ASSESSMENT — MIFFLIN-ST. JEOR: SCORE: 1347.63

## 2021-02-24 NOTE — PLAN OF CARE
"Pt remains in A-Fib, rate is labile. Pt rec'd IV lopressor in beginning of shift for -150's with little to no change in rate. Scheduled PO Toprol administered with again little change to HR. Scheduled PO amiodarone administered this morning as well. HR remained 110-140's. IV digoxin administered with some improvement in rate, currently 's with occasional brief increases into the 130's-140's. Scheduled potassium administered. Dr. Velasquez updated of magnesium level. Pt nervous to \"exert herself,\" does feel generally weak and needs encouragement to get up and out of bed. Gets up with SBA. Buttocks are reddened and pea-sized open area is noted to L side of inner gluteal cleft. Cream applied frequently t/o day.  Remains A&O. Calls appropriately. Good appetite. Voiding adequate amounts. Call light within reach.     1830: Up in hallway with SBA. -150's during activity. Denies any feelings of dizziness or SOB.     Face to face report given with opportunity to observe patient.    Report given to MICHAEL Nagy RN   2/24/2021  6:53 PM          "

## 2021-02-24 NOTE — PLAN OF CARE
HR remains unchanged after administration of IV lopressor x2, remains A-Fib 110-150's. MD Updated.

## 2021-02-24 NOTE — PROGRESS NOTES
Patient did not have any significant response to couple doses of IV metoprolol this morning.  Rate still was between 100-120 at rest.  No opted on trying IV digoxin.  We will try and get her loaded up over the next 24 hours.  If we get her rate controlled then think will be okay and looking at discharge.  I do want her to get up and ambulate however she has not really done that for the last couple of days.  She has been somewhat resistant.  Discussed this with the nursing staff will put her on telemetry ambulate this evening.  Patient herself is not having any significant dyspnea.  Sats have been fine.    If rate uncontrolled still then will call Cardiology regarding options.

## 2021-02-24 NOTE — PLAN OF CARE
"Patient remains A&O. Poor sleep, tearful this AM, expressed fears and frustration with her \"stubborn heart.\" Education and emotional support provided.     Amiodarone drip completed at 0300. Continues to receive BID Metorpolol BID. Remains in AFIB with increasingly poor rate control through out night, rate now 100s-140s. Even with amiodarone infusing, pt's HR increasing up into 140s. MD updated. BP remains stable. RR 20s, pt reported episode of SOB this shift, stated \"I just feel like I can't take in a deep enough breath.\" HR at that time 80s-120. Lung sounds remain unchanged, clear bilaterally, diminished in bases. O2 sats >92% on RA. Pt received albuterol neb treatment with relief of SOB per pt report. Infrequent dry cough. Denies any pain. Out of bed with stand by assist and tolerates well. Pt uses call light and makes needs known.  "

## 2021-02-24 NOTE — PLAN OF CARE
Pt is alert and oriented. Remains on amiodarone gtt with heart rate 80-120s. Started on oral metoprolol BID. Pt tolerating well with BP stable. Afebrile. Remains on room air with clear lung sounds. Denies pain. Up to bedside commode multiple times throughout the day. Small scratch noted to left thigh. Bleeding this afternoon. Covered with gauze, scabbed over and gauze removed. Pt call light appropriate and makes needs known.     Face to face report given with opportunity to observe patient.    Report given to MICHAEL Paniagua RN   2/23/2021  7:15 PM

## 2021-02-24 NOTE — PLAN OF CARE
Face to face report given with opportunity to observe patient.    Report given to Sarah P RN Annelyse J. Stromberg, RN   2/24/2021  7:22 AM

## 2021-02-24 NOTE — PROGRESS NOTES
[facility]    Hospitalist Progress Note      Assessment & Plan   Kerri Gonsales is a 75 year old female who was admitted on 2/22/2021.      1.  Atrial fibrillation with rapid ventricular response: Patient initially presented back on 2/12/2021 with several days of some palpitations dyspnea cough.  Was found to be in A. fib with RVR.  No prior history of documentation of this.  She was started on Xarelto.  Her rate was relatively well controlled on oral Cardizem and metoprolol.  Discharged home.  For the next several days continued to have some palpitations dyspnea.  It actually got significantly worse and presented to the ER again on 2/22/2021.  She was once again in A. fib with RVR despite taking the Cardizem  mg 50 mg of metoprolol.  He was given a couple more doses of oral Cardizem 120 mg total.  Dose of IV metoprolol dose of IV dig.  And then placed on a Cardizem infusion.  Despite 15 mg an hour pressure started to become somewhat soft and rate was not adequately controlled.  Therefore she was started on amiodarone and was given a IV loading dose.  Currently she still remains in A. fib with variable heart rate anywhere from 98 up to 130.  Blood pressure stable.  No significant dyspnea O2 sats remained stable on room air.  She remains on Xarelto.    Her echocardiogram done excellent information was a little difficult given her RVR but left ventricle had low normal systolic function EF around 50 to 55%.  Slight left atrial enlargement.  Mild to moderate aortic insufficiency, mild to moderate mitral and tricuspid insufficiency.  Right ventricle appeared to be normal.    So at this point still not achieved good rate control.  There are some oral metoprolol yesterday 50 mg she got 2 doses with no obvious effect.  We will do this morning is give her 2 IV doses see if we can get some response before increasing the dose of metoprolol.  Unable to get her adequately controlled then may need to consider  further cardiac evaluation with transesophageal echo and possible cardioversion    2.  Pulmonary status: O2 sats remained very good.  Her lung exam is clear.  There was some question of a possible pneumonia but clinically there is no evidence of this.  I discontinued her antibiotics at this time.  There is also a left lower lobe nodule that will need to be followed up also.    3.  GI status: Patient is having some loose stools which I think is due to her antibiotic therapy.  She is on Probiotics.    4.  Goiter: Patient does have a very large goiter.  TSH is within normal range at 1.61.  This will need to be evaluated down the line.    5.  Hypokalemia: Down to 3.1.  We will give her oral replacement and recheck.    6.  Activity: We will get her up in chair actually ambulate today.  Do need to see how rate is with activity.      Diet: Low Saturated Fat Na <2400 mg  Fluids: IV lock    DVT Prophylaxis: xarelto  Code Status: Full Code    Disposition: Expected discharge  When rate is adequately controlled    [facility]    Interval History   Patient is alert appropriate.  Still little frustrated.  Heart rate still is not adequately controlled.  Heart rate  varies between 100-130.  No real dyspnea at all.  No chest pains.  Some loose stools.  Have discontinued the antibiotics.  She has had no fevers no evidence of clinical pneumonia.  Has finished the amiodarone load.  We will try to increase the beta-blocker to see if we can get adequate control.  Replace her potassium.  Ambulate.  Did discuss to possible options of cardioversion.    -Data reviewed today: I reviewed all new labs and imaging results over the last 24 hours. I personally reviewed no images or EKG's today.    Physical Exam   Temp: 97.9  F (36.6  C) Temp src: Tympanic BP: 112/62 Pulse: (!) 141   Resp: 24 SpO2: 94 % O2 Device: None (Room air)    Vitals:    02/22/21 1900 02/23/21 0612 02/24/21 0500   Weight: 83.2 kg (183 lb 6.8 oz) 83.3 kg (183 lb 10.3 oz)  83 kg (182 lb 15.7 oz)     Vital Signs with Ranges  Temp:  [97.3  F (36.3  C)-98.4  F (36.9  C)] 97.9  F (36.6  C)  Pulse:  [] 141  Resp:  [11-32] 24  BP: ()/(54-99) 112/62  SpO2:  [74 %-97 %] 94 %  I/O last 3 completed shifts:  In: 13486.5 [P.O.:92287; I.V.:426.5]  Out: 1300 [Urine:1300]    Peripheral IV 02/22/21 Left Wrist (Active)   Site Assessment WDL 02/24/21 0300   Line Status Saline locked;Checked every 1-2 hour 02/24/21 0300   Phlebitis Scale 0-->no symptoms 02/24/21 0300   Infiltration Scale 0 02/24/21 0300   Number of days: 2       Peripheral IV 02/22/21 Right Lower forearm (Active)   Site Assessment L 02/24/21 0300   Line Status Saline locked;Checked every 1-2 hour 02/24/21 0300   Phlebitis Scale 0-->no symptoms 02/24/21 0300   Infiltration Scale 0 02/24/21 0300   Number of days: 2     No line/device    Constitutional: Alert and oriented x3. No distress    HEENT: Normocephalic/atraumatic, PERRL, EOMI, mouth moist, neck supple, no LN.     Cardiovascular: Irregularly irregular tachycardic no murmur, no  rubs, or gallops.  JVD flat.  Bruits no.  Pulses 2+    Respiratory: Clear to auscultation bilaterally    Abdomen: Soft, nontender, nondistended, no organomegaly. Bowel sounds present    Extremities: Warm/dry.  No edema    Neuro:   Non focal, cranial nerves intact, Moves all extremities.  Medications     amiodarone (CORDARONE) drip - ADULT Stopped (02/24/21 0257)     dilTIAZem HCl-Sodium Chloride Stopped (02/23/21 0330)     - MEDICATION INSTRUCTIONS -         ipratropium - albuterol 0.5 mg/2.5 mg/3 mL  3 mL Nebulization Daily     lactobacillus rhamnosus (GG)  1 capsule Oral BID     metoprolol  5 mg Intravenous Q30 Min     metoprolol tartrate  50 mg Oral BID     potassium chloride ER  40 mEq Oral TID     rivaroxaban ANTICOAGULANT  20 mg Oral Daily with supper       Data   Recent Labs   Lab 02/24/21  0451 02/23/21  0444 02/22/21  1412   WBC 5.4 6.4 6.7   HGB 11.3* 10.8* 11.8   MCV 92 91 90   PLT  218 206 275    134 133   POTASSIUM 3.1* 3.3* 3.8   CHLORIDE 101 100 100   CO2 29 27 25   BUN 11 16 15   CR 0.80 0.80 0.70   ANIONGAP 7 7 8   KORI 8.7 8.5 9.0   * 159* 152*   ALBUMIN 2.7* 2.9*  --    PROTTOTAL 6.5* 6.6*  --    BILITOTAL 0.5 0.5  --    ALKPHOS 58 63  --    ALT 26 32  --    AST 15 20  --    TROPI  --  <0.015 <0.015       No results found for this or any previous visit (from the past 24 hour(s)).

## 2021-02-25 ENCOUNTER — TELEPHONE (OUTPATIENT)
Dept: CARDIOLOGY | Facility: OTHER | Age: 76
End: 2021-02-25

## 2021-02-25 LAB
ALBUMIN SERPL-MCNC: 2.6 G/DL (ref 3.4–5)
ALP SERPL-CCNC: 57 U/L (ref 40–150)
ALT SERPL W P-5'-P-CCNC: 23 U/L (ref 0–50)
ANION GAP SERPL CALCULATED.3IONS-SCNC: 1 MMOL/L (ref 3–14)
AST SERPL W P-5'-P-CCNC: 15 U/L (ref 0–45)
BILIRUB SERPL-MCNC: 0.5 MG/DL (ref 0.2–1.3)
BUN SERPL-MCNC: 11 MG/DL (ref 7–30)
CALCIUM SERPL-MCNC: 8.7 MG/DL (ref 8.5–10.1)
CHLORIDE SERPL-SCNC: 107 MMOL/L (ref 94–109)
CO2 SERPL-SCNC: 30 MMOL/L (ref 20–32)
CREAT SERPL-MCNC: 0.84 MG/DL (ref 0.52–1.04)
DIGOXIN SERPL-MCNC: 1.8 UG/L (ref 0.5–2)
ERYTHROCYTE [DISTWIDTH] IN BLOOD BY AUTOMATED COUNT: 14.1 % (ref 10–15)
GFR SERPL CREATININE-BSD FRML MDRD: 66 ML/MIN/{1.73_M2}
GLUCOSE SERPL-MCNC: 114 MG/DL (ref 70–99)
HCT VFR BLD AUTO: 34.2 % (ref 35–47)
HGB BLD-MCNC: 11.4 G/DL (ref 11.7–15.7)
MCH RBC QN AUTO: 31.2 PG (ref 26.5–33)
MCHC RBC AUTO-ENTMCNC: 33.3 G/DL (ref 31.5–36.5)
MCV RBC AUTO: 94 FL (ref 78–100)
PLATELET # BLD AUTO: 234 10E9/L (ref 150–450)
POTASSIUM SERPL-SCNC: 4.5 MMOL/L (ref 3.4–5.3)
PROT SERPL-MCNC: 6.2 G/DL (ref 6.8–8.8)
RBC # BLD AUTO: 3.65 10E12/L (ref 3.8–5.2)
SODIUM SERPL-SCNC: 138 MMOL/L (ref 133–144)
WBC # BLD AUTO: 5.4 10E9/L (ref 4–11)

## 2021-02-25 PROCEDURE — 250N000013 HC RX MED GY IP 250 OP 250 PS 637: Performed by: INTERNAL MEDICINE

## 2021-02-25 PROCEDURE — 250N000011 HC RX IP 250 OP 636: Performed by: INTERNAL MEDICINE

## 2021-02-25 PROCEDURE — 80053 COMPREHEN METABOLIC PANEL: CPT | Performed by: INTERNAL MEDICINE

## 2021-02-25 PROCEDURE — 94640 AIRWAY INHALATION TREATMENT: CPT

## 2021-02-25 PROCEDURE — 80162 ASSAY OF DIGOXIN TOTAL: CPT | Performed by: INTERNAL MEDICINE

## 2021-02-25 PROCEDURE — 93005 ELECTROCARDIOGRAM TRACING: CPT

## 2021-02-25 PROCEDURE — 99232 SBSQ HOSP IP/OBS MODERATE 35: CPT | Performed by: INTERNAL MEDICINE

## 2021-02-25 PROCEDURE — 120N000001 HC R&B MED SURG/OB

## 2021-02-25 PROCEDURE — 85027 COMPLETE CBC AUTOMATED: CPT | Performed by: INTERNAL MEDICINE

## 2021-02-25 PROCEDURE — 36415 COLL VENOUS BLD VENIPUNCTURE: CPT | Performed by: INTERNAL MEDICINE

## 2021-02-25 PROCEDURE — 999N000157 HC STATISTIC RCP TIME EA 10 MIN

## 2021-02-25 PROCEDURE — 250N000009 HC RX 250: Performed by: INTERNAL MEDICINE

## 2021-02-25 PROCEDURE — 94640 AIRWAY INHALATION TREATMENT: CPT | Mod: 76

## 2021-02-25 RX ORDER — POTASSIUM CHLORIDE 750 MG/1
40 CAPSULE, EXTENDED RELEASE ORAL DAILY
Status: DISCONTINUED | OUTPATIENT
Start: 2021-02-26 | End: 2021-02-26 | Stop reason: HOSPADM

## 2021-02-25 RX ORDER — MAGNESIUM SULFATE HEPTAHYDRATE 40 MG/ML
INJECTION, SOLUTION INTRAVENOUS
Status: DISCONTINUED
Start: 2021-02-25 | End: 2021-02-25 | Stop reason: HOSPADM

## 2021-02-25 RX ORDER — DIGOXIN 125 MCG
125 TABLET ORAL DAILY
Status: DISCONTINUED | OUTPATIENT
Start: 2021-02-25 | End: 2021-02-26

## 2021-02-25 RX ORDER — MAGNESIUM SULFATE HEPTAHYDRATE 40 MG/ML
4 INJECTION, SOLUTION INTRAVENOUS ONCE
Status: COMPLETED | OUTPATIENT
Start: 2021-02-25 | End: 2021-02-25

## 2021-02-25 RX ORDER — MAGNESIUM SULFATE HEPTAHYDRATE 40 MG/ML
2 INJECTION, SOLUTION INTRAVENOUS ONCE
Status: DISCONTINUED | OUTPATIENT
Start: 2021-02-25 | End: 2021-02-25

## 2021-02-25 RX ADMIN — METOPROLOL TARTRATE 50 MG: 50 TABLET, FILM COATED ORAL at 20:28

## 2021-02-25 RX ADMIN — AMIODARONE HYDROCHLORIDE 200 MG: 200 TABLET ORAL at 20:28

## 2021-02-25 RX ADMIN — RIVAROXABAN 20 MG: 20 TABLET, FILM COATED ORAL at 18:45

## 2021-02-25 RX ADMIN — DIGOXIN 125 MCG: 125 TABLET ORAL at 10:00

## 2021-02-25 RX ADMIN — Medication 1 CAPSULE: at 20:28

## 2021-02-25 RX ADMIN — IPRATROPIUM BROMIDE AND ALBUTEROL SULFATE 3 ML: .5; 3 SOLUTION RESPIRATORY (INHALATION) at 06:25

## 2021-02-25 RX ADMIN — ALBUTEROL SULFATE 2.5 MG: 2.5 SOLUTION RESPIRATORY (INHALATION) at 20:45

## 2021-02-25 RX ADMIN — POTASSIUM CHLORIDE 40 MEQ: 750 CAPSULE, EXTENDED RELEASE ORAL at 08:47

## 2021-02-25 RX ADMIN — MAGNESIUM SULFATE IN WATER 4 G: 40 INJECTION, SOLUTION INTRAVENOUS at 06:43

## 2021-02-25 RX ADMIN — AMIODARONE HYDROCHLORIDE 200 MG: 200 TABLET ORAL at 08:48

## 2021-02-25 RX ADMIN — Medication 1 CAPSULE: at 08:48

## 2021-02-25 RX ADMIN — METOPROLOL TARTRATE 50 MG: 50 TABLET, FILM COATED ORAL at 08:48

## 2021-02-25 ASSESSMENT — ACTIVITIES OF DAILY LIVING (ADL)
ADLS_ACUITY_SCORE: 17
ADLS_ACUITY_SCORE: 15
ADLS_ACUITY_SCORE: 15
ADLS_ACUITY_SCORE: 17
ADLS_ACUITY_SCORE: 17
ADLS_ACUITY_SCORE: 15

## 2021-02-25 ASSESSMENT — MIFFLIN-ST. JEOR: SCORE: 1348.63

## 2021-02-25 NOTE — PLAN OF CARE
"/79 (BP Location: Left arm)   Pulse 103   Temp 97.8  F (36.6  C) (Tympanic)   Resp 16   Ht 1.702 m (5' 7\")   Wt 83 kg (182 lb 15.7 oz)   SpO2 95%   BMI 28.66 kg/m      Pt alert and oriented upon initial assessment. Denies pain. Afib 70-120s. However mainly 90-110s. Will jump up to 140s briefly with transferring, does not sustain. States feels more reserve with transfers. SBP 140s. Mag 1.4 2/24, replaced 4g this AM.  1x. K+ upto 4.5 after scheduled replacements.  Lungs clear, Satting 97% RA. BS active, loose stools continue but are slowing per pt. Slept on and off t/o night. No falls or injuries this shift. Will continue to monitor.     Face to face report given with opportunity to observe patient.    Report given to MICHAEL Fox RN   2/25/2021  7:02 AM      "

## 2021-02-25 NOTE — TELEPHONE ENCOUNTER
Patient will be discharging from hospital tomorrow, is on a lot of cardiac medication and nurse is wondering if she can get in any sooner than her appt on March 31st.  Please call at [phone].  Can leave message if no answer.

## 2021-02-25 NOTE — PROGRESS NOTES
Patient actually continues to do very well.  Heart rate is nicely controlled even with ambulation.  She will be stepped on at this point.  Ambulate multiple times throughout the day monitor heart rate to make sure she does not become bradycardic.  But if all continues as is we will tentatively plan on discharge tomorrow.  Check digoxin level tomorrow.

## 2021-02-25 NOTE — PLAN OF CARE
Pt up ambulating in hallway. HR remained 's for entire walk. Remains in A-Fib/Flutter. Pt denied any feelings of dizziness, lightheadedness, or SOB.

## 2021-02-25 NOTE — PROGRESS NOTES
[facility]    Hospitalist Progress Note      Assessment & Plan   Kerri Gonsales is a 75 year old female who was admitted on 2/22/2021.      1.  Atrial fibrillation/atrial flutter with rapid ventricular response: Patient initially presented back on 2/12/2021 with several days of some palpitations dyspnea cough.  Was found to be in A. fib with RVR.  No prior history of documentation of this.  She was started on Xarelto.  Her rate was relatively well controlled on oral Cardizem and metoprolol.  Look in the chart it was noted to be still 100-120 at rest. Discharged home.  For the next several days continued to have some palpitations and some dyspnea.  It actually got significantly worse and presented to the ER again on 2/22/2021.  She was once again in A. fib with RVR rates 120-150 despite taking the Cardizem  mg 50 mg of metoprolol.  He was given a couple more doses of oral Cardizem 120 mg total.    5 mg dose of IV metoprolol dose of 0.25 mg IV dig.  And then placed on a Cardizem infusion 15 mg an hour, pressure started to become somewhat soft and rate was not adequately controlled.  Therefore she was started on amiodarone with the standard IV loading regimen over 24 hours..      Patient has continued to remain in atrial fibrillation.  Yesterday rate still at rest 100-120 range..  With getting up to the commode she would jump up to 140.  Blood pressure stable.  I did decide to give her some IV digoxin.  She has gotten 0.75 mg total dose.  Her heart rate still is not consistently less than 100.  She still is  at times..  She did ambulate last night and felt well.  Repeated EKG today and she does show atrial flutter currently.  She may have been in and out of the flutter throughout this hospital stay also.    She has been maintained on oral Xarelto.    Her echocardiogram was a little difficult given her RVR but left ventricle had low normal systolic function EF around 50 to 55%.  Slight left  atrial enlargement.  Mild to moderate aortic insufficiency, mild to moderate mitral and tricuspid insufficiency.  Right ventricle appeared to be normal.     At this point she is relatively asymptomatic.  Rate is not as good as I like to see it despite being on a lot of chronotropic agents including amiodarone.  Get her up walking today this morning see how she does she is symptomatic what her rate  does.  Then will discuss it further with electrophysiologist today.    2.  Pulmonary status: O2 sats remained stable.  Patient does get an occasional albuterol neb.  No significant wheezing currently.  No signs of obvious volume overload.  No signs of pulmonary infection.    3.  GI status: Some heartburn.  Tums.  Looser stools are improving.    4.  Goiter: Thyroid function remains normal.    5.  Hypokalemia/hypomagnesemia: Replacing magnesium today.  Potassium stable.  Diet: Low Saturated Fat Na <2400 mg  Fluids: none    DVT Prophylaxis: Xarelto  Code Status: Full Code    Disposition: Expected discharge when rate adequately controlled    [facility]    Interval History   Patient alert this morning pleasant no distress.  Slept well.  No chest pains no obvious shortness of breath.  Did get a B12 level this morning.  Abdomen is fine.  Bowels are not as loose as previously.  Heart rate despite the oral metoprolol digoxin and amiodarone still is not consistently 100 or less.  Discussed with her we will plan on ambulation this morning continue with those 3 current medications.  EKG does show a flutter.  Will discuss further with cardiology regarding further options.  Or if her rate is distant she is able to ambulate asymptomatic colitis will be able to discharge her with this regimen.    -Data reviewed today: I reviewed all new labs and imaging results over the last 24 hours. I personally reviewed no images or EKG's today.    Physical Exam   Temp: 97.8  F (36.6  C) Temp src: Tympanic BP: 133/79 Pulse: 103   Resp: 16 SpO2:  95 % O2 Device: None (Room air)    Vitals:    02/23/21 0612 02/24/21 0500 02/25/21 0500   Weight: 83.3 kg (183 lb 10.3 oz) 83 kg (182 lb 15.7 oz) 83.1 kg (183 lb 3.2 oz)     Vital Signs with Ranges  Temp:  [97.6  F (36.4  C)-98.9  F (37.2  C)] 97.8  F (36.6  C)  Pulse:  [] 103  Resp:  [15-26] 16  BP: ()/() 133/79  SpO2:  [91 %-98 %] 95 %  I/O last 3 completed shifts:  In: 1150 [P.O.:1150]  Out: 1000 [Urine:1000]    Peripheral IV 02/22/21 Left Wrist (Active)   Site Assessment WDL 02/25/21 0400   Line Status Saline locked 02/25/21 0400   Phlebitis Scale 0-->no symptoms 02/25/21 0400   Infiltration Scale 0 02/25/21 0400   Number of days: 3       Peripheral IV 02/22/21 Right Lower forearm (Active)   Site Assessment WDL 02/25/21 0400   Line Status Saline locked 02/25/21 0400   Phlebitis Scale 0-->no symptoms 02/25/21 0400   Infiltration Scale 0 02/25/21 0400   Number of days: 3       Wound Buttocks (Active)   Wound Bed Pink;Red 02/25/21 0500   Sirena-wound Assessment Erythema 02/25/21 0500   Drainage Amount None 02/25/21 0500   Wound Care/Cleansing Barrier applied  02/25/21 0500   Number of days: 1     No line/device    Constitutional: Alert and oriented x3. No distress    HEENT: Normocephalic/atraumatic, PERRL, EOMI, mouth moist, neck supple, no LN.     Cardiovascular:IRRR. no Murmur, no  rubs, or gallops.  JVD no.  Bruits no.  Pulses 2+    Respiratory: Clear to auscultation bilaterally    Abdomen: Soft, nontender, nondistended, no organomegaly. Bowel sounds present    Extremities: Warm/dry. No edema    Neuro:   Non focal, cranial nerves intact, Moves all extremities.  Medications     - MEDICATION INSTRUCTIONS -         amiodarone  200 mg Oral BID     digoxin  125 mcg Oral Daily     ipratropium - albuterol 0.5 mg/2.5 mg/3 mL  3 mL Nebulization Daily     lactobacillus rhamnosus (GG)  1 capsule Oral BID     magnesium sulfate         metoprolol tartrate  50 mg Oral BID     potassium chloride ER  40 mEq Oral  TID     rivaroxaban ANTICOAGULANT  20 mg Oral Daily with supper       Data   Recent Labs   Lab 02/25/21  0442 02/24/21  0451 02/23/21  0444 02/22/21  1412 02/22/21  1412   WBC 5.4 5.4 6.4  --  6.7   HGB 11.4* 11.3* 10.8*  --  11.8   MCV 94 92 91  --  90    218 206  --  275    137 134  --  133   POTASSIUM 4.5 3.1* 3.3*  --  3.8   CHLORIDE 107 101 100  --  100   CO2 30 29 27  --  25   BUN 11 11 16  --  15   CR 0.84 0.80 0.80  --  0.70   ANIONGAP 1* 7 7  --  8   KORI 8.7 8.7 8.5  --  9.0   * 136* 159*  --  152*   ALBUMIN 2.6* 2.7* 2.9*   < >  --    PROTTOTAL 6.2* 6.5* 6.6*   < >  --    BILITOTAL 0.5 0.5 0.5   < >  --    ALKPHOS 57 58 63   < >  --    ALT 23 26 32   < >  --    AST 15 15 20   < >  --    TROPI  --   --  <0.015  --  <0.015    < > = values in this interval not displayed.       No results found for this or any previous visit (from the past 24 hour(s)).

## 2021-02-26 VITALS
TEMPERATURE: 98.1 F | HEART RATE: 122 BPM | BODY MASS INDEX: 29.03 KG/M2 | SYSTOLIC BLOOD PRESSURE: 156 MMHG | OXYGEN SATURATION: 96 % | RESPIRATION RATE: 20 BRPM | HEIGHT: 67 IN | DIASTOLIC BLOOD PRESSURE: 68 MMHG | WEIGHT: 184.97 LBS

## 2021-02-26 LAB
ALBUMIN SERPL-MCNC: 3 G/DL (ref 3.4–5)
ALP SERPL-CCNC: 60 U/L (ref 40–150)
ALT SERPL W P-5'-P-CCNC: 23 U/L (ref 0–50)
ANION GAP SERPL CALCULATED.3IONS-SCNC: 4 MMOL/L (ref 3–14)
AST SERPL W P-5'-P-CCNC: 13 U/L (ref 0–45)
BILIRUB SERPL-MCNC: 0.5 MG/DL (ref 0.2–1.3)
BUN SERPL-MCNC: 13 MG/DL (ref 7–30)
CALCIUM SERPL-MCNC: 8.9 MG/DL (ref 8.5–10.1)
CHLORIDE SERPL-SCNC: 106 MMOL/L (ref 94–109)
CO2 SERPL-SCNC: 26 MMOL/L (ref 20–32)
CREAT SERPL-MCNC: 0.74 MG/DL (ref 0.52–1.04)
DIGOXIN SERPL-MCNC: 1.4 UG/L (ref 0.5–2)
ERYTHROCYTE [DISTWIDTH] IN BLOOD BY AUTOMATED COUNT: 14 % (ref 10–15)
GFR SERPL CREATININE-BSD FRML MDRD: 78 ML/MIN/{1.73_M2}
GLUCOSE SERPL-MCNC: 127 MG/DL (ref 70–99)
HCT VFR BLD AUTO: 36.7 % (ref 35–47)
HGB BLD-MCNC: 12.1 G/DL (ref 11.7–15.7)
MAGNESIUM SERPL-MCNC: 1.9 MG/DL (ref 1.6–2.3)
MCH RBC QN AUTO: 30.6 PG (ref 26.5–33)
MCHC RBC AUTO-ENTMCNC: 33 G/DL (ref 31.5–36.5)
MCV RBC AUTO: 93 FL (ref 78–100)
PLATELET # BLD AUTO: 265 10E9/L (ref 150–450)
POTASSIUM SERPL-SCNC: 4.5 MMOL/L (ref 3.4–5.3)
PROT SERPL-MCNC: 6.9 G/DL (ref 6.8–8.8)
RBC # BLD AUTO: 3.96 10E12/L (ref 3.8–5.2)
SODIUM SERPL-SCNC: 136 MMOL/L (ref 133–144)
WBC # BLD AUTO: 6.4 10E9/L (ref 4–11)

## 2021-02-26 PROCEDURE — 250N000013 HC RX MED GY IP 250 OP 250 PS 637: Performed by: INTERNAL MEDICINE

## 2021-02-26 PROCEDURE — 85027 COMPLETE CBC AUTOMATED: CPT | Performed by: INTERNAL MEDICINE

## 2021-02-26 PROCEDURE — 99239 HOSP IP/OBS DSCHRG MGMT >30: CPT | Performed by: INTERNAL MEDICINE

## 2021-02-26 PROCEDURE — 80053 COMPREHEN METABOLIC PANEL: CPT | Performed by: INTERNAL MEDICINE

## 2021-02-26 PROCEDURE — 36415 COLL VENOUS BLD VENIPUNCTURE: CPT | Performed by: INTERNAL MEDICINE

## 2021-02-26 PROCEDURE — 94640 AIRWAY INHALATION TREATMENT: CPT

## 2021-02-26 PROCEDURE — 83735 ASSAY OF MAGNESIUM: CPT | Performed by: INTERNAL MEDICINE

## 2021-02-26 PROCEDURE — 80162 ASSAY OF DIGOXIN TOTAL: CPT | Performed by: INTERNAL MEDICINE

## 2021-02-26 PROCEDURE — 250N000009 HC RX 250: Performed by: INTERNAL MEDICINE

## 2021-02-26 RX ORDER — DIGOXIN 125 MCG
125 TABLET ORAL
Status: DISCONTINUED | OUTPATIENT
Start: 2021-02-26 | End: 2021-02-26 | Stop reason: HOSPADM

## 2021-02-26 RX ORDER — AMIODARONE HYDROCHLORIDE 200 MG/1
TABLET ORAL
Qty: 60 TABLET | Refills: 3 | Status: SHIPPED | OUTPATIENT
Start: 2021-02-26 | End: 2021-03-31 | Stop reason: DRUGHIGH

## 2021-02-26 RX ORDER — DIGOXIN 125 MCG
125 TABLET ORAL
Qty: 30 TABLET | Refills: 3 | Status: SHIPPED | OUTPATIENT
Start: 2021-02-28 | End: 2022-03-16

## 2021-02-26 RX ADMIN — Medication 1 CAPSULE: at 08:30

## 2021-02-26 RX ADMIN — METOPROLOL TARTRATE 50 MG: 50 TABLET, FILM COATED ORAL at 08:30

## 2021-02-26 RX ADMIN — IPRATROPIUM BROMIDE AND ALBUTEROL SULFATE 3 ML: .5; 3 SOLUTION RESPIRATORY (INHALATION) at 06:11

## 2021-02-26 RX ADMIN — POTASSIUM CHLORIDE 40 MEQ: 750 CAPSULE, EXTENDED RELEASE ORAL at 08:30

## 2021-02-26 RX ADMIN — AMIODARONE HYDROCHLORIDE 200 MG: 200 TABLET ORAL at 08:30

## 2021-02-26 RX ADMIN — DIGOXIN 125 MCG: 125 TABLET ORAL at 08:30

## 2021-02-26 ASSESSMENT — MIFFLIN-ST. JEOR: SCORE: 1356.63

## 2021-02-26 ASSESSMENT — ACTIVITIES OF DAILY LIVING (ADL)
ADLS_ACUITY_SCORE: 17

## 2021-02-26 NOTE — PLAN OF CARE
A-fib/flutter 80's-100's.  HR increases up to to 120's upon ambulation.  Denies lightheadedness or dizziness. Stand-by assist. Alert and orientated.  Awake most of NOC.  2 stools.      Face to face report given with opportunity to observe patient.    Report given to MICHAEL Schuster.     Amanda Taylor RN   2/26/2021  7:23 AM

## 2021-02-26 NOTE — PLAN OF CARE
Pt remains A-Fib with rate improving t/o the day. Rate has generally been 's, even with activity. Pt transferred to med-surg today. Up ambulating in hallways with SBA. Tolerates well. Good appetite. Adequate UOP. Pt reminded of frequent weight shifting and repositioning. Buttocks have improved, no open areas are noted. Calls appropriately. Remains A&O.     Face to face report given with opportunity to observe patient.    Report given to MICHAEL Cheng RN   2/25/2021  7:11 PM

## 2021-02-26 NOTE — DISCHARGE SUMMARY
[facility]  Hospitalist Discharge Summary      Date of Admission:  2/22/2021  Date of Discharge:  2/26/2021  Discharging Provider: Cyril Velasquez MD      Discharge Diagnoses   Atrial fibrillation/atrial flutter with rapid ventricular response  Hypokalemia  Hypomagnesemia  Hypertension  Mild systolic congestive heart failure    Follow-ups Needed After Discharge   Follow-up Appointments     Follow-up and recommended labs and tests       Follow up with primary care provider, MARYANNE BARDALES, within 7 days for   hospital follow- up.  The following labs/tests are recommended: potassium   magnesium.  Patient does need evaluation of her heart rate and blood   pressure new medications for her atrial fibrillation/flutter    .         The patient does have a follow-up appointment with cardiology Dr. Nguyen at the [facility].    Unresulted Labs Ordered in the Past 30 Days of this Admission     No orders found from 1/23/2021 to 2/23/2021.      These results will be followed up by not needed    Discharge Disposition   Discharged to home  Condition at discharge: Stable      Hospital Course     Patient is a very pleasant 75-year-old female who has had not a lot of past medical history.  She was initially hospitalized here from 2/12/2021 through 2/14/2021.  She presented with palpitations and shortness of breath.  She was found to be in atrial fibrillation with rapid ventricular response.  The duration of this was unclear.  She had interventions performed to control her rate.  Eventually was discharged on oral Cardizem  mg and metoprolol tartrate 50 mg twice daily.  Was also placed on Xarelto 20 mg daily for stroke prophylaxis.  She was discharged home and did fair.  She had follow-up with her primary care provider Dr. Bardales.  Her heart rate was noted to be controlled at that visit.  She was given a azithromycin due to the question of some bronchitis/infiltrate.    She subsequently then started to develop  worsening dyspnea and palpitations.  And presented to our emergency room on the day of her admission 2/22/2021.  She was found to have A. fib with rapid ventricular response.  Her heart rates were 130-160 range.  Blood pressure 130 systolic range.  Sats were 90% on room air she was afebrile.    They did perform a CT scan in the emergency room for unclear reasons.  She had had 1 in the week prior.  There are some scattered infiltrates present.  Once again her procalcitonin was negative.  Normal white count.  No fevers.  No complaints of any cough or purulent sputum.  They did start her empirically on some antibiotics.  Did have an elevated BNP 8292.  Did have the complaints of dyspnea.  O2 saturations were normal.  She likely did have some mild systolic heart failure due to her A. fib with RVR.  With rate control this did improve.  Does not require any specific treatment other than I think rate control terms of her atrial fibrillation.    In regards to her atrial fibrillation, she had taken her diltiazem and metoprolol that morning.  In the ER they tried a total of 120 mg of oral diltiazem and 5 mg of IV metoprolol.  This did not control her heart rate.  She was subsequently started on a diltiazem infusion.  Did get 250 mcg of IV digoxin.  She was admitted to the ICU.  This was titrated up to 15 mg an hour without much effect on her heart rate.  Her blood pressures were on the softer side around 90 systolic.  This was subsequently discontinued and she was started on amiodarone IV fusion/load.  Even with this her heart rate still was varying between 90 and 120 just at rest.  Blood pressure was stable.  O2 sats were 96% on room air.  She had an echocardiogram performed during her last hospital stay which showed borderline reduced systolic function EF from 50 to 55%.  She had mild to moderate mitral, mild to moderate aortic, and mild to moderate tricuspid insufficiency.  We started her on her metoprolol 50 mg twice  daily in addition to the amiodarone.  This did not do much to change her rate.  Exertion she would rise to 140-150 range.  Tried a couple IV doses of metoprolol in addition to this had no significant effect.  Then tried IV digoxin.  She was loaded on this intravenously.  And rate now has been adequately controlled anywhere from .  With exertion it also is controlled.  At times also her rhythm does appear to be atrial flutter rather than atrial fibrillation.  This was documented on a 12-lead EKG.  We did continue with Xarelto for stroke prophylaxis.    She is now on a fair amount of medications the amiodarone orally 200 twice a day, digoxin 0.125 every other day and metoprolol tartrate 50 mg twice daily.  She has not had any significant bradycardia.  I do want her to follow-up closely.  She will see Dr. Ronquillo next week to monitor her heart rate and blood pressure.  Also electrolytes will be important to check.  She does have an appointment with cardiology Dr. Nguyen at the end of the month.    Certainly if her heart rate is not controlled on this regimen in future then cardiology evaluation for consideration of ablation and or transesophageal echo with cardioversion may be appropriate.    Regards to possible infection there really was no clinical evidence of a pneumonia.  All antibiotics were discontinued.  Patient does use as needed bronchodilators at home.  She did use those intermittently while here.        She is not displaying any obvious signs of congestive heart failure.  Her appetite has been good.  Renal function has been good also.    She is on the Xarelto for stroke prophylaxis.  Certainly if this becomes cost prohibitive she could be switched over to warfarin therapy.    Consultations This Hospital Stay   PHYSICAL THERAPY ADULT IP CONSULT  OCCUPATIONAL THERAPY ADULT IP CONSULT    Code Status   Full Code    Time Spent on this Encounter   I, Cyril Velasquez MD, personally saw the patient today and  spent greater than 30 minutes discharging this patient.       Cyril Velasquez MD  [facility]  [address]  [place] [address]  Phone: [phone]  Fax: [phone]  ______________________________________________________________________    Physical Exam   Vital Signs: Temp: 98.1  F (36.7  C) Temp src: Tympanic BP: 156/68 Pulse: (!) 122   Resp: 20 SpO2: 96 % O2 Device: None (Room air)    Weight: 184 lbs 15.46 oz  Constitutional: awake, alert, cooperative, no apparent distress, and appears stated age  Respiratory: No increased work of breathing, good air exchange, clear to auscultation bilaterally, no crackles or wheezing  Cardiovascular: Irregularly irregular rhythm.  No audible murmurs rubs or gallops.  Neck veins are flat.  GI: No scars, normal bowel sounds, soft, non-distended, non-tender, no masses palpated, no hepatosplenomegally  Musculoskeletal: There is no redness, warmth, or swelling of the joints.  Full range of motion noted.  Motor strength is 5 out of 5 all extremities bilaterally.  Tone is normal.  Neurologic: Awake, alert, oriented to name, place and time.  Cranial nerves II-XII are grossly intact.  Motor is 5 out of 5 bilaterally.  Cerebellar finger to nose, heel to shin intact.  Sensory is intact.  Babinski down going, Romberg negative, and gait is normal.       Primary Care Physician   MARYANNE CABALLERO    Discharge Orders      Reason for your hospital stay    Patient presented with increasing shortness of breath.  Atrial fibrillation with rapid ventricular response was present.  Was quite difficult to get adequate rate control.  Requiring multiple medications.  Currently on amiodarone digoxin and metoprolol.  Also is on Xarelto for stroke prophylaxis.     Follow-up and recommended labs and tests     Follow up with primary care provider, MARYANNE CABALLERO, within 7 days for hospital follow- up.  The following labs/tests are recommended: potassium magnesium.  Patient does need evaluation of her  heart rate and blood pressure new medications for her atrial fibrillation/flutter    .     Activity    Your activity upon discharge: activity as tolerated     Full Code     Diet    Follow this diet upon discharge: Regular       Significant Results and Procedures   Most Recent 3 CBC's:  Recent Labs   Lab Test 02/26/21  0524 02/25/21 0442 02/24/21  0451   WBC 6.4 5.4 5.4   HGB 12.1 11.4* 11.3*   MCV 93 94 92    234 218     Most Recent 3 BMP's:  Recent Labs   Lab Test 02/26/21  0524 02/25/21 0442 02/24/21  0451    138 137   POTASSIUM 4.5 4.5 3.1*   CHLORIDE 106 107 101   CO2 26 30 29   BUN 13 11 11   CR 0.74 0.84 0.80   ANIONGAP 4 1* 7   KORI 8.9 8.7 8.7   * 114* 136*     Most Recent 2 LFT's:  Recent Labs   Lab Test 02/26/21 0524 02/25/21 0442   AST 13 15   ALT 23 23   ALKPHOS 60 57   BILITOTAL 0.5 0.5     Most Recent 3 Troponin's:  Recent Labs   Lab Test 02/23/21 0444 02/22/21  1412 02/12/21  2308   TROPI <0.015 <0.015 <0.015     Most Recent 3 BNP's:  Recent Labs   Lab Test 02/22/21  1412 02/12/21  1702   NTBNPI 8,292* 6,001*     Most Recent 6 Bacteria Isolates From Any Culture (See EPIC Reports for Culture Details):  Recent Labs   Lab Test 02/22/21 2200   CULT <10,000 colonies/mL  mixed urogenital norris  Identification and susceptibility to follow.       Most Recent TSH and T4:  Recent Labs   Lab Test 02/13/21  0522   TSH 1.61     Most Recent 6 glucoses:  Recent Labs   Lab Test 02/26/21  0524 02/25/21  0442 02/24/21  0451 02/23/21  0444 02/22/21  1412 02/13/21  0522   * 114* 136* 159* 152* 135*     Most Recent Urinalysis:  Recent Labs   Lab Test 02/22/21  2200   COLOR Straw   APPEARANCE Clear   URINEGLC Negative   URINEBILI Negative   URINEKETONE Negative   SG 1.006   UBLD Negative   URINEPH 5.0   PROTEIN Negative   NITRITE Negative   LEUKEST Moderate*   RBCU 2   WBCU 10*   ,   Results for orders placed or performed during the hospital encounter of 02/22/21   Chest CT w/o contrast     Narrative    CT CHEST W/O CONTRAST  2/22/2021 2:49 PM    CLINICAL HISTORY: Female, age 75 years,  Pneumonia, effusion or  abscess suspected, xray done;    Comparison:  CT scan of chest 2/12/2021    TECHNIQUE:  CT was performed of the chest  without contrast.   Sagittal, coronal, axial and MIP reconstructions were reviewed.     FINDINGS:  Chest CT:    Lungs: There is worsening consolidation now seen throughout the  anterior segment of the left upper lobe, anterior and apical segment  of the right upper lobe, medial segment of the right medial lobe and  anterior segment of the right lower lobe.    Pleura: Small left and moderate size right pleural effusions have  developed since the prior examination.    Lymph nodes: There are number of normal-sized and mildly enlarged  lymph nodes are seen throughout the mediastinum and hilar regions,  more evident on the right.    Thyroid gland is similar appearance with a large 5 cm mass again seen  within the left lobe.    Esophagus: Moderate size hiatal hernia is similar in appearance  without acute abnormality.    Bony structures: No acute abnormality. Degenerative changes throughout  the thoracic spine are similar in appearance.      Upper abdomen: No acute abnormality.      Impression    IMPRESSION:   Worsening multifocal pneumonia with new small left and small a  moderate size right effusion and worsening mediastinal/hilar  lymphadenopathy.     Stable appearance of 11.5 x 9 mm subpleural nodule located laterally  in the left lower lobe.    IZZY ROWELL MD       Discharge Medications   Current Discharge Medication List      START taking these medications    Details   amiodarone (PACERONE) 200 MG tablet Take 1 tablet (200 mg) by mouth 2 times daily for 21 days, THEN 1 tablet (200 mg) daily.  Qty: 60 tablet, Refills: 3    Associated Diagnoses: Atrial fibrillation with RVR (H)      digoxin (LANOXIN) 125 MCG tablet Take 1 tablet (125 mcg) by mouth every 48 hours  Qty: 30  tablet, Refills: 3    Associated Diagnoses: Atrial fibrillation with RVR (H)         CONTINUE these medications which have NOT CHANGED    Details   albuterol (PROVENTIL) (2.5 MG/3ML) 0.083% neb solution Take 2.5 mg by nebulization every 6 hours as needed for shortness of breath / dyspnea or wheezing      Albuterol Sulfate (PROAIR HFA IN) Inhale 2 puffs into the lungs every 4 hours as needed      gentamicin (GARAMYCIN) 0.3 % ophthalmic solution INSTILL 2 DROPS INTO EACH EYE EVERY 4 HOURS AS NEEDED FOR DRY EYES      MECLIZINE HCL PO Take 25 mg by mouth as needed for dizziness      metoprolol tartrate (LOPRESSOR) 50 MG tablet Take 1 tablet (50 mg) by mouth 2 times daily  Qty: 60 tablet, Refills: 3    Associated Diagnoses: Atrial fibrillation with rapid ventricular response (H)      rivaroxaban ANTICOAGULANT (XARELTO) 20 MG TABS tablet Take 1 tablet (20 mg) by mouth daily (with dinner)  Qty: 30 tablet, Refills: 3    Associated Diagnoses: Atrial fibrillation with rapid ventricular response (H)         STOP taking these medications       diltiazem ER (DILT-XR) 240 MG 24 hr ER beaded capsule Comments:   Reason for Stopping:             Allergies   Allergies   Allergen Reactions     Aspirin      Heart palpitations

## 2021-02-26 NOTE — PROGRESS NOTES
Name: Kerri Gonsales    MRN#: [id number]    Reason for Hospitalization: Atrial fibrillation with RVR (H) [I48.91]  Pneumonia due to infectious organism, unspecified laterality, unspecified part of lung [J18.9]    Discharge Date: February 26, 2021    Patient / Family response to discharge plan: Agreeable     Follow-Up Appt:   Future Appointments   Date Time Provider Department Center   3/31/2021  2:30 PM Abel Nguyen, DO [facility]       Other Providers (Care Coordinator, [facility], PCA services etc): No    Discharge Disposition: home    Mariann Mendoza CM

## 2021-02-26 NOTE — PLAN OF CARE
No acute changes. Telemetry remains a-flutter 80-110s. Ambulates SBA. Reminded to weight shift frequently; no open areas noted on coccyx. IV locked. Denies pain. Alarms on for safety. Call light remains within reach and makes needs known.     Face to face report given with opportunity to observe patient.    Report given to MICHAEL Patel RN   2/25/2021  11:41 PM

## 2021-02-26 NOTE — PLAN OF CARE
A&Ox4 . VSS. Remained in afib, rate 100's. Offered no complaints. Up independent in room. Makes needs known.      Patient discharged at 3:15PM via wheel chair accompanied by  staff. Prescriptions sent to patients preferred pharmacy. All belongings sent with patient.     Discharge instructions reviewed with pt. Listed belongings gathered and returned to patient. Clothing, shoes, coat    Patient discharged to home.   Report called to n/a    Core Measures and Vaccines  Core Measures applicable during stay: No.   Pneumonia and Influenza given prior to discharge, if indicated: no  Surgical Patient   Surgical Procedures during stay: no  Did patient receive discharge instruction on wound care and recognition of infection symptoms? N/A    MISC  Follow up appointment made:  Yes--Dr Bardales 3/5/21 at 1015  Home and hospital aquired medications returned to patient: Yes  Patient reports pain was well managed at discharge: Yes

## 2021-03-01 NOTE — TELEPHONE ENCOUNTER
Called patient she is doing well, she states she is not 100 %, but definably better then she was, writer stated unfortunately at this time we have no new pt. Spots with Dr. Nguyen until her scheduled appointment on the 31 she is okay with that and states if she needs to be seen sooner she can call and we will see where we can fit her in.

## 2021-03-30 NOTE — PROGRESS NOTES
[facility] HEART CARE   CARDIOLOGY CONSULT     Kerri Gonsales   1945  [id number]    Telly Bardales     Chief Complaint   Patient presents with     Atrial Fib          HPI:   Mrs. Larkin is a 75-year-old female who has seen a doctor rarely up until being admitted to the hospital on 2/12/2021 for new onset A. fib.  She is presently on digoxin, metoprolol, Coumadin, and amiodarone for atrial fibrillation.  [id number] score is 4.    She also has a history of hypokalemia, hypomagnesia, suspected diastolic dysfunction with peripheral edema with a BNP of 8292, pneumonia on 2/12/2021, goiter on 2/12/2021, moderate hiatal hernia on 2/22/2021, arthritis in the spine, and hypertension.    She had been seen on a yearly basis for regular well check examinations.  On 2/12/2021, she presented to the ER as she had been dyspneic for a few days.  She was found to have new onset A. fib with rate of 120 bpm on her EKG.  She was admitted to the hospital.  She was provided with diltiazem IV twice with improved rates.  She was also placed on metoprolol and started on Xarelto 20 mg daily.  Hydrochlorothiazide and propranolol were discontinued.  She was discharged on metoprolol and diltiazem.    She was seen for 2nd time on 2/22/2021 in the ER.  She had worsening dyspnea and palpitations.  She was found to be in A. fib with RVR with rates in the 130-160's.  Blood pressure was in 130's systolically and saturations in the 90's on room air.  BNP was 8292.  She was given oral diltiazem and IV metoprolol in the ER.  She was then subsequently loaded on digoxin IV and admitted to the ICU.  IV diltiazem was titrated without much effect.  She was started on amiodarone with heart rates in the 90's to 120's.  Echo from 2/22/2021 showed her low normal EF of 50-55%.  Metoprolol 50 mg tartrate twice a day was added.  Her heart rate with activity would increase to the 140-150's.  Ultimately, she was discharged on amiodarone 200 mg twice a day, then  200 mg daily, digoxin 125  g every other day, metoprolol 50 mg tartrate twice a day, and Xarelto 20 mg daily.    Heart rate is better controlled.  But he will increase with activity.  She remains dyspneic with activity but does not complain of any significant palpitations.  Xarelto was not affordable as she is on a limited income and was ultimately converted to Coumadin.    Was also mention that she was noted have a goiter, moderate hiatal hernia, left lower lobe nodule and arthritis in the spine on 2/12/2021.      IMAGING RESULTS:   ECHO on 2/22/21:  No pericardial effusion is present.  Borderline (EF 50-55%) reduced left ventricular function is present.  Diastolic function not assessed due to   The right ventricle is normal size.  Mild left atrial enlargement is present.  Mild to moderate mitral insufficiency is present.  The valve leaflets are not well visualized.  Mild to moderate aortic insufficiency is present.  Mild to moderate tricuspid insufficiency is present.  Trace to mild pulmonic insufficiency is present.  The aorta root is normal.    CTA chest on 2/12/21:  Right upper lobe and right middle lobe infiltrate  suspicious for pneumonia.  substernal goiter deviating the trachea from left to right.  11 mm in diameter nodule in the left lower lobe.         CURRENT MEDICATIONS:   Prior to Admission medications    Medication Sig Start Date End Date Taking? Authorizing Provider   albuterol (PROVENTIL) (2.5 MG/3ML) 0.083% neb solution Take 2.5 mg by nebulization every 6 hours as needed for shortness of breath / dyspnea or wheezing    Reported, Patient   Albuterol Sulfate (PROAIR HFA IN) Inhale 2 puffs into the lungs every 4 hours as needed    Reported, Patient   amiodarone (PACERONE) 200 MG tablet Take 1 tablet (200 mg) by mouth 2 times daily for 21 days, THEN 1 tablet (200 mg) daily. 2/26/21 6/27/21  Cyril Velasquez MD   digoxin (LANOXIN) 125 MCG tablet Take 1 tablet (125 mcg) by mouth every 48 hours 2/28/21    Cyril Velasquez MD   gentamicin (GARAMYCIN) 0.3 % ophthalmic solution INSTILL 2 DROPS INTO EACH EYE EVERY 4 HOURS AS NEEDED FOR DRY EYES 5/6/20   Reported, Patient   MECLIZINE HCL PO Take 25 mg by mouth as needed for dizziness    Reported, Patient   metoprolol tartrate (LOPRESSOR) 50 MG tablet Take 1 tablet (50 mg) by mouth 2 times daily 2/14/21 6/14/21  Jenaro Logan MD   rivaroxaban ANTICOAGULANT (XARELTO) 20 MG TABS tablet Take 1 tablet (20 mg) by mouth daily (with dinner) 2/14/21   Jenaro Logan MD       ALLERGIES:   Allergies   Allergen Reactions     Aspirin      Heart palpitations        PAST MEDICAL HISTORY:   No past medical history on file.     PAST SURGICAL HISTORY:   Past Surgical History:   Procedure Laterality Date     APPENDECTOMY       AS REMOVAL OF OVARY/TUBE(S) N/A         FAMILY HISTORY:   No family history on file.     SOCIAL HISTORY:   Social History     Socioeconomic History     Marital status:      Spouse name: Not on file     Number of children: Not on file     Years of education: Not on file     Highest education level: Not on file   Occupational History     Not on file   Social Needs     Financial resource strain: Not on file     Food insecurity     Worry: Not on file     Inability: Not on file     Transportation needs     Medical: Not on file     Non-medical: Not on file   Tobacco Use     Smoking status: Never Smoker     Smokeless tobacco: Never Used   Substance and Sexual Activity     Alcohol use: Not Currently     Drug use: Never     Sexual activity: Not on file   Lifestyle     Physical activity     Days per week: Not on file     Minutes per session: Not on file     Stress: Not on file   Relationships     Social connections     Talks on phone: Not on file     Gets together: Not on file     Attends Baptism service: Not on file     Active member of club or organization: Not on file     Attends meetings of clubs or organizations: Not on file     Relationship status: Not on  file     Intimate partner violence     Fear of current or ex partner: Not on file     Emotionally abused: Not on file     Physically abused: Not on file     Forced sexual activity: Not on file   Other Topics Concern     Not on file   Social History Narrative     Not on file          ROS:   CONSTITUTIONAL: No weight loss, fever, chills, but admits to weakness and fatigue.   HEENT: Eyes: No visual changes. Ears, Nose, Throat: No hearing loss, congestion or difficulty swallowing.   CARDIOVASCULAR: No chest pain, chest pressure or chest discomfort. No palpitations but admits to lower extremity edema.   RESPIRATORY: (+) shortness of breath with dyspnea upon exertion, no cough or sputum production.   GASTROINTESTINAL: No abdominal pain. No anorexia, nausea, vomiting or diarrhea.   NEUROLOGICAL: No headache, lightheadedness, dizziness, syncope, ataxia but admits to generalized weakness.   HEMATOLOGIC: No anemia, bleeding or bruising.   PSYCHIATRIC: No history of depression or anxiety.   ENDOCRINOLOGIC: No reports of sweating, cold or heat intolerance. No polyuria or polydipsia.   SKIN: No abnormal rashes or itching.       PHYSICAL EXAM:   GENERAL: The patient is a well-developed, well-nourished, in no apparent distress. Alert and oriented x3.   HEENT: Head is normocephalic and atraumatic. Eyes are symmetrical with normal visual tracking.  HEART: Regular rate and rhythm, S1S2 present without murmur, rub or gallop.   LUNGS: Irregular irregular and unlabored. Clear to auscultation.   EXTREMITIES: +1/4 peripheral edema present.   NEUROLOGIC: Alert and oriented X3.    SKIN: No jaundice. No rashes or visible skin lesions present.        LAB RESULTS:   No visits with results within 2 Month(s) from this visit.   Latest known visit with results is:   No results found for any previous visit.          ASSESSMENT:       ICD-10-CM    1. New onset a-fib on 2/12/2021 (H)  I48.91 Leadless EKG Monitor 8 to 14 Days     metoprolol tartrate  (LOPRESSOR) 100 MG tablet     TSH with free T4 reflex     amiodarone (PACERONE) 200 MG tablet   2. Atrial fibrillation with RVR (H)  I48.91 Leadless EKG Monitor 8 to 14 Days     metoprolol tartrate (LOPRESSOR) 100 MG tablet     TSH with free T4 reflex     amiodarone (PACERONE) 200 MG tablet   3. Persistent atrial fibrillation (H)  I48.19 Leadless EKG Monitor 8 to 14 Days     metoprolol tartrate (LOPRESSOR) 100 MG tablet     TSH with free T4 reflex     amiodarone (PACERONE) 200 MG tablet   4. On amiodarone therapy  Z79.899 Leadless EKG Monitor 8 to 14 Days     Comprehensive metabolic panel     amiodarone (PACERONE) 200 MG tablet     Pulmonary Function Test     X-ray Chest 2 vws*     Magnesium   5. Hypokalemia  E87.6 Comprehensive metabolic panel   6. Hypomagnesemia  E83.42 Comprehensive metabolic panel   7. Chronic diastolic heart failure (H)  I50.32 Comprehensive metabolic panel     X-ray Chest 2 vws*     BNP-N terminal pro   8. Elevated brain natriuretic peptide (BNP) level  R79.89    9. Lower extremity edema  R60.0 BNP-N terminal pro   10. History of pneumonia on 2/12/2021  Z87.01    11. Goiter  E04.9    12. H/O hiatal hernia-moderate on 2/22/2021  Z87.19    13. Left lower lobe pulmonary nodule on 2/12/2021  R91.1    14. Osteoarthritis of spine  M47.9    15. Simple chronic bronchitis (H)  J41.0    16. Benign essential hypertension  I10 metoprolol tartrate (LOPRESSOR) 100 MG tablet   17. On Coumadin for atrial fibrillation (H)  I48.91 CBC with platelets    Z79.01    18. Hyperglycemia  R73.9 Hemoglobin A1c   19. Lipid screening  Z13.220 Lipid Profile (Chol, Trig, HDL, LDL calc)         PLAN:   1.  We had a long discussion about atrial fibrillation which included the heart model in the room.  We discussed her medications and potential cardioversion.  Her heart rate still runs fast presently on amiodarone 200 mg daily, digoxin 125  g a day and metoprolol 50 mg twice a day.  She remains dyspneic and fatigued.  Changes  were made as outlined below.  2.  Metoprolol tartrate 50 mg twice a day was increased to metoprolol tartrate 100 mg twice a day.  3.  She is currently on amiodarone 200 mg daily.  Will need labs and EKG every 6 months.  4.  Being that she is on amiodarone, she will need a chest x-ray, eye exam, and PFT's on yearly basis.  Will obtain a baseline PFT's now.  Her last chest x-ray was on 2/12/2021.  We will plan for repeat chest x-ray in February, 2022.  She understands she needs an eye exam and will schedule that.  5.  Being that she is on amiodarone, will need an EKG and labs every 6 months.  Labs to include lipid, A1c, magnesium, BNP, CBC, CMP, and TSH.  6.  She does have some mild peripheral edema with a BNP of 8292 on 2/22/2021.  Briefly, we discussed Lasix but was declined.  We will revisit this at her next visit.  7.  Follow-up in a few months after completion of testing to assess how she is doing.        Thank you for allowing me to participate in the care of your patient. Please do not hesitate to contact me if you have any questions.     Abel Nguyen, DO

## 2021-03-31 ENCOUNTER — OFFICE VISIT (OUTPATIENT)
Dept: CARDIOLOGY | Facility: OTHER | Age: 76
End: 2021-03-31
Attending: INTERNAL MEDICINE
Payer: COMMERCIAL

## 2021-03-31 ENCOUNTER — PATIENT OUTREACH (OUTPATIENT)
Dept: CARE COORDINATION | Facility: OTHER | Age: 76
End: 2021-03-31
Payer: COMMERCIAL

## 2021-03-31 ENCOUNTER — APPOINTMENT (OUTPATIENT)
Dept: GENERAL RADIOLOGY | Facility: OTHER | Age: 76
End: 2021-03-31
Attending: INTERNAL MEDICINE
Payer: MEDICARE

## 2021-03-31 VITALS
RESPIRATION RATE: 12 BRPM | HEART RATE: 133 BPM | OXYGEN SATURATION: 98 % | SYSTOLIC BLOOD PRESSURE: 152 MMHG | BODY MASS INDEX: 29.41 KG/M2 | HEIGHT: 67 IN | DIASTOLIC BLOOD PRESSURE: 90 MMHG | WEIGHT: 187.4 LBS

## 2021-03-31 DIAGNOSIS — Z13.220 LIPID SCREENING: ICD-10-CM

## 2021-03-31 DIAGNOSIS — Z87.01 HISTORY OF PNEUMONIA: ICD-10-CM

## 2021-03-31 DIAGNOSIS — M47.9 OSTEOARTHRITIS OF SPINE, UNSPECIFIED SPINAL OSTEOARTHRITIS COMPLICATION STATUS, UNSPECIFIED SPINAL REGION: ICD-10-CM

## 2021-03-31 DIAGNOSIS — I48.91 NEW ONSET A-FIB (H): Primary | ICD-10-CM

## 2021-03-31 DIAGNOSIS — I48.19 PERSISTENT ATRIAL FIBRILLATION (H): ICD-10-CM

## 2021-03-31 DIAGNOSIS — R91.1 LEFT LOWER LOBE PULMONARY NODULE: ICD-10-CM

## 2021-03-31 DIAGNOSIS — I48.91 ATRIAL FIBRILLATION WITH RVR (H): ICD-10-CM

## 2021-03-31 DIAGNOSIS — I50.32 CHRONIC DIASTOLIC HEART FAILURE (H): ICD-10-CM

## 2021-03-31 DIAGNOSIS — R60.0 LOWER EXTREMITY EDEMA: ICD-10-CM

## 2021-03-31 DIAGNOSIS — E04.9 GOITER: ICD-10-CM

## 2021-03-31 DIAGNOSIS — R73.9 HYPERGLYCEMIA: ICD-10-CM

## 2021-03-31 DIAGNOSIS — R79.89 ELEVATED BRAIN NATRIURETIC PEPTIDE (BNP) LEVEL: ICD-10-CM

## 2021-03-31 DIAGNOSIS — J41.0 SIMPLE CHRONIC BRONCHITIS (H): ICD-10-CM

## 2021-03-31 DIAGNOSIS — E83.42 HYPOMAGNESEMIA: ICD-10-CM

## 2021-03-31 DIAGNOSIS — Z79.899 ON AMIODARONE THERAPY: ICD-10-CM

## 2021-03-31 DIAGNOSIS — I48.91 ON COUMADIN FOR ATRIAL FIBRILLATION (H): ICD-10-CM

## 2021-03-31 DIAGNOSIS — Z79.01 ON COUMADIN FOR ATRIAL FIBRILLATION (H): ICD-10-CM

## 2021-03-31 DIAGNOSIS — Z87.19 H/O HIATAL HERNIA: ICD-10-CM

## 2021-03-31 DIAGNOSIS — E87.6 HYPOKALEMIA: ICD-10-CM

## 2021-03-31 DIAGNOSIS — I10 BENIGN ESSENTIAL HYPERTENSION: ICD-10-CM

## 2021-03-31 PROCEDURE — G0463 HOSPITAL OUTPT CLINIC VISIT: HCPCS | Mod: 25

## 2021-03-31 PROCEDURE — 93010 ELECTROCARDIOGRAM REPORT: CPT | Performed by: INTERNAL MEDICINE

## 2021-03-31 PROCEDURE — G0463 HOSPITAL OUTPT CLINIC VISIT: HCPCS

## 2021-03-31 PROCEDURE — 99205 OFFICE O/P NEW HI 60 MIN: CPT | Performed by: INTERNAL MEDICINE

## 2021-03-31 PROCEDURE — 93005 ELECTROCARDIOGRAM TRACING: CPT

## 2021-03-31 RX ORDER — METOPROLOL TARTRATE 100 MG
100 TABLET ORAL 2 TIMES DAILY
Qty: 180 TABLET | Refills: 3 | Status: SHIPPED | OUTPATIENT
Start: 2021-03-31 | End: 2021-06-02

## 2021-03-31 RX ORDER — AMIODARONE HYDROCHLORIDE 200 MG/1
200 TABLET ORAL DAILY
Qty: 90 TABLET | Refills: 3 | Status: SHIPPED | OUTPATIENT
Start: 2021-03-31 | End: 2021-10-04 | Stop reason: SINTOL

## 2021-03-31 RX ORDER — WARFARIN SODIUM 5 MG/1
TABLET ORAL
COMMUNITY

## 2021-03-31 ASSESSMENT — PAIN SCALES - GENERAL: PAINLEVEL: NO PAIN (0)

## 2021-03-31 ASSESSMENT — MIFFLIN-ST. JEOR: SCORE: 1367.67

## 2021-03-31 NOTE — PATIENT INSTRUCTIONS
Thank you for allowing Dr. Nguyen and our  team to participate in your care. Please call our office at [phone] with scheduling questions or if you need to cancel or change your appointment. With any other questions or concerns you may call Janett cardiology nurse at [phone].       If you experience chest pain, chest pressure, chest tightness, shortness of breath, fainting, lightheadedness, nausea, vomiting, or other concerning symptoms, please report to the Emergency Department or call 911. These symptoms may be emergent, and best treated in the Emergency Department.    Follow up in 2 months     Labs  in 6 months.    Chest Xray in 1 year.    Need Eye exam if you have now recently had one and should have one once every year.     You will be scheduled for an appointment to have a Zio Patch placed on the skin.  This monitor will be worn for 14 days.  This is a device that will monitor your heart rate, and rhythm. The hospital scheduling department will contact you to schedule this appointment, and educate you on the use of this patch.     The hospital will call you to schedule a pulmonary function test to evaluate your lung function now and in 1 year.     Change Metoprolol to 100 mg twice daily.    Amiodarone 200 mg once daily.    Consider lasix     Continue on coumadin

## 2021-04-07 ENCOUNTER — TELEPHONE (OUTPATIENT)
Dept: CARDIOLOGY | Facility: OTHER | Age: 76
End: 2021-04-07

## 2021-04-07 DIAGNOSIS — R79.89 ELEVATED BRAIN NATRIURETIC PEPTIDE (BNP) LEVEL: ICD-10-CM

## 2021-04-07 DIAGNOSIS — I10 BENIGN ESSENTIAL HYPERTENSION: ICD-10-CM

## 2021-04-07 DIAGNOSIS — I50.32 CHRONIC DIASTOLIC HEART FAILURE (H): Primary | ICD-10-CM

## 2021-04-07 DIAGNOSIS — R60.0 LOWER EXTREMITY EDEMA: ICD-10-CM

## 2021-04-07 RX ORDER — FUROSEMIDE 20 MG
20 TABLET ORAL DAILY
Qty: 90 TABLET | Refills: 3 | Status: SHIPPED | OUTPATIENT
Start: 2021-04-07 | End: 2021-06-02 | Stop reason: DRUGHIGH

## 2021-04-07 NOTE — TELEPHONE ENCOUNTER
She called today and stated her neck bothered her  this weekened under her ears felt like a band was around ears which she has never felt before, she states feet and both ankles continue to be swollen, no change since she saw you last and is having some SOB, in which she did use her nebulizer at 2 am and 8 am and she states that was helpful, Zio patch your recommended will be placed tmrw. She is aware neck issue, will most likely need to be addressed by primary. Please let me know you thoughts.   Thank you, Janett Beltrán LPN

## 2021-04-07 NOTE — TELEPHONE ENCOUNTER
You are correct, she should see her primary related to her neck issue.  I might start her on furosemide/Lasix 20 mg daily which is a water pill.  Hopefully this will help with her shortness of breath as well.  She is to take this in the morning.  The prescription was sent to [facility].  Thanks!     Dr. Nguyen

## 2021-04-08 ENCOUNTER — HOSPITAL ENCOUNTER (OUTPATIENT)
Dept: CARDIOLOGY | Facility: HOSPITAL | Age: 76
Discharge: HOME | End: 2021-04-08
Attending: INTERNAL MEDICINE | Admitting: INTERNAL MEDICINE
Payer: MEDICARE

## 2021-04-08 DIAGNOSIS — I48.91 ATRIAL FIBRILLATION WITH RVR (H): ICD-10-CM

## 2021-04-08 DIAGNOSIS — I48.91 NEW ONSET A-FIB (H): ICD-10-CM

## 2021-04-08 DIAGNOSIS — Z79.899 ON AMIODARONE THERAPY: ICD-10-CM

## 2021-04-08 DIAGNOSIS — I48.19 PERSISTENT ATRIAL FIBRILLATION (H): ICD-10-CM

## 2021-04-08 PROCEDURE — 93246 EXT ECG>7D<15D RECORDING: CPT

## 2021-04-08 PROCEDURE — 93248 EXT ECG>7D<15D REV&INTERPJ: CPT | Performed by: INTERNAL MEDICINE

## 2021-04-14 ENCOUNTER — TELEPHONE (OUTPATIENT)
Dept: CARDIOLOGY | Facility: OTHER | Age: 76
End: 2021-04-14

## 2021-04-14 ENCOUNTER — APPOINTMENT (OUTPATIENT)
Dept: GENERAL RADIOLOGY | Facility: HOSPITAL | Age: 76
End: 2021-04-14
Attending: PHYSICIAN ASSISTANT
Payer: MEDICARE

## 2021-04-14 ENCOUNTER — HOSPITAL ENCOUNTER (EMERGENCY)
Facility: HOSPITAL | Age: 76
Discharge: HOME | End: 2021-04-14
Attending: PHYSICIAN ASSISTANT | Admitting: PHYSICIAN ASSISTANT
Payer: MEDICARE

## 2021-04-14 VITALS
OXYGEN SATURATION: 96 % | DIASTOLIC BLOOD PRESSURE: 85 MMHG | TEMPERATURE: 97.1 F | SYSTOLIC BLOOD PRESSURE: 170 MMHG | HEART RATE: 100 BPM | RESPIRATION RATE: 16 BRPM

## 2021-04-14 DIAGNOSIS — R05.9 COUGH: ICD-10-CM

## 2021-04-14 DIAGNOSIS — I48.91 ATRIAL FIBRILLATION WITH RVR (H): ICD-10-CM

## 2021-04-14 LAB
BASOPHILS # BLD AUTO: 0 10E9/L (ref 0–0.2)
BASOPHILS NFR BLD AUTO: 0.6 %
DIFFERENTIAL METHOD BLD: NORMAL
EOSINOPHIL # BLD AUTO: 0.3 10E9/L (ref 0–0.7)
EOSINOPHIL NFR BLD AUTO: 4.9 %
ERYTHROCYTE [DISTWIDTH] IN BLOOD BY AUTOMATED COUNT: 14.5 % (ref 10–15)
FLUAV RNA RESP QL NAA+PROBE: NEGATIVE
FLUBV RNA RESP QL NAA+PROBE: NEGATIVE
HCT VFR BLD AUTO: 41.8 % (ref 35–47)
HGB BLD-MCNC: 13.7 G/DL (ref 11.7–15.7)
IMM GRANULOCYTES # BLD: 0 10E9/L (ref 0–0.4)
IMM GRANULOCYTES NFR BLD: 0.5 %
INR PPP: 1.84 (ref 0.86–1.14)
LABORATORY COMMENT REPORT: NORMAL
LYMPHOCYTES # BLD AUTO: 1.8 10E9/L (ref 0.8–5.3)
LYMPHOCYTES NFR BLD AUTO: 28.6 %
MCH RBC QN AUTO: 30.6 PG (ref 26.5–33)
MCHC RBC AUTO-ENTMCNC: 32.8 G/DL (ref 31.5–36.5)
MCV RBC AUTO: 93 FL (ref 78–100)
MONOCYTES # BLD AUTO: 0.4 10E9/L (ref 0–1.3)
MONOCYTES NFR BLD AUTO: 6.6 %
NEUTROPHILS # BLD AUTO: 3.8 10E9/L (ref 1.6–8.3)
NEUTROPHILS NFR BLD AUTO: 58.8 %
NRBC # BLD AUTO: 0 10*3/UL
NRBC BLD AUTO-RTO: 0 /100
PLATELET # BLD AUTO: 205 10E9/L (ref 150–450)
RBC # BLD AUTO: 4.48 10E12/L (ref 3.8–5.2)
RSV RNA SPEC QL NAA+PROBE: NEGATIVE
SARS-COV-2 RNA RESP QL NAA+PROBE: NEGATIVE
SPECIMEN SOURCE: NORMAL
WBC # BLD AUTO: 6.4 10E9/L (ref 4–11)

## 2021-04-14 PROCEDURE — 71046 X-RAY EXAM CHEST 2 VIEWS: CPT

## 2021-04-14 PROCEDURE — 99213 OFFICE O/P EST LOW 20 MIN: CPT | Performed by: PHYSICIAN ASSISTANT

## 2021-04-14 PROCEDURE — G0463 HOSPITAL OUTPT CLINIC VISIT: HCPCS | Mod: 25

## 2021-04-14 PROCEDURE — G0463 HOSPITAL OUTPT CLINIC VISIT: HCPCS

## 2021-04-14 PROCEDURE — 85025 COMPLETE CBC W/AUTO DIFF WBC: CPT | Performed by: PHYSICIAN ASSISTANT

## 2021-04-14 PROCEDURE — 87636 SARSCOV2 & INF A&B AMP PRB: CPT | Performed by: PHYSICIAN ASSISTANT

## 2021-04-14 PROCEDURE — 85610 PROTHROMBIN TIME: CPT | Performed by: PHYSICIAN ASSISTANT

## 2021-04-14 PROCEDURE — 36415 COLL VENOUS BLD VENIPUNCTURE: CPT | Performed by: PHYSICIAN ASSISTANT

## 2021-04-14 ASSESSMENT — ENCOUNTER SYMPTOMS
CHEST TIGHTNESS: 0
NECK STIFFNESS: 0
ACTIVITY CHANGE: 0
CHILLS: 0
BACK PAIN: 0
PALPITATIONS: 0
COUGH: 1
NECK PAIN: 0
BRUISES/BLEEDS EASILY: 1
SHORTNESS OF BREATH: 0
APPETITE CHANGE: 0
DIZZINESS: 0
FEVER: 0
ABDOMINAL PAIN: 0

## 2021-04-14 NOTE — ED TRIAGE NOTES
Pt presents with a cough for 3 days and she states that she gets short of breath with coughing. States she feels better when she take her neb treatments

## 2021-04-14 NOTE — TELEPHONE ENCOUNTER
Called Cardiology with coughing with SOB not productive coughing mucus clear thin and then she spits it out she is doing neb treatments are helpful, she is advised to see her primary and she agrees and to be seen in UC if anything is needed emergently.

## 2021-04-15 NOTE — DISCHARGE INSTRUCTIONS
Your laboratory evaluation was unrevealing.  Your chest x-ray showed no evidence of pneumonia.    At this time I do not believe that antibiotics would be in your best medical interest.    Follow-up in the clinic.    Please return here for any worsening symptoms, new symptoms, or other concerns.

## 2021-04-15 NOTE — ED PROVIDER NOTES
History     Chief Complaint   Patient presents with     Cough     c/o cough     The history is provided by the patient.     Kerri Gonsales is a 75 year old female who presented to the urgent care ambulatory for evaluation of a 3-day history of a cough with mild clear production.  No significant dyspnea.  No chest pain.  Recent diagnosis of persistent atrial fibrillation and currently on Coumadin as well as amiodarone and metoprolol.  Denies any fevers or chills.  Denies any falls.    Allergies:  Allergies   Allergen Reactions     Aspirin      Heart palpitations       Problem List:    Patient Active Problem List    Diagnosis Date Noted     Persistent atrial fibrillation (H) 03/31/2021     Priority: Medium     On amiodarone therapy 03/31/2021     Priority: Medium     Elevated brain natriuretic peptide (BNP) level 03/31/2021     Priority: Medium     History of pneumonia on 2/12/2021 03/31/2021     Priority: Medium     Goiter 03/31/2021     Priority: Medium     H/O hiatal hernia-moderate on 2/22/2021 03/31/2021     Priority: Medium     Left lower lobe pulmonary nodule on 2/12/2021 03/31/2021     Priority: Medium     Osteoarthritis of spine 03/31/2021     Priority: Medium     Lower extremity edema 03/31/2021     Priority: Medium     On Coumadin for atrial fibrillation (H) 03/31/2021     Priority: Medium     Atrial fibrillation with RVR (H) 02/22/2021     Priority: Medium     Hypokalemia 02/12/2021     Priority: Medium     Hypomagnesemia 02/12/2021     Priority: Medium     New onset a-fib (H) 02/12/2021     Priority: Medium     Chronic diastolic heart failure (H) 02/12/2021     Priority: Medium     Simple chronic bronchitis (H) 02/12/2021     Priority: Medium     Benign essential hypertension 02/12/2021     Priority: Medium        Past Medical History:    No past medical history on file.    Past Surgical History:    Past Surgical History:   Procedure Laterality Date     APPENDECTOMY       AS REMOVAL OF OVARY/TUBE(S)  N/A        Family History:    No family history on file.    Social History:  Marital Status:   [4]  Social History     Tobacco Use     Smoking status: Never Smoker     Smokeless tobacco: Never Used   Substance Use Topics     Alcohol use: Not Currently     Drug use: Never        Medications:    albuterol (PROVENTIL) (2.5 MG/3ML) 0.083% neb solution  Albuterol Sulfate (PROAIR HFA IN)  amiodarone (PACERONE) 200 MG tablet  digoxin (LANOXIN) 125 MCG tablet  furosemide (LASIX) 20 MG tablet  metoprolol tartrate (LOPRESSOR) 100 MG tablet  warfarin ANTICOAGULANT (COUMADIN) 5 MG tablet  gentamicin (GARAMYCIN) 0.3 % ophthalmic solution  MECLIZINE HCL PO          Review of Systems   Constitutional: Negative for activity change, appetite change, chills and fever.   Respiratory: Positive for cough. Negative for chest tightness and shortness of breath.    Cardiovascular: Negative for chest pain, palpitations and leg swelling.   Gastrointestinal: Negative for abdominal pain.   Musculoskeletal: Negative for back pain, neck pain and neck stiffness.   Skin: Negative.    Neurological: Negative for dizziness.   Hematological: Bruises/bleeds easily.       Physical Exam   BP: 170/85  Pulse: 100  Temp: 97.1  F (36.2  C)  Resp: 16  SpO2: 96 %      Physical Exam  Vitals signs and nursing note reviewed.   Constitutional:       General: She is not in acute distress.     Appearance: Normal appearance. She is normal weight. She is not ill-appearing, toxic-appearing or diaphoretic.      Comments: Pleasant and talkative 75-year-old female found seated upright on the exam chair in no distress.   Cardiovascular:      Comments: Irregularly irregular.  No tachycardia on my exam.  Pulmonary:      Effort: Pulmonary effort is normal. No respiratory distress.      Breath sounds: Normal breath sounds. No wheezing.   Chest:      Chest wall: No tenderness.   Skin:     General: Skin is warm and dry.      Capillary Refill: Capillary refill takes less  than 2 seconds.   Neurological:      General: No focal deficit present.      Mental Status: She is alert and oriented to person, place, and time.   Psychiatric:         Mood and Affect: Mood normal.         ED Course        Procedures               Critical Care time:  none               Results for orders placed or performed during the hospital encounter of 04/14/21 (from the past 24 hour(s))   Symptomatic Influenza A/B & SARS-CoV2 (COVID-19) Virus PCR Multiplex    Specimen: Nasopharyngeal   Result Value Ref Range    Flu A/B & SARS-COV-2 PCR Source Nasopharyngeal     SARS-CoV-2 PCR Result NEGATIVE     Influenza A PCR Negative NEG^Negative    Influenza B PCR Negative NEG^Negative    Respiratory Syncytial Virus PCR Negative NEG^Negative    Flu A/B & SARS-CoV-2 PCR Comment       Testing was performed using the Xpert Xpress SARS-CoV2/Flu/RSV Assay on the Cepheid   GeneXpert Instrument. Additional information about the Emergency Use Authorization (EUA)   assay can be found via the Lab Guide.     XR Chest 2 Views    Narrative    XR CHEST 2 VW    HISTORY: 75 years Female cough    COMPARISON: 2/12/2021    TECHNIQUE: 2 views of the chest were obtained.    FINDINGS: Two views of the chest were obtained. Heart size and  pulmonary vascularity are within normal limits, lungs are clear on  both views. No consolidating air space opacities are present.          Impression    IMPRESSION: Clear chest.    MARLYN PHELPS MD   CBC with platelets differential   Result Value Ref Range    WBC 6.4 4.0 - 11.0 10e9/L    RBC Count 4.48 3.8 - 5.2 10e12/L    Hemoglobin 13.7 11.7 - 15.7 g/dL    Hematocrit 41.8 35.0 - 47.0 %    MCV 93 78 - 100 fl    MCH 30.6 26.5 - 33.0 pg    MCHC 32.8 31.5 - 36.5 g/dL    RDW 14.5 10.0 - 15.0 %    Platelet Count 205 150 - 450 10e9/L    Diff Method Automated Method     % Neutrophils 58.8 %    % Lymphocytes 28.6 %    % Monocytes 6.6 %    % Eosinophils 4.9 %    % Basophils 0.6 %    % Immature Granulocytes 0.5 %     Nucleated RBCs 0 0 /100    Absolute Neutrophil 3.8 1.6 - 8.3 10e9/L    Absolute Lymphocytes 1.8 0.8 - 5.3 10e9/L    Absolute Monocytes 0.4 0.0 - 1.3 10e9/L    Absolute Eosinophils 0.3 0.0 - 0.7 10e9/L    Absolute Basophils 0.0 0.0 - 0.2 10e9/L    Abs Immature Granulocytes 0.0 0 - 0.4 10e9/L    Absolute Nucleated RBC 0.0    INR   Result Value Ref Range    INR 1.84 (H) 0.86 - 1.14       Medications - No data to display    Assessments & Plan (with Medical Decision Making)   Findings as above.  This is a smiling and talkative pleasant and well-appearing 75-year-old female who presented to the urgent care with a 3-day history of mild cough with clear production.  She has no evidence of fever or hypoxia.  She has no evidence of respiratory distress or tachypnea.  Long and exhaustive conversation with the patient.  She is mostly fixated on her medication regiment that she has been started on recently from her atrial fibrillation diagnosis.  She has no dyspnea.  She has no chest pain.  Chest x-ray shows no focal consolidation.  No leukocytosis.  Antibiotic certainly would have more risks than benefits.  Monitor the symptoms.  Follow-up in the clinic.  Return here for any worsening symptoms, new symptoms, or other concerns.  She voiced complete understanding and was happy and agreeable.    This document was prepared using a combination of typing and voice generated software.  While every attempt was made for accuracy, spelling and grammatical errors may exist.    I have reviewed the nursing notes.    I have reviewed the findings, diagnosis, plan and need for follow up with the patient.       Discharge Medication List as of 4/14/2021  8:36 PM          Final diagnoses:   Cough       4/14/2021   [facility]     Denia Rutledge PA-C  04/14/21 2045

## 2021-06-02 ENCOUNTER — OFFICE VISIT (OUTPATIENT)
Dept: CARDIOLOGY | Facility: OTHER | Age: 76
End: 2021-06-02
Attending: INTERNAL MEDICINE
Payer: COMMERCIAL

## 2021-06-02 VITALS
SYSTOLIC BLOOD PRESSURE: 136 MMHG | BODY MASS INDEX: 28.19 KG/M2 | TEMPERATURE: 98.5 F | DIASTOLIC BLOOD PRESSURE: 80 MMHG | OXYGEN SATURATION: 94 % | HEART RATE: 85 BPM | RESPIRATION RATE: 16 BRPM | WEIGHT: 180 LBS

## 2021-06-02 DIAGNOSIS — I48.91 NEW ONSET A-FIB (H): ICD-10-CM

## 2021-06-02 DIAGNOSIS — Z79.899 ON AMIODARONE THERAPY: ICD-10-CM

## 2021-06-02 DIAGNOSIS — Z87.19 H/O HIATAL HERNIA: ICD-10-CM

## 2021-06-02 DIAGNOSIS — I48.19 PERSISTENT ATRIAL FIBRILLATION (H): ICD-10-CM

## 2021-06-02 DIAGNOSIS — E83.42 HYPOMAGNESEMIA: ICD-10-CM

## 2021-06-02 DIAGNOSIS — Z79.01 ON COUMADIN FOR ATRIAL FIBRILLATION (H): Primary | ICD-10-CM

## 2021-06-02 DIAGNOSIS — I10 BENIGN ESSENTIAL HYPERTENSION: ICD-10-CM

## 2021-06-02 DIAGNOSIS — I48.91 ON COUMADIN FOR ATRIAL FIBRILLATION (H): Primary | ICD-10-CM

## 2021-06-02 DIAGNOSIS — R60.0 LOWER EXTREMITY EDEMA: ICD-10-CM

## 2021-06-02 DIAGNOSIS — R79.89 ELEVATED BRAIN NATRIURETIC PEPTIDE (BNP) LEVEL: ICD-10-CM

## 2021-06-02 DIAGNOSIS — E87.6 HYPOKALEMIA: ICD-10-CM

## 2021-06-02 DIAGNOSIS — I48.91 ATRIAL FIBRILLATION WITH RVR (H): ICD-10-CM

## 2021-06-02 DIAGNOSIS — I50.32 CHRONIC DIASTOLIC HEART FAILURE (H): ICD-10-CM

## 2021-06-02 PROCEDURE — G0463 HOSPITAL OUTPT CLINIC VISIT: HCPCS

## 2021-06-02 PROCEDURE — 99215 OFFICE O/P EST HI 40 MIN: CPT | Performed by: INTERNAL MEDICINE

## 2021-06-02 RX ORDER — IPRATROPIUM BROMIDE AND ALBUTEROL SULFATE 2.5; .5 MG/3ML; MG/3ML
3 SOLUTION RESPIRATORY (INHALATION) EVERY 4 HOURS
COMMUNITY
Start: 2021-05-11

## 2021-06-02 RX ORDER — FUROSEMIDE 40 MG
40 TABLET ORAL DAILY
COMMUNITY
Start: 2021-06-01 | End: 2021-10-04

## 2021-06-02 RX ORDER — METOPROLOL TARTRATE 100 MG
200 TABLET ORAL 2 TIMES DAILY
Qty: 260 TABLET | Refills: 3 | Status: SHIPPED | OUTPATIENT
Start: 2021-06-02 | End: 2022-02-07

## 2021-06-02 ASSESSMENT — PAIN SCALES - GENERAL: PAINLEVEL: NO PAIN (0)

## 2021-06-02 NOTE — NURSING NOTE
"Chief Complaint   Patient presents with     RECHECK     2 month follow up       Initial /80 (BP Location: Left arm, Patient Position: Sitting, Cuff Size: Adult Regular)   Pulse 85   Temp 98.5  F (36.9  C) (Tympanic)   Resp 16   Wt 81.6 kg (180 lb)   SpO2 94%   BMI 28.19 kg/m   Estimated body mass index is 28.19 kg/m  as calculated from the following:    Height as of 3/31/21: 1.702 m (5' 7\").    Weight as of this encounter: 81.6 kg (180 lb).  Medication Reconciliation: complete  Jantet Beltrán LPN    "

## 2021-06-02 NOTE — PATIENT INSTRUCTIONS
Thank you for allowing Dr. Nguyen and our  team to participate in your care. Please call our office at [phone] with scheduling questions or if you need to cancel or change your appointment. With any other questions or concerns you may call Janett cardiology nurse at [phone].       If you experience chest pain, chest pressure, chest tightness, shortness of breath, fainting, lightheadedness, nausea, vomiting, or other concerning symptoms, please report to the Emergency Department or call 911. These symptoms may be emergent, and best treated in the Emergency Department.    Follow up in 3 months.     Chest xray, eyes exam and PFT yearly basis due to amiodarone.   And labs and EKG every 6 months.     The hospital will call you to schedule a pulmonary function test to evaluate your lung function.    Metoprolol tartrate 200 mg twice per day.

## 2021-06-02 NOTE — PROGRESS NOTES
[facility] HEART CARE   CARDIOLOGY PROGRESS NOTE     Chief Complaint   Patient presents with     RECHECK     2 month follow up          Diagnosis:  1.  New onset atrial fibrillation on 2/12/2021 on amiodarone, digoxin, metoprolol, and Coumadin.  2.  H/O persistent atrial fibrillation with history of A. fib with RVR.  3.  On amiodarone.  4.  Chronic diastolic dysfunction with elevated BNP at 8292 on 2/22/2021.  5.  Lower extremity edema.  6.  H/O hiatal hernia-moderate on 2/22/2021.  7.  Left lower lobe nodule on 2/12/2011.  8.  Hypertension-uncontrolled.  9.  Hypokalemia 2.9 on 2/13/2021.  10.  Hypomagnesia on 2/24/2021 at 1.4.    Assessment/Plan:    1.  We had a long discussion about atrial fibrillation which included the heart model in the room.  We discussed her medications and potential cardioversion.  Her heart rate still runs fast presently on amiodarone 200 mg daily, digoxin 125  g a day and metoprolol 100 mg twice a day.  She gets palpitations approximately once a week but improved symptoms overall  2.  Metoprolol tartrate 100 mg twice a day was increased to metoprolol tartrate 200 mg twice a day for heart rate and blood pressure control  3.  She is currently on amiodarone 200 mg daily.  Will need labs and EKG every 6 months.  She had labs in February, 2020 and EKG on 3/31/2021.  4.  Being that she is on amiodarone, she will need a chest x-ray, eye exam, and PFT's on yearly basis.  Previously, PFT's were ordered but she had a severe cough on the day of testing.  PFT's were not completed.  PFT's will be reordered. Her last chest x-ray was on 2/12/2021.  We will plan for repeat chest x-ray in February, 2022.  She understands she needs an eye exam on a yearly basis.  5.  Follow-up in 3 months or sooner with issues.      Interval history:  Kerri is doing well overall.  She continues to have symptoms which she suspects is related to A. fib.  She was in A. fib today.  She states about once a week, she will feel  anxiety and an unsettling sensation in her chest.  She most recently had the symptoms on Saturday.  She attributes this to A. fib.  Her blood pressure has been elevated with high normal heart rates.  We discussed increasing metoprolol as outlined above.  She understands she has a chest x-ray, eye exam, PFT's on yearly basis.  She also needs labs and echo every 6 months.  She was not able to complete PFT's previously as she had a severe cough and ended up canceling the test.  These were reordered today.  She will plan for an eye exam.  Her last chest x-ray was in February.  She had labs in February.  Her EKG was in late March.  She has no other issues.      HPI:    Mrs. Larkin who has seen a doctor rarely up until being admitted to the hospital on 2/12/2021 for new onset A. fib.  She is presently on digoxin, metoprolol, Coumadin, and amiodarone for atrial fibrillation.  [id number] score is 4.     She also has a history of hypokalemia, hypomagnesia, suspected diastolic dysfunction with peripheral edema with a BNP of 8292, pneumonia on 2/12/2021, goiter on 2/12/2021, moderate hiatal hernia on 2/22/2021, arthritis in the spine, and hypertension.     She had been seen on a yearly basis for regular well check examinations.  On 2/12/2021, she presented to the ER as she had been dyspneic for a few days.  She was found to have new onset A. fib with rate of 120 bpm on her EKG.  She was admitted to the hospital.  She was provided with diltiazem IV twice with improved rates.  She was also placed on metoprolol and started on Xarelto 20 mg daily.  Hydrochlorothiazide and propranolol were discontinued.  She was discharged on metoprolol and diltiazem.     She was seen for 2nd time on 2/22/2021 in the ER.  She had worsening dyspnea and palpitations.  She was found to be in A. fib with RVR with rates in the 130-160's.  Blood pressure was in 130's systolically and saturations in the 90's on room air.  BNP was 8292.  She was given  oral diltiazem and IV metoprolol in the ER.  She was then subsequently loaded on digoxin IV and admitted to the ICU.  IV diltiazem was titrated without much effect.  She was started on amiodarone with heart rates in the 90's to 120's.  Echo from 2/22/2021 showed her low normal EF of 50-55%.  Metoprolol 50 mg tartrate twice a day was added.  Her heart rate with activity would increase to the 140-150's.  Ultimately, she was discharged on amiodarone 200 mg twice a day, then 200 mg daily, digoxin 125  g every other day, metoprolol 50 mg tartrate twice a day, and Xarelto 20 mg daily.     Heart rate is better controlled.  But he will increase with activity.  She remains dyspneic with activity but does not complain of any significant palpitations.  Xarelto was not affordable as she is on a limited income and was ultimately converted to Coumadin.     Was also mention that she was noted have a goiter, moderate hiatal hernia, left lower lobe nodule and arthritis in the spine on 2/12/2021.      Relevant testing:  Zio patch in 4/8/2021:  Underlying rhythm was atrial fibrillation.  Hrt rate ranged from 40 bpm, maximum heart rate of 135 bmp, averaging 79 bmp.  No significant bradycardia, 2nd degree Mobitz type II or 3rd degree heart block.  x1 pause lasting 3.0 sec's at 6:12 AM on 4/9/2021  + atrial fibrillation with 100% burden.  x10 triggered events and x9 diary entries.  These corresponded to atrial fibrillation and VE.  x0 runs of VT.  x0 runs of SVT.  Rare, less than 1% of PVC's, ventricular couplets, and ventricular triplets.  0 episodes of ventricular bigeminy.  + episodes of ventricular trigeminy lasting up to 2.6 sec's.    Echocardiogram in 2/22/2021:  No pericardial effusion is present.  Borderline (EF 50-55%) reduced left ventricular function is present.  Diastolic function not assessed due to atrial fibrillation.  The right ventricle is normal size.  Mild left atrial enlargement is present.  Mild to moderate mitral  insufficiency is present.  The valve leaflets are not well visualized.  Mild to moderate aortic insufficiency is present.  Mild to moderate tricuspid insufficiency is present.  Trace to mild pulmonic insufficiency is present.  The aorta root is normal.    CTA chest on 2/12/2021:  Right upper lobe and right middle lobe infiltrate  suspicious for pneumonia.     substernal goiter deviating the trachea from left to right.     11 mm in diameter nodule in the left lower lobe.     No pulmonary emboli.        ICD-10-CM    1. On Coumadin for atrial fibrillation (H)  I48.91     Z79.01    2. New onset a-fib on 2/12/2021 (H)  I48.91 metoprolol tartrate (LOPRESSOR) 100 MG tablet   3. Atrial fibrillation with RVR (H)  I48.91 metoprolol tartrate (LOPRESSOR) 100 MG tablet   4. Persistent atrial fibrillation (H)  I48.19 metoprolol tartrate (LOPRESSOR) 100 MG tablet   5. Benign essential hypertension  I10 metoprolol tartrate (LOPRESSOR) 100 MG tablet   6. Lower extremity edema  R60.0    7. On amiodarone therapy  Z79.899 Pulmonary Function Test   8. Chronic diastolic heart failure (H)  I50.32    9. Elevated brain natriuretic peptide (BNP) level  R79.89    10. H/O hiatal hernia-moderate on 2/22/2021  Z87.19    11. Hypokalemia  E87.6    12. Hypomagnesemia  E83.42        History reviewed. No pertinent past medical history.    Past Surgical History:   Procedure Laterality Date     APPENDECTOMY       AS REMOVAL OF OVARY/TUBE(S) N/A        Allergies   Allergen Reactions     Aspirin      Heart palpitations       Current Outpatient Medications   Medication Sig Dispense Refill     albuterol (PROVENTIL) (2.5 MG/3ML) 0.083% neb solution Take 2.5 mg by nebulization every 6 hours as needed for shortness of breath / dyspnea or wheezing       amiodarone (PACERONE) 200 MG tablet Take 1 tablet (200 mg) by mouth daily 90 tablet 3     digoxin (LANOXIN) 125 MCG tablet Take 1 tablet (125 mcg) by mouth every 48 hours 30 tablet 3     furosemide (LASIX) 40  MG tablet Take 40 mg by mouth daily       gentamicin (GARAMYCIN) 0.3 % ophthalmic solution INSTILL 2 DROPS INTO EACH EYE EVERY 4 HOURS AS NEEDED FOR DRY EYES       ipratropium - albuterol 0.5 mg/2.5 mg/3 mL (DUONEB) 0.5-2.5 (3) MG/3ML neb solution Take 3 mLs by nebulization as needed       MECLIZINE HCL PO Take 25 mg by mouth as needed for dizziness       metoprolol tartrate (LOPRESSOR) 100 MG tablet Take 2 tablets (200 mg) by mouth 2 times daily 260 tablet 3     warfarin ANTICOAGULANT (COUMADIN) 5 MG tablet Take 5 mg by mouth daily Pt takes 2.5 mg at this time         Social History     Socioeconomic History     Marital status:      Spouse name: Not on file     Number of children: Not on file     Years of education: Not on file     Highest education level: Not on file   Occupational History     Not on file   Social Needs     Financial resource strain: Not on file     Food insecurity     Worry: Not on file     Inability: Not on file     Transportation needs     Medical: Not on file     Non-medical: Not on file   Tobacco Use     Smoking status: Never Smoker     Smokeless tobacco: Never Used   Substance and Sexual Activity     Alcohol use: Not Currently     Drug use: Never     Sexual activity: Not on file   Lifestyle     Physical activity     Days per week: Not on file     Minutes per session: Not on file     Stress: Not on file   Relationships     Social connections     Talks on phone: Not on file     Gets together: Not on file     Attends Jainism service: Not on file     Active member of club or organization: Not on file     Attends meetings of clubs or organizations: Not on file     Relationship status: Not on file     Intimate partner violence     Fear of current or ex partner: Not on file     Emotionally abused: Not on file     Physically abused: Not on file     Forced sexual activity: Not on file   Other Topics Concern     Not on file   Social History Narrative     Not on file       LAB RESULTS:   No  visits with results within 2 Month(s) from this visit.   Latest known visit with results is:   No results found for any previous visit.        Review of systems: Negative except that which was noted in the HPI.    Physical examination:  /80 (BP Location: Left arm, Patient Position: Sitting, Cuff Size: Adult Regular)   Pulse 85   Temp 98.5  F (36.9  C) (Tympanic)   Resp 16   Wt 81.6 kg (180 lb)   SpO2 94%   BMI 28.19 kg/m      GENERAL APPEARANCE: healthy, alert and no distress  HEENT: no icterus, no xanthelasmas, normal pupil size and reaction, no cyanosis.  NECK: no adenopathy, no asymmetry, masses.  CHEST: lungs clear to auscultation - no rales, rhonchi or wheezes, no use of accessory muscles, no retractions, respirations are unlabored, normal respiratory rate  CARDIOVASCULAR: regular rhythm, normal S1 with physiologic split S2, no S3 or S4 and no murmur, click or rub  EXTREMITIES: no clubbing, cyanosis or edema  NEURO: alert and oriented normal speech, and affect  VASC: No vascular bruits heard.  SKIN: no ecchymoses, no rashes        Thank you for allowing me to participate in the care of your patient. Please do not hesitate to contact me if you have any questions.     Abel Nguyen, DO

## 2021-09-15 ENCOUNTER — OFFICE VISIT (OUTPATIENT)
Dept: CARDIOLOGY | Facility: OTHER | Age: 76
End: 2021-09-15
Attending: INTERNAL MEDICINE
Payer: COMMERCIAL

## 2021-09-15 VITALS
WEIGHT: 172 LBS | HEART RATE: 82 BPM | DIASTOLIC BLOOD PRESSURE: 80 MMHG | SYSTOLIC BLOOD PRESSURE: 156 MMHG | HEIGHT: 67 IN | BODY MASS INDEX: 27 KG/M2 | TEMPERATURE: 98.6 F

## 2021-09-15 DIAGNOSIS — I48.91 NEW ONSET A-FIB (H): ICD-10-CM

## 2021-09-15 DIAGNOSIS — R91.1 LEFT LOWER LOBE PULMONARY NODULE: ICD-10-CM

## 2021-09-15 DIAGNOSIS — Z79.899 ON AMIODARONE THERAPY: ICD-10-CM

## 2021-09-15 DIAGNOSIS — I48.91 ON COUMADIN FOR ATRIAL FIBRILLATION (H): ICD-10-CM

## 2021-09-15 DIAGNOSIS — R79.89 ELEVATED BRAIN NATRIURETIC PEPTIDE (BNP) LEVEL: ICD-10-CM

## 2021-09-15 DIAGNOSIS — I48.19 PERSISTENT ATRIAL FIBRILLATION (H): Primary | ICD-10-CM

## 2021-09-15 DIAGNOSIS — Z79.01 ON COUMADIN FOR ATRIAL FIBRILLATION (H): ICD-10-CM

## 2021-09-15 DIAGNOSIS — Z87.19 H/O HIATAL HERNIA: ICD-10-CM

## 2021-09-15 DIAGNOSIS — R60.0 LOWER EXTREMITY EDEMA: ICD-10-CM

## 2021-09-15 DIAGNOSIS — I50.32 CHRONIC DIASTOLIC HEART FAILURE (H): ICD-10-CM

## 2021-09-15 DIAGNOSIS — I48.91 ATRIAL FIBRILLATION WITH RVR (H): ICD-10-CM

## 2021-09-15 DIAGNOSIS — E83.42 HYPOMAGNESEMIA: ICD-10-CM

## 2021-09-15 DIAGNOSIS — I10 BENIGN ESSENTIAL HYPERTENSION: ICD-10-CM

## 2021-09-15 PROCEDURE — G0463 HOSPITAL OUTPT CLINIC VISIT: HCPCS

## 2021-09-15 PROCEDURE — 99214 OFFICE O/P EST MOD 30 MIN: CPT | Performed by: INTERNAL MEDICINE

## 2021-09-15 RX ORDER — LOSARTAN POTASSIUM 25 MG/1
25 TABLET ORAL DAILY
Qty: 90 TABLET | Refills: 3 | Status: SHIPPED | OUTPATIENT
Start: 2021-09-15 | End: 2022-03-16

## 2021-09-15 ASSESSMENT — ANXIETY QUESTIONNAIRES
GAD7 TOTAL SCORE: 0
1. FEELING NERVOUS, ANXIOUS, OR ON EDGE: NOT AT ALL
3. WORRYING TOO MUCH ABOUT DIFFERENT THINGS: NOT AT ALL
5. BEING SO RESTLESS THAT IT IS HARD TO SIT STILL: NOT AT ALL
2. NOT BEING ABLE TO STOP OR CONTROL WORRYING: NOT AT ALL
7. FEELING AFRAID AS IF SOMETHING AWFUL MIGHT HAPPEN: NOT AT ALL
6. BECOMING EASILY ANNOYED OR IRRITABLE: NOT AT ALL
4. TROUBLE RELAXING: NOT AT ALL

## 2021-09-15 ASSESSMENT — PATIENT HEALTH QUESTIONNAIRE - PHQ9: SUM OF ALL RESPONSES TO PHQ QUESTIONS 1-9: 0

## 2021-09-15 ASSESSMENT — PAIN SCALES - GENERAL: PAINLEVEL: NO PAIN (0)

## 2021-09-15 ASSESSMENT — MIFFLIN-ST. JEOR: SCORE: 1292.82

## 2021-09-15 NOTE — NURSING NOTE
"Chief Complaint   Patient presents with     RECHECK       Initial BP (!) 156/80   Pulse 82   Temp 98.6  F (37  C)   Ht 1.702 m (5' 7\")   Wt 78 kg (172 lb)   BMI 26.94 kg/m   Estimated body mass index is 26.94 kg/m  as calculated from the following:    Height as of this encounter: 1.702 m (5' 7\").    Weight as of this encounter: 78 kg (172 lb).  Medication Reconciliation: complete  Mauro Vaca    "

## 2021-09-15 NOTE — PROGRESS NOTES
[facility] HEART CARE   CARDIOLOGY PROGRESS NOTE     Chief Complaint   Patient presents with     RECHECK          Diagnosis:  1.  New onset atrial fibrillation on 2/12/2021 on amiodarone, digoxin, metoprolol, and Coumadin.  2.  H/O persistent atrial fibrillation with history of A. fib with RVR.  3.  On amiodarone.  4.  Chronic diastolic dysfunction with elevated BNP at 8292 on 2/22/2021.  5.  Lower extremity edema.  6.  H/O hiatal hernia-moderate on 2/22/2021.  7.  Left lower lobe nodule on 2/12/2011.  8.  Hypertension-uncontrolled.  9.  Hypokalemia 2.9 on 2/13/2021.  10.  Hypomagnesia on 2/24/2021 at 1.4.    Assessment/Plan:    1.  We had a long discussion about atrial fibrillation which included the heart model in the room.  We discussed her medications and potential cardioversion.  Metoprolol doubled.  Patient feeling better.    2.  Started on losartan 25 mg for hypertension.  3.  PFTs, chest x-ray, eye exam on a yearly basis while on amiodarone.  Chest x-ray on 4/14/2021.  Patient plans to get an eye exam.  PFTs ordered, none on file.  4.  Labs and EKG every 6 months while on amiodarone.  EKG on 3/31/2021.  Labs on 2/26/2021.  5.  Follow-up in 6 months or sooner if issues.        Interval history:  Kerri is stable from atrial fibrillation standpoint.  She is tolerating amiodarone, oxygen, metoprolol, and Coumadin.  At her last visit, metoprolol was increased from 100 mg twice a day to 200 mg twice a day.  She has seen substantial benefit from these changes.  She has had less palpitations.  She still has palpitations but describes them as minimal.  She is doing well on Coumadin.  She has not had any significant bleeding.  She will need labs and EKG every 6 months.  PFTs, echo and eye exam every year.  Her blood pressure is elevated, 156/80.  Start on losartan 25 mg daily.  She has not had significant edema.  She has no other issues or complaints.      HPI:    Mrs. Larkin who has seen a doctor rarely up until being  admitted to the hospital on 2/12/2021 for new onset A. fib.  She is presently on digoxin, metoprolol, Coumadin, and amiodarone for atrial fibrillation.  [id number] score is 4.     She also has a history of hypokalemia, hypomagnesia, suspected diastolic dysfunction with peripheral edema with a BNP of 8292, pneumonia on 2/12/2021, goiter on 2/12/2021, moderate hiatal hernia on 2/22/2021, arthritis in the spine, and hypertension.     She had been seen on a yearly basis for regular well check examinations.  On 2/12/2021, she presented to the ER as she had been dyspneic for a few days.  She was found to have new onset A. fib with rate of 120 bpm on her EKG.  She was admitted to the hospital.  She was provided with diltiazem IV twice with improved rates.  She was also placed on metoprolol and started on Xarelto 20 mg daily.  Hydrochlorothiazide and propranolol were discontinued.  She was discharged on metoprolol and diltiazem.     She was seen for 2nd time on 2/22/2021 in the ER.  She had worsening dyspnea and palpitations.  She was found to be in A. fib with RVR with rates in the 130-160's.  Blood pressure was in 130's systolically and saturations in the 90's on room air.  BNP was 8292.  She was given oral diltiazem and IV metoprolol in the ER.  She was then subsequently loaded on digoxin IV and admitted to the ICU.  IV diltiazem was titrated without much effect.  She was started on amiodarone with heart rates in the 90's to 120's.  Echo from 2/22/2021 showed her low normal EF of 50-55%.  Metoprolol 50 mg tartrate twice a day was added.  Her heart rate with activity would increase to the 140-150's.  Ultimately, she was discharged on amiodarone 200 mg twice a day, then 200 mg daily, digoxin 125  g every other day, metoprolol 50 mg tartrate twice a day, and Xarelto 20 mg daily.     Heart rate is better controlled.  But he will increase with activity.  She remains dyspneic with activity but does not complain of any  significant palpitations.  Xarelto was not affordable as she is on a limited income and was ultimately converted to Coumadin.     Was also mention that she was noted have a goiter, moderate hiatal hernia, left lower lobe nodule and arthritis in the spine on 2/12/2021.      Relevant testing:  Zio patch in 4/8/2021:  Underlying rhythm was atrial fibrillation.  Hrt rate ranged from 40 bpm, maximum heart rate of 135 bmp, averaging 79 bmp.  No significant bradycardia, 2nd degree Mobitz type II or 3rd degree heart block.  x1 pause lasting 3.0 sec's at 6:12 AM on 4/9/2021  + atrial fibrillation with 100% burden.  x10 triggered events and x9 diary entries.  These corresponded to atrial fibrillation and VE.  x0 runs of VT.  x0 runs of SVT.  Rare, less than 1% of PVC's, ventricular couplets, and ventricular triplets.  0 episodes of ventricular bigeminy.  + episodes of ventricular trigeminy lasting up to 2.6 sec's.    Echocardiogram in 2/22/2021:  No pericardial effusion is present.  Borderline (EF 50-55%) reduced left ventricular function is present.  Diastolic function not assessed due to atrial fibrillation.  The right ventricle is normal size.  Mild left atrial enlargement is present.  Mild to moderate mitral insufficiency is present.  The valve leaflets are not well visualized.  Mild to moderate aortic insufficiency is present.  Mild to moderate tricuspid insufficiency is present.  Trace to mild pulmonic insufficiency is present.  The aorta root is normal.    CTA chest on 2/12/2021:  Right upper lobe and right middle lobe infiltrate  suspicious for pneumonia.     substernal goiter deviating the trachea from left to right.     11 mm in diameter nodule in the left lower lobe.     No pulmonary emboli.        ICD-10-CM    1. Persistent atrial fibrillation (H)  I48.19 Pulmonary Function Test   2. On Coumadin for atrial fibrillation (H)  I48.91     Z79.01    3. New onset a-fib on 2/12/21 (H)  I48.91    4. Chronic diastolic  heart failure (H)  I50.32 losartan (COZAAR) 25 MG tablet   5. Benign essential hypertension  I10 losartan (COZAAR) 25 MG tablet   6. Atrial fibrillation with RVR (H)  I48.91    7. Left lower lobe pulmonary nodule on 2/12/2021  R91.1    8. Hypomagnesemia  E83.42    9. Lower extremity edema  R60.0    10. On amiodarone therapy  Z79.899 Pulmonary Function Test   11. H/O hiatal hernia-moderate on 2/22/2021  Z87.19    12. Elevated brain natriuretic peptide (BNP) level  R79.89        No past medical history on file.    Past Surgical History:   Procedure Laterality Date     APPENDECTOMY       AS REMOVAL OF OVARY/TUBE(S) N/A        Allergies   Allergen Reactions     Aspirin      Heart palpitations       Current Outpatient Medications   Medication Sig Dispense Refill     losartan (COZAAR) 25 MG tablet Take 1 tablet (25 mg) by mouth daily 90 tablet 3     albuterol (PROVENTIL) (2.5 MG/3ML) 0.083% neb solution Take 2.5 mg by nebulization every 6 hours as needed for shortness of breath / dyspnea or wheezing       amiodarone (PACERONE) 200 MG tablet Take 1 tablet (200 mg) by mouth daily 90 tablet 3     digoxin (LANOXIN) 125 MCG tablet Take 1 tablet (125 mcg) by mouth every 48 hours 30 tablet 3     furosemide (LASIX) 40 MG tablet Take 40 mg by mouth daily       gentamicin (GARAMYCIN) 0.3 % ophthalmic solution INSTILL 2 DROPS INTO EACH EYE EVERY 4 HOURS AS NEEDED FOR DRY EYES       ipratropium - albuterol 0.5 mg/2.5 mg/3 mL (DUONEB) 0.5-2.5 (3) MG/3ML neb solution Take 3 mLs by nebulization as needed       MECLIZINE HCL PO Take 25 mg by mouth as needed for dizziness       metoprolol tartrate (LOPRESSOR) 100 MG tablet Take 2 tablets (200 mg) by mouth 2 times daily 260 tablet 3     warfarin ANTICOAGULANT (COUMADIN) 5 MG tablet Take 5 mg by mouth daily Pt takes 2.5 mg at this time         Social History     Socioeconomic History     Marital status:      Spouse name: Not on file     Number of children: Not on file     Years of  "education: Not on file     Highest education level: Not on file   Occupational History     Not on file   Tobacco Use     Smoking status: Never Smoker     Smokeless tobacco: Never Used   Substance and Sexual Activity     Alcohol use: Not Currently     Drug use: Never     Sexual activity: Not on file   Other Topics Concern     Not on file   Social History Narrative     Not on file     Social Determinants of Health     Financial Resource Strain:      Difficulty of Paying Living Expenses:    Food Insecurity:      Worried About Running Out of Food in the Last Year:      Ran Out of Food in the Last Year:    Transportation Needs:      Lack of Transportation (Medical):      Lack of Transportation (Non-Medical):    Physical Activity:      Days of Exercise per Week:      Minutes of Exercise per Session:    Stress:      Feeling of Stress :    Social Connections:      Frequency of Communication with Friends and Family:      Frequency of Social Gatherings with Friends and Family:      Attends Pentecostal Services:      Active Member of Clubs or Organizations:      Attends Club or Organization Meetings:      Marital Status:    Intimate Partner Violence:      Fear of Current or Ex-Partner:      Emotionally Abused:      Physically Abused:      Sexually Abused:        LAB RESULTS:   No visits with results within 2 Month(s) from this visit.   Latest known visit with results is:   No results found for any previous visit.        Review of systems: Negative except that which was noted in the HPI.    Physical examination:  BP (!) 156/80   Pulse 82   Temp 98.6  F (37  C)   Ht 1.702 m (5' 7\")   Wt 78 kg (172 lb)   BMI 26.94 kg/m      GENERAL APPEARANCE: healthy, alert and no distress  HEENT: no icterus, no xanthelasmas, normal pupil size and reaction, no cyanosis.  NECK: no adenopathy, no asymmetry, masses.  CHEST: lungs clear to auscultation - no rales, rhonchi or wheezes, no use of accessory muscles, no retractions, respirations are " unlabored, normal respiratory rate  CARDIOVASCULAR: Regular rate irregular rhythm normal S1 with physiologic split S2, no S3 or S4 and no murmur, click or rub  EXTREMITIES: no clubbing, cyanosis but with mild peripheral edema  NEURO: alert and oriented normal speech, and affect  VASC: No vascular bruits heard.  SKIN: no ecchymoses, no rashes        Thank you for allowing me to participate in the care of your patient. Please do not hesitate to contact me if you have any questions.     Abel Nguyen, DO

## 2021-09-15 NOTE — PATIENT INSTRUCTIONS
Thank you for allowing Dr. Nguyen and our  team to participate in your care. Please call our office at [phone] with scheduling questions or if you need to cancel or change your appointment. With any other questions or concerns you may call [facility] cardiology nurse at [phone].       If you experience chest pain, chest pressure, chest tightness, shortness of breath, fainting, lightheadedness, nausea, vomiting, or other concerning symptoms, please report to the Emergency Department or call 911. These symptoms may be emergent, and best treated in the Emergency Department.    Follow up in 6 months    Losartan 25 mg once daily.     Please make sure you get your yearly eye exam.     The hospital will call you to schedule a pulmonary function test to evaluate your lung function.

## 2021-09-16 ASSESSMENT — ANXIETY QUESTIONNAIRES: GAD7 TOTAL SCORE: 0

## 2021-09-30 ENCOUNTER — LAB (OUTPATIENT)
Dept: LAB | Facility: OTHER | Age: 76
End: 2021-09-30
Payer: MEDICARE

## 2021-09-30 DIAGNOSIS — R60.0 LOWER EXTREMITY EDEMA: ICD-10-CM

## 2021-09-30 DIAGNOSIS — Z13.220 LIPID SCREENING: ICD-10-CM

## 2021-09-30 DIAGNOSIS — I48.91 ON COUMADIN FOR ATRIAL FIBRILLATION (H): ICD-10-CM

## 2021-09-30 DIAGNOSIS — I48.19 PERSISTENT ATRIAL FIBRILLATION (H): ICD-10-CM

## 2021-09-30 DIAGNOSIS — R73.9 HYPERGLYCEMIA: ICD-10-CM

## 2021-09-30 DIAGNOSIS — E83.42 HYPOMAGNESEMIA: ICD-10-CM

## 2021-09-30 DIAGNOSIS — Z79.899 ON AMIODARONE THERAPY: ICD-10-CM

## 2021-09-30 DIAGNOSIS — I48.91 ATRIAL FIBRILLATION WITH RVR (H): ICD-10-CM

## 2021-09-30 DIAGNOSIS — I50.32 CHRONIC DIASTOLIC HEART FAILURE (H): ICD-10-CM

## 2021-09-30 DIAGNOSIS — I48.91 NEW ONSET A-FIB (H): ICD-10-CM

## 2021-09-30 DIAGNOSIS — E87.6 HYPOKALEMIA: ICD-10-CM

## 2021-09-30 DIAGNOSIS — Z79.01 ON COUMADIN FOR ATRIAL FIBRILLATION (H): ICD-10-CM

## 2021-09-30 LAB
ALBUMIN SERPL-MCNC: 3.8 G/DL (ref 3.4–5)
ALP SERPL-CCNC: 70 U/L (ref 40–150)
ALT SERPL W P-5'-P-CCNC: 34 U/L (ref 0–50)
ANION GAP SERPL CALCULATED.3IONS-SCNC: 4 MMOL/L (ref 3–14)
AST SERPL W P-5'-P-CCNC: 29 U/L (ref 0–45)
BILIRUB SERPL-MCNC: 0.8 MG/DL (ref 0.2–1.3)
BUN SERPL-MCNC: 19 MG/DL (ref 7–30)
CALCIUM SERPL-MCNC: 8.8 MG/DL (ref 8.5–10.1)
CHLORIDE BLD-SCNC: 100 MMOL/L (ref 94–109)
CHOLEST SERPL-MCNC: 180 MG/DL
CO2 SERPL-SCNC: 34 MMOL/L (ref 20–32)
CREAT SERPL-MCNC: 1.11 MG/DL (ref 0.52–1.04)
ERYTHROCYTE [DISTWIDTH] IN BLOOD BY AUTOMATED COUNT: 13.8 % (ref 10–15)
EST. AVERAGE GLUCOSE BLD GHB EST-MCNC: 134 MG/DL
FASTING STATUS PATIENT QL REPORTED: YES
GFR SERPL CREATININE-BSD FRML MDRD: 48 ML/MIN/1.73M2
GLUCOSE BLD-MCNC: 131 MG/DL (ref 70–99)
HBA1C MFR BLD: 6.3 % (ref 0–5.6)
HCT VFR BLD AUTO: 40.9 % (ref 35–47)
HDLC SERPL-MCNC: 56 MG/DL
HGB BLD-MCNC: 13.6 G/DL (ref 11.7–15.7)
LDLC SERPL CALC-MCNC: 103 MG/DL
MAGNESIUM SERPL-MCNC: 1.9 MG/DL (ref 1.6–2.3)
MCH RBC QN AUTO: 30.8 PG (ref 26.5–33)
MCHC RBC AUTO-ENTMCNC: 33.3 G/DL (ref 31.5–36.5)
MCV RBC AUTO: 93 FL (ref 78–100)
NONHDLC SERPL-MCNC: 124 MG/DL
NT-PROBNP SERPL-MCNC: 2524 PG/ML (ref 0–450)
PLATELET # BLD AUTO: 200 10E3/UL (ref 150–450)
POTASSIUM BLD-SCNC: 3.6 MMOL/L (ref 3.4–5.3)
PROT SERPL-MCNC: 7.9 G/DL (ref 6.8–8.8)
RBC # BLD AUTO: 4.41 10E6/UL (ref 3.8–5.2)
SODIUM SERPL-SCNC: 138 MMOL/L (ref 133–144)
T4 FREE SERPL-MCNC: 2.21 NG/DL (ref 0.76–1.46)
TRIGL SERPL-MCNC: 106 MG/DL
TSH SERPL DL<=0.005 MIU/L-ACNC: <0.01 MU/L (ref 0.4–4)
WBC # BLD AUTO: 4 10E3/UL (ref 4–11)

## 2021-09-30 PROCEDURE — 80061 LIPID PANEL: CPT | Mod: ZL

## 2021-09-30 PROCEDURE — 84439 ASSAY OF FREE THYROXINE: CPT | Mod: ZL

## 2021-09-30 PROCEDURE — 85027 COMPLETE CBC AUTOMATED: CPT | Mod: ZL

## 2021-09-30 PROCEDURE — 83036 HEMOGLOBIN GLYCOSYLATED A1C: CPT | Mod: ZL

## 2021-09-30 PROCEDURE — 84443 ASSAY THYROID STIM HORMONE: CPT | Mod: ZL

## 2021-09-30 PROCEDURE — 83735 ASSAY OF MAGNESIUM: CPT | Mod: ZL

## 2021-09-30 PROCEDURE — 36415 COLL VENOUS BLD VENIPUNCTURE: CPT | Mod: ZL

## 2021-09-30 PROCEDURE — 80053 COMPREHEN METABOLIC PANEL: CPT | Mod: ZL

## 2021-09-30 PROCEDURE — 83880 ASSAY OF NATRIURETIC PEPTIDE: CPT | Mod: ZL

## 2021-10-04 DIAGNOSIS — Z79.899 ON AMIODARONE THERAPY: ICD-10-CM

## 2021-10-04 DIAGNOSIS — R60.0 LOWER EXTREMITY EDEMA: ICD-10-CM

## 2021-10-04 DIAGNOSIS — R73.03 PREDIABETES: ICD-10-CM

## 2021-10-04 DIAGNOSIS — E05.90 HYPERTHYROIDISM: Primary | ICD-10-CM

## 2021-10-04 DIAGNOSIS — R79.89 ELEVATED BRAIN NATRIURETIC PEPTIDE (BNP) LEVEL: ICD-10-CM

## 2021-10-04 DIAGNOSIS — E87.6 HYPOKALEMIA: ICD-10-CM

## 2021-10-04 DIAGNOSIS — E04.9 GOITER: ICD-10-CM

## 2021-10-04 DIAGNOSIS — I50.32 CHRONIC DIASTOLIC HEART FAILURE (H): ICD-10-CM

## 2021-10-04 RX ORDER — FUROSEMIDE 20 MG
20 TABLET ORAL DAILY
Qty: 90 TABLET | Refills: 3 | Status: SHIPPED | OUTPATIENT
Start: 2021-10-04 | End: 2022-03-16

## 2021-12-08 ENCOUNTER — LAB (OUTPATIENT)
Dept: LAB | Facility: OTHER | Age: 76
End: 2021-12-08
Payer: MEDICARE

## 2021-12-08 DIAGNOSIS — R60.0 LOWER EXTREMITY EDEMA: ICD-10-CM

## 2021-12-08 DIAGNOSIS — E05.90 HYPERTHYROIDISM: ICD-10-CM

## 2021-12-08 DIAGNOSIS — R73.03 PREDIABETES: ICD-10-CM

## 2021-12-08 DIAGNOSIS — Z79.899 ON AMIODARONE THERAPY: ICD-10-CM

## 2021-12-08 DIAGNOSIS — R79.89 ELEVATED BRAIN NATRIURETIC PEPTIDE (BNP) LEVEL: ICD-10-CM

## 2021-12-08 DIAGNOSIS — E87.6 HYPOKALEMIA: ICD-10-CM

## 2021-12-08 DIAGNOSIS — I50.32 CHRONIC DIASTOLIC HEART FAILURE (H): ICD-10-CM

## 2021-12-08 LAB
ANION GAP SERPL CALCULATED.3IONS-SCNC: 4 MMOL/L (ref 3–14)
BUN SERPL-MCNC: 16 MG/DL (ref 7–30)
CALCIUM SERPL-MCNC: 9.4 MG/DL (ref 8.5–10.1)
CHLORIDE BLD-SCNC: 103 MMOL/L (ref 94–109)
CO2 SERPL-SCNC: 32 MMOL/L (ref 20–32)
CREAT SERPL-MCNC: 1.01 MG/DL (ref 0.52–1.04)
EST. AVERAGE GLUCOSE BLD GHB EST-MCNC: 128 MG/DL
GFR SERPL CREATININE-BSD FRML MDRD: 53 ML/MIN/1.73M2
GLUCOSE BLD-MCNC: 138 MG/DL (ref 70–99)
HBA1C MFR BLD: 6.1 % (ref 0–5.6)
NT-PROBNP SERPL-MCNC: 3608 PG/ML (ref 0–450)
POTASSIUM BLD-SCNC: 3.9 MMOL/L (ref 3.4–5.3)
SODIUM SERPL-SCNC: 139 MMOL/L (ref 133–144)
T4 FREE SERPL-MCNC: 2.29 NG/DL (ref 0.76–1.46)
TSH SERPL DL<=0.005 MIU/L-ACNC: <0.01 MU/L (ref 0.4–4)

## 2021-12-08 PROCEDURE — 80048 BASIC METABOLIC PNL TOTAL CA: CPT | Mod: ZL

## 2021-12-08 PROCEDURE — 84439 ASSAY OF FREE THYROXINE: CPT | Mod: ZL

## 2021-12-08 PROCEDURE — 83880 ASSAY OF NATRIURETIC PEPTIDE: CPT | Mod: ZL

## 2021-12-08 PROCEDURE — 36415 COLL VENOUS BLD VENIPUNCTURE: CPT | Mod: ZL

## 2021-12-08 PROCEDURE — 83036 HEMOGLOBIN GLYCOSYLATED A1C: CPT | Mod: ZL

## 2021-12-08 PROCEDURE — 84443 ASSAY THYROID STIM HORMONE: CPT | Mod: ZL

## 2021-12-13 DIAGNOSIS — Z79.01 ON COUMADIN FOR ATRIAL FIBRILLATION (H): ICD-10-CM

## 2021-12-13 DIAGNOSIS — I48.91 ON COUMADIN FOR ATRIAL FIBRILLATION (H): ICD-10-CM

## 2021-12-13 DIAGNOSIS — R60.0 LOWER EXTREMITY EDEMA: ICD-10-CM

## 2021-12-13 DIAGNOSIS — E04.9 GOITER: Primary | ICD-10-CM

## 2021-12-13 DIAGNOSIS — E83.42 HYPOMAGNESEMIA: ICD-10-CM

## 2021-12-13 DIAGNOSIS — Z79.899 ON AMIODARONE THERAPY: ICD-10-CM

## 2021-12-13 DIAGNOSIS — E05.90 HYPERTHYROIDISM: ICD-10-CM

## 2021-12-13 DIAGNOSIS — E87.6 HYPOKALEMIA: ICD-10-CM

## 2021-12-13 DIAGNOSIS — I50.32 CHRONIC DIASTOLIC HEART FAILURE (H): ICD-10-CM

## 2022-02-07 DIAGNOSIS — I48.91 ON COUMADIN FOR ATRIAL FIBRILLATION (H): Primary | ICD-10-CM

## 2022-02-07 DIAGNOSIS — I10 BENIGN ESSENTIAL HYPERTENSION: ICD-10-CM

## 2022-02-07 DIAGNOSIS — I48.91 NEW ONSET A-FIB (H): ICD-10-CM

## 2022-02-07 DIAGNOSIS — Z79.01 ON COUMADIN FOR ATRIAL FIBRILLATION (H): Primary | ICD-10-CM

## 2022-02-07 DIAGNOSIS — I48.19 PERSISTENT ATRIAL FIBRILLATION (H): ICD-10-CM

## 2022-02-07 DIAGNOSIS — I48.91 ATRIAL FIBRILLATION WITH RVR (H): ICD-10-CM

## 2022-02-07 RX ORDER — METOPROLOL TARTRATE 100 MG
200 TABLET ORAL 2 TIMES DAILY
Qty: 360 TABLET | Refills: 3 | Status: SHIPPED | OUTPATIENT
Start: 2022-02-07 | End: 2023-02-06

## 2022-02-07 NOTE — TELEPHONE ENCOUNTER
" Disp Refills Start End TRENA   metoprolol tartrate (LOPRESSOR) 100 MG tablet 260 tablet 3 6/2/2021  No   Sig - Route: Take 2 tablets (200 mg) by mouth 2 times daily - Oral       LOV: 9/15/2021  Future Office visit:    Next 5 appointments (look out 90 days)    Mar 16, 2022  2:00 PM  (Arrive by 1:45 PM)  Return Visit with Abel Nguyen DO  [facility] ([facility] ) [address]  [facility] [address]  [phone]        Routing refill request to provider for review/approval because:  Blood pressure out of range   Failed protocol  Upcoming appointment.    Requested Prescriptions   Pending Prescriptions Disp Refills     metoprolol tartrate (LOPRESSOR) 100 MG tablet [Pharmacy Med Name: Metoprolol Tartrate 100 MG Oral Tablet] 260 tablet 0     Sig: Take 2 tablets by mouth twice daily       Beta-Blockers Protocol Failed - 2/7/2022 12:46 PM        Failed - Blood pressure under 140/90 in past 12 months     BP Readings from Last 3 Encounters:   09/15/21 (!) 156/80   06/02/21 136/80   04/14/21 170/85                 Passed - Patient is age 6 or older        Passed - Recent (12 mo) or future (30 days) visit within the authorizing provider's specialty     Patient has had an office visit with the authorizing provider or a provider within the authorizing providers department within the previous 12 mos or has a future within next 30 days. See \"Patient Info\" tab in inbasket, or \"Choose Columns\" in Meds & Orders section of the refill encounter.              Passed - Medication is active on med list         Unable to complete prescription refill per RN Medication Refill Policy.................... Kirstie Prajapati RN ....................  2/7/2022   1:49 PM        "

## 2022-02-15 ENCOUNTER — TRANSFERRED RECORDS (OUTPATIENT)
Dept: HEALTH INFORMATION MANAGEMENT | Facility: OTHER | Age: 77
End: 2022-02-15

## 2022-03-16 ENCOUNTER — OFFICE VISIT (OUTPATIENT)
Dept: CARDIOLOGY | Facility: OTHER | Age: 77
End: 2022-03-16
Attending: INTERNAL MEDICINE
Payer: COMMERCIAL

## 2022-03-16 VITALS
SYSTOLIC BLOOD PRESSURE: 164 MMHG | OXYGEN SATURATION: 95 % | DIASTOLIC BLOOD PRESSURE: 73 MMHG | RESPIRATION RATE: 16 BRPM | HEIGHT: 67 IN | BODY MASS INDEX: 26.21 KG/M2 | WEIGHT: 167 LBS | HEART RATE: 67 BPM | TEMPERATURE: 98 F

## 2022-03-16 DIAGNOSIS — I10 BENIGN ESSENTIAL HYPERTENSION: ICD-10-CM

## 2022-03-16 DIAGNOSIS — R79.89 ELEVATED BRAIN NATRIURETIC PEPTIDE (BNP) LEVEL: ICD-10-CM

## 2022-03-16 DIAGNOSIS — R91.1 LEFT LOWER LOBE PULMONARY NODULE: ICD-10-CM

## 2022-03-16 DIAGNOSIS — I48.91 NEW ONSET A-FIB (H): ICD-10-CM

## 2022-03-16 DIAGNOSIS — I48.19 PERSISTENT ATRIAL FIBRILLATION (H): ICD-10-CM

## 2022-03-16 DIAGNOSIS — E87.6 HYPOKALEMIA: ICD-10-CM

## 2022-03-16 DIAGNOSIS — I48.91 ATRIAL FIBRILLATION WITH RVR (H): ICD-10-CM

## 2022-03-16 DIAGNOSIS — Z79.01 ON COUMADIN FOR ATRIAL FIBRILLATION (H): ICD-10-CM

## 2022-03-16 DIAGNOSIS — I50.32 CHRONIC DIASTOLIC HEART FAILURE (H): Primary | ICD-10-CM

## 2022-03-16 DIAGNOSIS — E83.42 HYPOMAGNESEMIA: ICD-10-CM

## 2022-03-16 DIAGNOSIS — I48.91 ON COUMADIN FOR ATRIAL FIBRILLATION (H): ICD-10-CM

## 2022-03-16 DIAGNOSIS — R60.0 LOWER EXTREMITY EDEMA: ICD-10-CM

## 2022-03-16 PROCEDURE — G0463 HOSPITAL OUTPT CLINIC VISIT: HCPCS

## 2022-03-16 PROCEDURE — 99214 OFFICE O/P EST MOD 30 MIN: CPT | Performed by: INTERNAL MEDICINE

## 2022-03-16 RX ORDER — FUROSEMIDE 20 MG
20 TABLET ORAL DAILY
Qty: 90 TABLET | Refills: 3 | Status: SHIPPED | OUTPATIENT
Start: 2022-03-16 | End: 2023-03-20

## 2022-03-16 RX ORDER — LOSARTAN POTASSIUM 50 MG/1
50 TABLET ORAL DAILY
Qty: 90 TABLET | Refills: 3 | Status: SHIPPED | OUTPATIENT
Start: 2022-03-16 | End: 2023-03-03

## 2022-03-16 RX ORDER — DIGOXIN 125 MCG
125 TABLET ORAL
Qty: 45 TABLET | Refills: 3 | Status: SHIPPED | OUTPATIENT
Start: 2022-03-16 | End: 2023-05-02

## 2022-03-16 ASSESSMENT — PAIN SCALES - GENERAL: PAINLEVEL: NO PAIN (0)
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
## 2022-03-16 NOTE — PATIENT INSTRUCTIONS
Thank you for allowing Dr. Nguyen and our  team to participate in your care. Please call our office at [phone] with scheduling questions or if you need to cancel or change your appointment. With any other questions or concerns you may call [facility] cardiology nurse at [phone].       If you experience chest pain, chest pressure, chest tightness, shortness of breath, fainting, lightheadedness, nausea, vomiting, or other concerning symptoms, please report to the Emergency Department or call 911. These symptoms may be emergent, and best treated in the Emergency Department.    Follow up in 1 year       losartan (COZAAR) 50 MG tablet Take 1 tablet (50 mg) by mouth daily

## 2022-03-16 NOTE — NURSING NOTE
"Chief Complaint   Patient presents with     RECHECK     Heart Problem       Initial BP (!) 164/73   Pulse 67   Temp 98  F (36.7  C) (Tympanic)   Resp 16   Ht 1.702 m (5' 7\")   Wt 75.8 kg (167 lb)   SpO2 95%   BMI 26.16 kg/m   Estimated body mass index is 26.16 kg/m  as calculated from the following:    Height as of this encounter: 1.702 m (5' 7\").    Weight as of this encounter: 75.8 kg (167 lb).  Medication Reconciliation: complete  Juliana Uribe LPN    "

## 2022-05-04 ENCOUNTER — APPOINTMENT (OUTPATIENT)
Dept: GENERAL RADIOLOGY | Facility: HOSPITAL | Age: 77
End: 2022-05-04
Attending: STUDENT IN AN ORGANIZED HEALTH CARE EDUCATION/TRAINING PROGRAM
Payer: MEDICARE

## 2022-05-04 ENCOUNTER — HOSPITAL ENCOUNTER (EMERGENCY)
Facility: HOSPITAL | Age: 77
Discharge: HOME | End: 2022-05-04
Attending: STUDENT IN AN ORGANIZED HEALTH CARE EDUCATION/TRAINING PROGRAM | Admitting: STUDENT IN AN ORGANIZED HEALTH CARE EDUCATION/TRAINING PROGRAM
Payer: MEDICARE

## 2022-05-04 VITALS
BODY MASS INDEX: 25.84 KG/M2 | TEMPERATURE: 97.8 F | HEART RATE: 98 BPM | OXYGEN SATURATION: 97 % | WEIGHT: 165 LBS | SYSTOLIC BLOOD PRESSURE: 158 MMHG | DIASTOLIC BLOOD PRESSURE: 98 MMHG | RESPIRATION RATE: 16 BRPM

## 2022-05-04 DIAGNOSIS — J40 BRONCHITIS: ICD-10-CM

## 2022-05-04 DIAGNOSIS — R06.02 SOB (SHORTNESS OF BREATH): ICD-10-CM

## 2022-05-04 DIAGNOSIS — R06.2 WHEEZING: ICD-10-CM

## 2022-05-04 LAB
ANION GAP SERPL CALCULATED.3IONS-SCNC: 5 MMOL/L (ref 3–14)
BUN SERPL-MCNC: 18 MG/DL (ref 7–30)
CALCIUM SERPL-MCNC: 8.8 MG/DL (ref 8.5–10.1)
CHLORIDE BLD-SCNC: 102 MMOL/L (ref 94–109)
CO2 SERPL-SCNC: 29 MMOL/L (ref 20–32)
CREAT SERPL-MCNC: 0.87 MG/DL (ref 0.52–1.04)
ERYTHROCYTE [DISTWIDTH] IN BLOOD BY AUTOMATED COUNT: 14.6 % (ref 10–15)
FLUAV RNA SPEC QL NAA+PROBE: NEGATIVE
FLUBV RNA RESP QL NAA+PROBE: NEGATIVE
GFR SERPL CREATININE-BSD FRML MDRD: 68 ML/MIN/1.73M2
GLUCOSE BLD-MCNC: 117 MG/DL (ref 70–99)
HCT VFR BLD AUTO: 42.9 % (ref 35–47)
HGB BLD-MCNC: 14.3 G/DL (ref 11.7–15.7)
INR PPP: 2.74 (ref 0.85–1.15)
MCH RBC QN AUTO: 29.9 PG (ref 26.5–33)
MCHC RBC AUTO-ENTMCNC: 33.3 G/DL (ref 31.5–36.5)
MCV RBC AUTO: 90 FL (ref 78–100)
PLATELET # BLD AUTO: 199 10E3/UL (ref 150–450)
POTASSIUM BLD-SCNC: 4.1 MMOL/L (ref 3.4–5.3)
RBC # BLD AUTO: 4.79 10E6/UL (ref 3.8–5.2)
RSV RNA SPEC NAA+PROBE: NEGATIVE
SARS-COV-2 RNA RESP QL NAA+PROBE: NEGATIVE
SODIUM SERPL-SCNC: 136 MMOL/L (ref 133–144)
TROPONIN I SERPL HS-MCNC: 8 NG/L
WBC # BLD AUTO: 5.7 10E3/UL (ref 4–11)

## 2022-05-04 PROCEDURE — 94640 AIRWAY INHALATION TREATMENT: CPT

## 2022-05-04 PROCEDURE — 71046 X-RAY EXAM CHEST 2 VIEWS: CPT

## 2022-05-04 PROCEDURE — 250N000009 HC RX 250: Performed by: STUDENT IN AN ORGANIZED HEALTH CARE EDUCATION/TRAINING PROGRAM

## 2022-05-04 PROCEDURE — 99285 EMERGENCY DEPT VISIT HI MDM: CPT | Mod: 25

## 2022-05-04 PROCEDURE — 36415 COLL VENOUS BLD VENIPUNCTURE: CPT | Performed by: STUDENT IN AN ORGANIZED HEALTH CARE EDUCATION/TRAINING PROGRAM

## 2022-05-04 PROCEDURE — 93010 ELECTROCARDIOGRAM REPORT: CPT | Performed by: INTERNAL MEDICINE

## 2022-05-04 PROCEDURE — 250N000011 HC RX IP 250 OP 636: Performed by: STUDENT IN AN ORGANIZED HEALTH CARE EDUCATION/TRAINING PROGRAM

## 2022-05-04 PROCEDURE — 999N000157 HC STATISTIC RCP TIME EA 10 MIN

## 2022-05-04 PROCEDURE — 84484 ASSAY OF TROPONIN QUANT: CPT | Performed by: STUDENT IN AN ORGANIZED HEALTH CARE EDUCATION/TRAINING PROGRAM

## 2022-05-04 PROCEDURE — 87637 SARSCOV2&INF A&B&RSV AMP PRB: CPT | Performed by: STUDENT IN AN ORGANIZED HEALTH CARE EDUCATION/TRAINING PROGRAM

## 2022-05-04 PROCEDURE — 82310 ASSAY OF CALCIUM: CPT | Performed by: STUDENT IN AN ORGANIZED HEALTH CARE EDUCATION/TRAINING PROGRAM

## 2022-05-04 PROCEDURE — 93005 ELECTROCARDIOGRAM TRACING: CPT

## 2022-05-04 PROCEDURE — 99284 EMERGENCY DEPT VISIT MOD MDM: CPT | Performed by: STUDENT IN AN ORGANIZED HEALTH CARE EDUCATION/TRAINING PROGRAM

## 2022-05-04 PROCEDURE — 85014 HEMATOCRIT: CPT | Performed by: STUDENT IN AN ORGANIZED HEALTH CARE EDUCATION/TRAINING PROGRAM

## 2022-05-04 PROCEDURE — C9803 HOPD COVID-19 SPEC COLLECT: HCPCS

## 2022-05-04 PROCEDURE — 85610 PROTHROMBIN TIME: CPT | Performed by: STUDENT IN AN ORGANIZED HEALTH CARE EDUCATION/TRAINING PROGRAM

## 2022-05-04 PROCEDURE — 82374 ASSAY BLOOD CARBON DIOXIDE: CPT | Performed by: STUDENT IN AN ORGANIZED HEALTH CARE EDUCATION/TRAINING PROGRAM

## 2022-05-04 RX ORDER — ALBUTEROL SULFATE 0.83 MG/ML
2.5 SOLUTION RESPIRATORY (INHALATION) EVERY 5 MIN PRN
Status: DISCONTINUED | OUTPATIENT
Start: 2022-05-04 | End: 2022-05-04 | Stop reason: HOSPADM

## 2022-05-04 RX ORDER — ALBUTEROL SULFATE 0.83 MG/ML
2.5 SOLUTION RESPIRATORY (INHALATION) EVERY 4 HOURS PRN
Qty: 90 ML | Refills: 0
Start: 2022-05-04 | End: 2024-01-01

## 2022-05-04 RX ORDER — IPRATROPIUM BROMIDE AND ALBUTEROL SULFATE 2.5; .5 MG/3ML; MG/3ML
3 SOLUTION RESPIRATORY (INHALATION) ONCE
Status: COMPLETED | OUTPATIENT
Start: 2022-05-04 | End: 2022-05-04

## 2022-05-04 RX ORDER — DEXAMETHASONE 6 MG/1
6 TABLET ORAL DAILY
Qty: 2 TABLET | Refills: 0 | Status: SHIPPED | OUTPATIENT
Start: 2022-05-05 | End: 2022-05-24

## 2022-05-04 RX ORDER — AZITHROMYCIN 250 MG/1
TABLET, FILM COATED ORAL
Qty: 6 TABLET | Refills: 0 | Status: SHIPPED | OUTPATIENT
Start: 2022-05-04 | End: 2022-05-09

## 2022-05-04 RX ADMIN — ALBUTEROL SULFATE 2.5 MG: 2.5 SOLUTION RESPIRATORY (INHALATION) at 15:32

## 2022-05-04 RX ADMIN — IPRATROPIUM BROMIDE AND ALBUTEROL SULFATE 3 ML: .5; 3 SOLUTION RESPIRATORY (INHALATION) at 15:32

## 2022-05-04 RX ADMIN — DEXAMETHASONE 10 MG: 2 TABLET ORAL at 16:26

## 2022-05-04 NOTE — DISCHARGE INSTRUCTIONS
- Please take the prescribed steroids and use your nebulizer/inhaler for your symptoms every 4 hours as needed (instead of every 6 hours)  - Please return to the Emergency Room if you do not improve, feel worse, or have any new or concerning symptoms. We would especially want to see you back if you experience more difficulty breathing  - Please follow up with a primary care physician in 2-3 days to ensure you are improving and not having more difficulty breathing

## 2022-05-04 NOTE — ED TRIAGE NOTES
"\"Here for a cough for 1 week and shortness of breath for 2 days.  I have been taking my nebs but sometimes they do not last the full 6 hrs.  I have of history of atrial fibrillation.\"      "

## 2022-05-04 NOTE — ED PROVIDER NOTES
History     Chief Complaint   Patient presents with     Shortness of Breath     Cough     HPI  Kerri Gonsales is a 76 year old female with PMH A fib on warfarin/metoprolol, pneumonia, CHF, presenting with cough and shortness of breath. Symptoms ongoing for around 1 week. Started as mild cough. Now has some SOB and wheezing.  She has an inhaler which she has been using at home and this seems to help, but she is only scheduled to take it every 6 hours and sometimes it seems to wear off before then.  She has no history of COPD and has never been a smoker.  She has no history of asthma.  She is prescribed an inhaler for wheezing.  She has no swelling in her legs.  She has no history of PE or DVT and is on warfarin for her A. fib.  No recent surgery or hormones. She denies any chest pain. No N/V/D.    Allergies:  Allergies   Allergen Reactions     Aspirin      Heart palpitations       Problem List:    Patient Active Problem List    Diagnosis Date Noted     Persistent atrial fibrillation (H) 03/31/2021     Priority: Medium     On amiodarone therapy 03/31/2021     Priority: Medium     Elevated brain natriuretic peptide (BNP) level 03/31/2021     Priority: Medium     History of pneumonia on 2/12/2021 03/31/2021     Priority: Medium     Goiter 03/31/2021     Priority: Medium     H/O hiatal hernia-moderate on 2/22/2021 03/31/2021     Priority: Medium     Left lower lobe pulmonary nodule on 2/12/2021 03/31/2021     Priority: Medium     Osteoarthritis of spine 03/31/2021     Priority: Medium     Lower extremity edema 03/31/2021     Priority: Medium     On Coumadin for atrial fibrillation (H) 03/31/2021     Priority: Medium     Atrial fibrillation with RVR (H) 02/22/2021     Priority: Medium     Hypokalemia 02/12/2021     Priority: Medium     Hypomagnesemia 02/12/2021     Priority: Medium     New onset a-fib on 2/12/21 (H) 02/12/2021     Priority: Medium     Chronic diastolic heart failure (H) 02/12/2021     Priority: Medium      Simple chronic bronchitis (H) 02/12/2021     Priority: Medium     Benign essential hypertension 02/12/2021     Priority: Medium        Past Medical History:    No past medical history on file.    Past Surgical History:    Past Surgical History:   Procedure Laterality Date     APPENDECTOMY       AS REMOVAL OF OVARY/TUBE(S) N/A        Family History:    No family history on file.    Social History:  Marital Status:   [4]  Social History     Tobacco Use     Smoking status: Never Smoker     Smokeless tobacco: Never Used   Substance Use Topics     Alcohol use: Not Currently     Drug use: Never        Medications:    albuterol (PROVENTIL) (2.5 MG/3ML) 0.083% neb solution  azithromycin (ZITHROMAX Z-ARNIE) 250 MG tablet  [START ON 5/5/2022] dexamethasone (DECADRON) 6 MG tablet  digoxin (LANOXIN) 125 MCG tablet  furosemide (LASIX) 20 MG tablet  gentamicin (GARAMYCIN) 0.3 % ophthalmic solution  ipratropium - albuterol 0.5 mg/2.5 mg/3 mL (DUONEB) 0.5-2.5 (3) MG/3ML neb solution  losartan (COZAAR) 50 MG tablet  MECLIZINE HCL PO  metoprolol tartrate (LOPRESSOR) 100 MG tablet  warfarin ANTICOAGULANT (COUMADIN) 5 MG tablet      Review of Systems   All other systems reviewed and are negative.      Physical Exam   BP: (!) 154/105  Pulse: 100  Temp: 97.8  F (36.6  C)  Resp: 16  Weight: 74.8 kg (165 lb)  SpO2: 97 %      Physical Exam  Vitals and nursing note reviewed.   Constitutional:       Appearance: She is well-developed.   HENT:      Head: Normocephalic.      Mouth/Throat:      Mouth: Mucous membranes are moist.   Eyes:      Pupils: Pupils are equal, round, and reactive to light.   Cardiovascular:      Rate and Rhythm: Normal rate and regular rhythm.   Pulmonary:      Effort: Pulmonary effort is normal. No tachypnea, accessory muscle usage or respiratory distress.      Breath sounds: Examination of the right-upper field reveals wheezing. Examination of the left-upper field reveals wheezing. Examination of the  right-middle field reveals wheezing. Examination of the left-middle field reveals wheezing. Examination of the right-lower field reveals wheezing. Examination of the left-lower field reveals wheezing. Wheezing present. No decreased breath sounds, rhonchi or rales.   Chest:      Chest wall: No tenderness.   Abdominal:      Palpations: Abdomen is soft.      Tenderness: There is no abdominal tenderness.   Musculoskeletal:         General: Normal range of motion.      Cervical back: Normal range of motion.      Right lower leg: No edema.      Left lower leg: No edema.   Skin:     General: Skin is warm.      Capillary Refill: Capillary refill takes less than 2 seconds.   Neurological:      General: No focal deficit present.      Mental Status: She is alert and oriented to person, place, and time.   Psychiatric:         Mood and Affect: Mood normal.         Behavior: Behavior normal.         ED Course        ED Course as of 05/04/22 1627   Wed May 04, 2022   1625 Chest XR,  PA & LAT  No evidence of pneumonia. Will plan to treat for bronchitis with azithromycin, steroids, duonebs. She has improved wheezing after treatment here.   1626 Findings were discussed with the patient. Additional verbal instructions were discussed with the patient as well. Instructed to follow up with a primary care provider within 3 days. Also discussed specific warning signs and instructed to return to the ED if there are any concerns. Patient voiced understanding of instructions, questions were answered and the patient was discharged home in stable condition.     Procedures              EKG Interpretation:      Interpreted by ARIANA BARBOSA MD  Time reviewed: 1530  Symptoms at time of EKG: SOB, cough   Rhythm: atrial fibrillation - controlled  Rate: Normal  Axis: Normal  Ectopy: none  Conduction: normal  ST Segments/ T Waves: Non-specific ST-T wave changes  Q Waves: none  Comparison to prior: Unchanged    Clinical Impression: non-specific  EKG         Results for orders placed or performed during the hospital encounter of 05/04/22 (from the past 24 hour(s))   Troponin I   Result Value Ref Range    Troponin I High Sensitivity 8 <54 ng/L   CBC with platelets   Result Value Ref Range    WBC Count 5.7 4.0 - 11.0 10e3/uL    RBC Count 4.79 3.80 - 5.20 10e6/uL    Hemoglobin 14.3 11.7 - 15.7 g/dL    Hematocrit 42.9 35.0 - 47.0 %    MCV 90 78 - 100 fL    MCH 29.9 26.5 - 33.0 pg    MCHC 33.3 31.5 - 36.5 g/dL    RDW 14.6 10.0 - 15.0 %    Platelet Count 199 150 - 450 10e3/uL   Basic metabolic panel   Result Value Ref Range    Sodium 136 133 - 144 mmol/L    Potassium 4.1 3.4 - 5.3 mmol/L    Chloride 102 94 - 109 mmol/L    Carbon Dioxide (CO2) 29 20 - 32 mmol/L    Anion Gap 5 3 - 14 mmol/L    Urea Nitrogen 18 7 - 30 mg/dL    Creatinine 0.87 0.52 - 1.04 mg/dL    Calcium 8.8 8.5 - 10.1 mg/dL    Glucose 117 (H) 70 - 99 mg/dL    GFR Estimate 68 >60 mL/min/1.73m2   INR   Result Value Ref Range    INR 2.74 (H) 0.85 - 1.15   Chest XR,  PA & LAT    Narrative    PROCEDURE:  XR CHEST 2 VW    HISTORY:  SOB, cough.     COMPARISON:  None.    FINDINGS:   The cardiac silhouette is normal in size. The pulmonary vasculature is  normal.  The lungs are clear. No pleural effusion or pneumothorax.      Impression    IMPRESSION:  No acute cardiopulmonary disease.      JADA SIMS MD         SYSTEM ID:  [id number]       Medications   albuterol (PROVENTIL) neb solution 2.5 mg (2.5 mg Nebulization Given 5/4/22 1532)   ipratropium - albuterol 0.5 mg/2.5 mg/3 mL (DUONEB) neb solution 3 mL (3 mLs Nebulization Given 5/4/22 1532)   dexamethasone (DECADRON) tablet 10 mg (10 mg Oral Given 5/4/22 1626)       Assessments & Plan (with Medical Decision Making)     I have reviewed the nursing notes.    Cough and SOB consistent with bronchitis vs URI vs pneumonia. Not consistent with CHF given her exam and hx. Symptoms improve with neb. Will give nebs here and increase frequency at home. No  diagnosis of COPD/Asthma but clear audible wheezing on exam which improved with nebs. Plan on steroids and azithromycin for bronchitis. Not consistent with PE and already anticoagulated. EKG shows no ischemia. Labs otherwise reassuring. See ED Course.    I have reviewed the findings, diagnosis, plan and need for follow up with the patient.    New Prescriptions    AZITHROMYCIN (ZITHROMAX Z-ARNIE) 250 MG TABLET    Two tablets on the first day, then one tablet daily for the next 4 days    DEXAMETHASONE (DECADRON) 6 MG TABLET    Take 1 tablet (6 mg) by mouth daily       Final diagnoses:   SOB (shortness of breath)   Wheezing   Bronchitis       5/4/2022   [facility]     Kalen Cheng MD  05/04/22 [id number]

## 2022-05-24 ENCOUNTER — HOSPITAL ENCOUNTER (EMERGENCY)
Facility: HOSPITAL | Age: 77
Discharge: HOME | End: 2022-05-24
Attending: NURSE PRACTITIONER | Admitting: NURSE PRACTITIONER
Payer: MEDICARE

## 2022-05-24 ENCOUNTER — APPOINTMENT (OUTPATIENT)
Dept: GENERAL RADIOLOGY | Facility: HOSPITAL | Age: 77
End: 2022-05-24
Attending: NURSE PRACTITIONER
Payer: MEDICARE

## 2022-05-24 VITALS
OXYGEN SATURATION: 94 % | RESPIRATION RATE: 16 BRPM | TEMPERATURE: 98.3 F | SYSTOLIC BLOOD PRESSURE: 148 MMHG | HEART RATE: 86 BPM | DIASTOLIC BLOOD PRESSURE: 63 MMHG

## 2022-05-24 DIAGNOSIS — R06.02 SHORTNESS OF BREATH: ICD-10-CM

## 2022-05-24 DIAGNOSIS — I50.32 CHRONIC DIASTOLIC HEART FAILURE (H): ICD-10-CM

## 2022-05-24 DIAGNOSIS — I48.20 CHRONIC ATRIAL FIBRILLATION (H): ICD-10-CM

## 2022-05-24 LAB
ANION GAP SERPL CALCULATED.3IONS-SCNC: 4 MMOL/L (ref 3–14)
BASOPHILS # BLD AUTO: 0 10E3/UL (ref 0–0.2)
BASOPHILS NFR BLD AUTO: 1 %
BUN SERPL-MCNC: 17 MG/DL (ref 7–30)
CALCIUM SERPL-MCNC: 9.2 MG/DL (ref 8.5–10.1)
CHLORIDE BLD-SCNC: 104 MMOL/L (ref 94–109)
CO2 SERPL-SCNC: 30 MMOL/L (ref 20–32)
CREAT SERPL-MCNC: 0.82 MG/DL (ref 0.52–1.04)
EOSINOPHIL # BLD AUTO: 0.5 10E3/UL (ref 0–0.7)
EOSINOPHIL NFR BLD AUTO: 11 %
ERYTHROCYTE [DISTWIDTH] IN BLOOD BY AUTOMATED COUNT: 15.1 % (ref 10–15)
GFR SERPL CREATININE-BSD FRML MDRD: 72 ML/MIN/1.73M2
GLUCOSE BLD-MCNC: 127 MG/DL (ref 70–99)
HCT VFR BLD AUTO: 39.7 % (ref 35–47)
HGB BLD-MCNC: 13.4 G/DL (ref 11.7–15.7)
HOLD SPECIMEN: NORMAL
IMM GRANULOCYTES # BLD: 0 10E3/UL
IMM GRANULOCYTES NFR BLD: 0 %
LYMPHOCYTES # BLD AUTO: 1.4 10E3/UL (ref 0.8–5.3)
LYMPHOCYTES NFR BLD AUTO: 31 %
MCH RBC QN AUTO: 30.4 PG (ref 26.5–33)
MCHC RBC AUTO-ENTMCNC: 33.8 G/DL (ref 31.5–36.5)
MCV RBC AUTO: 90 FL (ref 78–100)
MONOCYTES # BLD AUTO: 0.3 10E3/UL (ref 0–1.3)
MONOCYTES NFR BLD AUTO: 7 %
NEUTROPHILS # BLD AUTO: 2.2 10E3/UL (ref 1.6–8.3)
NEUTROPHILS NFR BLD AUTO: 50 %
NRBC # BLD AUTO: 0 10E3/UL
NRBC BLD AUTO-RTO: 0 /100
NT-PROBNP SERPL-MCNC: 2599 PG/ML (ref 0–1800)
PLATELET # BLD AUTO: 152 10E3/UL (ref 150–450)
POTASSIUM BLD-SCNC: 4.1 MMOL/L (ref 3.4–5.3)
RBC # BLD AUTO: 4.41 10E6/UL (ref 3.8–5.2)
SODIUM SERPL-SCNC: 138 MMOL/L (ref 133–144)
WBC # BLD AUTO: 4.4 10E3/UL (ref 4–11)

## 2022-05-24 PROCEDURE — 71046 X-RAY EXAM CHEST 2 VIEWS: CPT

## 2022-05-24 PROCEDURE — 99214 OFFICE O/P EST MOD 30 MIN: CPT | Performed by: NURSE PRACTITIONER

## 2022-05-24 PROCEDURE — 36415 COLL VENOUS BLD VENIPUNCTURE: CPT | Performed by: NURSE PRACTITIONER

## 2022-05-24 PROCEDURE — 83880 ASSAY OF NATRIURETIC PEPTIDE: CPT | Performed by: NURSE PRACTITIONER

## 2022-05-24 PROCEDURE — G0463 HOSPITAL OUTPT CLINIC VISIT: HCPCS | Mod: 25

## 2022-05-24 PROCEDURE — 85014 HEMATOCRIT: CPT | Performed by: NURSE PRACTITIONER

## 2022-05-24 PROCEDURE — 80048 BASIC METABOLIC PNL TOTAL CA: CPT | Performed by: NURSE PRACTITIONER

## 2022-05-24 ASSESSMENT — ENCOUNTER SYMPTOMS
VOMITING: 0
DIARRHEA: 0
CHILLS: 0
MYALGIAS: 0
FATIGUE: 1
WHEEZING: 1
APPETITE CHANGE: 0
SINUS PAIN: 1
FEVER: 0
EYES NEGATIVE: 1
SORE THROAT: 0
ACTIVITY CHANGE: 1
RHINORRHEA: 0
COUGH: 1
SINUS PRESSURE: 1
SHORTNESS OF BREATH: 1
CHEST TIGHTNESS: 0
HEADACHES: 0
NAUSEA: 0

## 2022-05-24 NOTE — DISCHARGE INSTRUCTIONS
Today; take 1 extra dose of Lasix 20 mg when you take your other Lasix  You may take 1 more extra dose in the following few days for increased shortness of breath .  Schedule an appointment with your primary care provider at the [facility]  Try not to use your DuoNeb's any more than every 6 hours if possible  If the extra dose of Lasix and DuoNeb's do not improve your breathing needs to be reevaluated in the ER.  Return to ER if you develop chest pain, fevers, chills or extreme worsening of shortness of breath

## 2022-05-24 NOTE — ED TRIAGE NOTES
Pt presents with c/o ongoing symptoms from bronchitis. Pt was seen in the ED and was given an abx and steroid. Pt still has the cough and sob. Pt has been using the neb to help. Pt also has afib but is being treated with coumadin.

## 2022-05-24 NOTE — ED TRIAGE NOTES
"    Pt states May 4th nikky had a cough and SOB and was put on an antibiotic and a steroid \"and I really felt great after that\". Pt states her cough has increased and also feelings of SOB. States \"I am using nebulizer like Dr Cheng said I could every 4 hours when I feel like this and they really help\". Denies any fevers or body aches.  "

## 2022-05-24 NOTE — ED PROVIDER NOTES
History     Chief Complaint   Patient presents with     Cough     HPI  Kerri Gonsales is a 77 year old female who was treated with an antibiotic on May 5 for bronchitis.  She felt better until the last 5 or 6 days.  Now presents with symptoms of fatigue, cough, shortness of breath, and wheezing.  Has chronic sinus pain and pressure.  No known sick contacts.  Is doing duo nebs every 4 hours.  History of pneumonia February, 2021.  History of chronic diastolic heart failure.  Non-smoker.  Had second COVID vaccination March, 2021.  Denies fevers, chills, nausea, vomiting, diarrhea, headaches, and chest pain and chest tightness.    Allergies:  Allergies   Allergen Reactions     Aspirin      Heart palpitations       Problem List:    Patient Active Problem List    Diagnosis Date Noted     Persistent atrial fibrillation (H) 03/31/2021     Priority: Medium     On amiodarone therapy 03/31/2021     Priority: Medium     Elevated brain natriuretic peptide (BNP) level 03/31/2021     Priority: Medium     History of pneumonia on 2/12/2021 03/31/2021     Priority: Medium     Goiter 03/31/2021     Priority: Medium     H/O hiatal hernia-moderate on 2/22/2021 03/31/2021     Priority: Medium     Left lower lobe pulmonary nodule on 2/12/2021 03/31/2021     Priority: Medium     Osteoarthritis of spine 03/31/2021     Priority: Medium     Lower extremity edema 03/31/2021     Priority: Medium     On Coumadin for atrial fibrillation (H) 03/31/2021     Priority: Medium     Atrial fibrillation with RVR (H) 02/22/2021     Priority: Medium     Hypokalemia 02/12/2021     Priority: Medium     Hypomagnesemia 02/12/2021     Priority: Medium     New onset a-fib on 2/12/21 (H) 02/12/2021     Priority: Medium     Chronic diastolic heart failure (H) 02/12/2021     Priority: Medium     Simple chronic bronchitis (H) 02/12/2021     Priority: Medium     Benign essential hypertension 02/12/2021     Priority: Medium        Past Medical History:    History  reviewed. No pertinent past medical history.    Past Surgical History:    Past Surgical History:   Procedure Laterality Date     APPENDECTOMY       AS REMOVAL OF OVARY/TUBE(S) N/A        Family History:    History reviewed. No pertinent family history.    Social History:  Marital Status:   [4]  Social History     Tobacco Use     Smoking status: Never Smoker     Smokeless tobacco: Never Used   Substance Use Topics     Alcohol use: Not Currently     Drug use: Never        Medications:    albuterol (PROVENTIL) (2.5 MG/3ML) 0.083% neb solution  digoxin (LANOXIN) 125 MCG tablet  furosemide (LASIX) 20 MG tablet  gentamicin (GARAMYCIN) 0.3 % ophthalmic solution  ipratropium - albuterol 0.5 mg/2.5 mg/3 mL (DUONEB) 0.5-2.5 (3) MG/3ML neb solution  losartan (COZAAR) 50 MG tablet  MECLIZINE HCL PO  metoprolol tartrate (LOPRESSOR) 100 MG tablet  warfarin ANTICOAGULANT (COUMADIN) 5 MG tablet          Review of Systems   Constitutional: Positive for activity change and fatigue. Negative for appetite change, chills and fever.   HENT: Positive for sinus pressure and sinus pain (chronic). Negative for ear pain, rhinorrhea and sore throat.         Not sleeping. Using nebs every four hours   Eyes: Negative.    Respiratory: Positive for cough, shortness of breath and wheezing. Negative for chest tightness.    Cardiovascular: Negative for chest pain and leg swelling.   Gastrointestinal: Negative for diarrhea, nausea and vomiting.   Genitourinary: Negative.    Musculoskeletal: Negative for myalgias.        Ankle swelling   Skin: Negative.    Neurological: Negative for headaches.       Physical Exam   BP: 148/63  Pulse: 86  Temp: 98.3  F (36.8  C)  Resp: 16  SpO2: 94 %      Physical Exam  Vitals and nursing note reviewed.   Constitutional:       General: She is not in acute distress.     Appearance: She is overweight.   Cardiovascular:      Rate and Rhythm: Normal rate. Rhythm irregular.      Heart sounds: Normal heart sounds. No  murmur heard.  Pulmonary:      Effort: Pulmonary effort is normal. No respiratory distress.      Breath sounds: No wheezing, rhonchi or rales.   Musculoskeletal:      Left lower leg: Edema (2-3+) present.   Skin:     General: Skin is warm and dry.      Capillary Refill: Capillary refill takes less than 2 seconds.   Neurological:      Mental Status: She is alert and oriented to person, place, and time.   Psychiatric:         Behavior: Behavior normal.         ED Course                 Procedures             No results found for this or any previous visit (from the past 24 hour(s)).    Medications - No data to display    Assessments & Plan (with Medical Decision Making)     I have reviewed the nursing notes.    I have reviewed the findings, diagnosis, plan and need for follow up with the patient.  (I50.32) Chronic diastolic heart failure (H)  (R06.02) Shortness of breath    (I48.20) Chronic atrial fibrillation (H)  Comment: 77 year old female who was treated with an antibiotic on May 5 for bronchitis.  She felt better until the last 5 or 6 days.  Now presents with symptoms of fatigue, cough, shortness of breath, and wheezing.  Has chronic sinus pain and pressure.  No known sick contacts.  Is doing duo nebs every 4 hours.  History of pneumonia February, 2021.  History of chronic diastolic heart failure.  Non-smoker.  Had second COVID vaccination March, 2021.  Denies fevers, chills, nausea, vomiting, diarrhea, headaches, and chest pain and chest tightness.    MDM: Irregular heart tones.  No murmur.  Lung sounds clear throughout  Left leg has 2-3+ pitting edema    Chest x-ray reviewed and per radiology; no acute cardiopulmonary process    BNP 2599 this appears to be chronically elevated.  December, 2021: 3608, September, 2021: 2524    CBC and CMP negative    Plan: Education provided for atrial fibrillation.  Today; take 1 extra dose of Lasix 20 mg when you take your other Lasix  You may take 1 more extra dose in the  following few days for increased shortness of breath .  Schedule an appointment with your primary care provider at the [facility]  Try not to use your DuoNeb's any more than every 6 hours if possible  If the extra dose of Lasix and DuoNeb's do not improve your breathing needs to be reevaluated in the ER.  Return to ER if you develop chest pain, fevers, chills or extreme worsening of shortness of breath  These discharge instructions and medications were reviewed with her and understanding verbalized.    This document was prepared using a combination of typing and voice generated software.  While every attempt was made for accuracy, spelling and grammatical errors may exist.    Discharge Medication List as of 5/24/2022  2:35 PM          Final diagnoses:   Shortness of breath   Chronic atrial fibrillation (H)       5/24/2022   [facility]         Jacqueline Herrera, CNP  05/25/22 2014

## 2023-01-01 ENCOUNTER — LAB (OUTPATIENT)
Dept: LAB | Facility: OTHER | Age: 78
End: 2023-01-01
Payer: MEDICARE

## 2023-01-01 ENCOUNTER — TRANSFERRED RECORDS (OUTPATIENT)
Dept: HEALTH INFORMATION MANAGEMENT | Facility: CLINIC | Age: 78
End: 2023-01-01

## 2023-01-01 DIAGNOSIS — Z51.81 ENCOUNTER FOR MONITORING DIGOXIN THERAPY: ICD-10-CM

## 2023-01-01 DIAGNOSIS — Z79.899 ENCOUNTER FOR MONITORING DIGOXIN THERAPY: ICD-10-CM

## 2023-01-01 DIAGNOSIS — I48.91 ATRIAL FIBRILLATION WITH RVR (H): ICD-10-CM

## 2023-01-01 DIAGNOSIS — I48.19 PERSISTENT ATRIAL FIBRILLATION (H): ICD-10-CM

## 2023-01-01 DIAGNOSIS — I50.32 CHRONIC DIASTOLIC HEART FAILURE (H): ICD-10-CM

## 2023-01-01 DIAGNOSIS — I48.91 NEW ONSET A-FIB (H): ICD-10-CM

## 2023-01-01 LAB — DIGOXIN SERPL-MCNC: 0.6 NG/ML (ref 0.6–2)

## 2023-01-01 PROCEDURE — 80162 ASSAY OF DIGOXIN TOTAL: CPT | Mod: ZL

## 2023-01-01 PROCEDURE — 36415 COLL VENOUS BLD VENIPUNCTURE: CPT | Mod: ZL

## 2023-01-04 ENCOUNTER — APPOINTMENT (OUTPATIENT)
Dept: GENERAL RADIOLOGY | Facility: HOSPITAL | Age: 78
End: 2023-01-04
Attending: STUDENT IN AN ORGANIZED HEALTH CARE EDUCATION/TRAINING PROGRAM
Payer: MEDICARE

## 2023-01-04 ENCOUNTER — HOSPITAL ENCOUNTER (EMERGENCY)
Facility: HOSPITAL | Age: 78
Discharge: HOME | End: 2023-01-04
Attending: STUDENT IN AN ORGANIZED HEALTH CARE EDUCATION/TRAINING PROGRAM | Admitting: STUDENT IN AN ORGANIZED HEALTH CARE EDUCATION/TRAINING PROGRAM
Payer: MEDICARE

## 2023-01-04 VITALS
DIASTOLIC BLOOD PRESSURE: 72 MMHG | TEMPERATURE: 98 F | HEART RATE: 88 BPM | OXYGEN SATURATION: 99 % | SYSTOLIC BLOOD PRESSURE: 156 MMHG | HEIGHT: 67 IN | WEIGHT: 163 LBS | RESPIRATION RATE: 20 BRPM | BODY MASS INDEX: 25.58 KG/M2

## 2023-01-04 DIAGNOSIS — R06.02 SHORTNESS OF BREATH: ICD-10-CM

## 2023-01-04 LAB
ALBUMIN SERPL BCG-MCNC: 4.1 G/DL (ref 3.5–5.2)
ALP SERPL-CCNC: 69 U/L (ref 35–104)
ALT SERPL W P-5'-P-CCNC: 17 U/L (ref 10–35)
ANION GAP SERPL CALCULATED.3IONS-SCNC: 10 MMOL/L (ref 7–15)
AST SERPL W P-5'-P-CCNC: 24 U/L (ref 10–35)
BASOPHILS # BLD AUTO: 0.1 10E3/UL (ref 0–0.2)
BASOPHILS NFR BLD AUTO: 1 %
BILIRUB SERPL-MCNC: 0.6 MG/DL
BUN SERPL-MCNC: 18.5 MG/DL (ref 8–23)
CALCIUM SERPL-MCNC: 9.5 MG/DL (ref 8.8–10.2)
CHLORIDE SERPL-SCNC: 100 MMOL/L (ref 98–107)
CREAT SERPL-MCNC: 0.93 MG/DL (ref 0.51–0.95)
DEPRECATED HCO3 PLAS-SCNC: 28 MMOL/L (ref 22–29)
EOSINOPHIL # BLD AUTO: 0.3 10E3/UL (ref 0–0.7)
EOSINOPHIL NFR BLD AUTO: 5 %
ERYTHROCYTE [DISTWIDTH] IN BLOOD BY AUTOMATED COUNT: 14.1 % (ref 10–15)
GFR SERPL CREATININE-BSD FRML MDRD: 62 ML/MIN/1.73M2
GLUCOSE SERPL-MCNC: 137 MG/DL (ref 70–99)
HCT VFR BLD AUTO: 40.5 % (ref 35–47)
HGB BLD-MCNC: 13.7 G/DL (ref 11.7–15.7)
HOLD SPECIMEN: NORMAL
IMM GRANULOCYTES # BLD: 0 10E3/UL
IMM GRANULOCYTES NFR BLD: 1 %
LYMPHOCYTES # BLD AUTO: 1.4 10E3/UL (ref 0.8–5.3)
LYMPHOCYTES NFR BLD AUTO: 22 %
MCH RBC QN AUTO: 31.4 PG (ref 26.5–33)
MCHC RBC AUTO-ENTMCNC: 33.8 G/DL (ref 31.5–36.5)
MCV RBC AUTO: 93 FL (ref 78–100)
MONOCYTES # BLD AUTO: 0.3 10E3/UL (ref 0–1.3)
MONOCYTES NFR BLD AUTO: 5 %
NEUTROPHILS # BLD AUTO: 4 10E3/UL (ref 1.6–8.3)
NEUTROPHILS NFR BLD AUTO: 66 %
NRBC # BLD AUTO: 0 10E3/UL
NRBC BLD AUTO-RTO: 0 /100
PLATELET # BLD AUTO: 166 10E3/UL (ref 150–450)
POTASSIUM SERPL-SCNC: 3.7 MMOL/L (ref 3.4–5.3)
PROT SERPL-MCNC: 7.6 G/DL (ref 6.4–8.3)
RBC # BLD AUTO: 4.36 10E6/UL (ref 3.8–5.2)
SODIUM SERPL-SCNC: 138 MMOL/L (ref 136–145)
WBC # BLD AUTO: 6.1 10E3/UL (ref 4–11)

## 2023-01-04 PROCEDURE — 36415 COLL VENOUS BLD VENIPUNCTURE: CPT | Performed by: STUDENT IN AN ORGANIZED HEALTH CARE EDUCATION/TRAINING PROGRAM

## 2023-01-04 PROCEDURE — 93005 ELECTROCARDIOGRAM TRACING: CPT

## 2023-01-04 PROCEDURE — 93010 ELECTROCARDIOGRAM REPORT: CPT | Performed by: INTERNAL MEDICINE

## 2023-01-04 PROCEDURE — 99285 EMERGENCY DEPT VISIT HI MDM: CPT | Mod: 25

## 2023-01-04 PROCEDURE — 71046 X-RAY EXAM CHEST 2 VIEWS: CPT

## 2023-01-04 PROCEDURE — 80053 COMPREHEN METABOLIC PANEL: CPT | Performed by: STUDENT IN AN ORGANIZED HEALTH CARE EDUCATION/TRAINING PROGRAM

## 2023-01-04 PROCEDURE — 85004 AUTOMATED DIFF WBC COUNT: CPT | Performed by: STUDENT IN AN ORGANIZED HEALTH CARE EDUCATION/TRAINING PROGRAM

## 2023-01-04 PROCEDURE — 99284 EMERGENCY DEPT VISIT MOD MDM: CPT | Performed by: STUDENT IN AN ORGANIZED HEALTH CARE EDUCATION/TRAINING PROGRAM

## 2023-01-04 RX ORDER — PREDNISONE 20 MG/1
40 TABLET ORAL DAILY
Qty: 10 TABLET | Refills: 0 | Status: SHIPPED | OUTPATIENT
Start: 2023-01-04 | End: 2023-01-09

## 2023-01-04 ASSESSMENT — ACTIVITIES OF DAILY LIVING (ADL)
ADLS_ACUITY_SCORE: 35

## 2023-01-04 NOTE — ED TRIAGE NOTES
Presents with SOB and wheezing, onset was 5 days ago and got worse yesterday.  No fever. Took Duoneb and helped      coughing up clear phlegm.

## 2023-01-04 NOTE — ED NOTES
Care Transitions focused note:      Met with patient and her grand daughter.  States she sees Dr Bardales at the [facility] in [facility]. She is interested in transferring care to [place] as she lives in [facility] [facility] and sees cardiology here at [place].  Does not want an appointment at this time but wants some names of providers.  I did provide her with some names, my number and the number to scheduling.  No further questions or concerns at this time.    REG Jacobs

## 2023-01-04 NOTE — DISCHARGE INSTRUCTIONS
Giving you a handout to read about COPD.  As we discussed you do not have an official diagnosis of COPD or asthma, however the treatments of the same continue using your nebulizer as we discussed.  Take the prednisone for a short 5-day burst.  There is no need to taper this medication after such a short burst.  Take it in the morning as it can be stimulating.  Return to the emergency department for worsening symptoms or new concerning symptoms.  Follow-up with your primary care provider within the next 1 week

## 2023-01-04 NOTE — ED PROVIDER NOTES
History     Chief Complaint   Patient presents with     Shortness of Breath            HPI  Kerri Gonsales is a 77 year old female with a history of atrial fibrillation, pneumonia presents to the emergency department today complaining of cough and wheezing for the last 5 days.  She feels like it is not getting any better has not been particularly getting worse either.  She states that things get much better when she uses her home nebulizer which she has been using every 3-4 hours as needed.  She states that she has a cough that is productive of clear mucus without any changes in color.  She denies chest pain abdominal pain nausea vomiting diarrhea.  No reports of fevers.  She has no other complaints.    Allergies:  Allergies   Allergen Reactions     Aspirin      Heart palpitations       Problem List:    Patient Active Problem List    Diagnosis Date Noted     Persistent atrial fibrillation (H) 03/31/2021     Priority: Medium     On amiodarone therapy 03/31/2021     Priority: Medium     Elevated brain natriuretic peptide (BNP) level 03/31/2021     Priority: Medium     History of pneumonia on 2/12/2021 03/31/2021     Priority: Medium     Goiter 03/31/2021     Priority: Medium     H/O hiatal hernia-moderate on 2/22/2021 03/31/2021     Priority: Medium     Left lower lobe pulmonary nodule on 2/12/2021 03/31/2021     Priority: Medium     Osteoarthritis of spine 03/31/2021     Priority: Medium     Lower extremity edema 03/31/2021     Priority: Medium     On Coumadin for atrial fibrillation (H) 03/31/2021     Priority: Medium     Atrial fibrillation with RVR (H) 02/22/2021     Priority: Medium     Hypokalemia 02/12/2021     Priority: Medium     Hypomagnesemia 02/12/2021     Priority: Medium     New onset a-fib on 2/12/21 (H) 02/12/2021     Priority: Medium     Chronic diastolic heart failure (H) 02/12/2021     Priority: Medium     Simple chronic bronchitis (H) 02/12/2021     Priority: Medium     Benign essential  "hypertension 02/12/2021     Priority: Medium        Past Medical History:    No past medical history on file.    Past Surgical History:    Past Surgical History:   Procedure Laterality Date     APPENDECTOMY       AS REMOVAL OF OVARY/TUBE(S) N/A        Family History:    No family history on file.    Social History:  Marital Status:   [4]  Social History     Tobacco Use     Smoking status: Never     Smokeless tobacco: Never   Substance Use Topics     Alcohol use: Not Currently     Drug use: Never        Medications:    albuterol (PROVENTIL) (2.5 MG/3ML) 0.083% neb solution  digoxin (LANOXIN) 125 MCG tablet  furosemide (LASIX) 20 MG tablet  gentamicin (GARAMYCIN) 0.3 % ophthalmic solution  ipratropium - albuterol 0.5 mg/2.5 mg/3 mL (DUONEB) 0.5-2.5 (3) MG/3ML neb solution  losartan (COZAAR) 50 MG tablet  MECLIZINE HCL PO  metoprolol tartrate (LOPRESSOR) 100 MG tablet  predniSONE (DELTASONE) 20 MG tablet  warfarin ANTICOAGULANT (COUMADIN) 5 MG tablet          Review of Systems  A complete review of systems was performed and is otherwise negative.     Physical Exam   BP: 173/87  Pulse: 75  Temp: 97.5  F (36.4  C)  Resp: 18  Height: 170.2 cm (5' 7\")  Weight: 73.9 kg (163 lb)  SpO2: 92 %      Physical Exam  Constitutional: Alert and conversant. NAD   HENT: NCAT   Eyes: Normal pupils   Neck: supple   CV: Normal rate, regular rhythm, no murmur   Pulmonary/Chest: Non-labored respirations, good inspiratory air movement, subtle high-pitched wheezing diffusely  Abdominal: Soft, non-tender, non-distended   MSK: ENCISO.   Neuro: Alert and appropriate   Skin: Warm and dry. No diaphoresis. No rashes on exposed skin    Psych: Appropriate mood and affect     ED Course              ED Course as of 01/04/23 1233   Wed Jan 04, 2023   1049 Differential includes but is not limited to acute coronary syndrome, CHF, cardiac tamponade/pericardial effusion, arrhythmia, PNA, Covid-19, sepsis, COPD, reactive airway disease/asthma, pulmonary " embolism, pneumothorax, pleural effusion, pulmonary edema, anemia, foreign body, metabolic abnormality, acidosis.    1232 Exam consistent with obstructive process such as COPD or asthma, though neither has been officially diagnosed for her as far as I can tell.  But she does use a DuoNeb and things get better at home with that.  No leukocytosis and no pneumonia on x-ray.  No specific indications for further DuoNeb's here.  Given the fact that she is using them 3 hours apart, I feel she would probably benefit from a trial of steroids at this time.  Steroids therefore prescribed.  Patient is appropriate for further outpatient management, discharged in stable additional questions and return precautions given.     Procedures              Results for orders placed or performed during the hospital encounter of 01/04/23 (from the past 24 hour(s))   XR Chest 2 Views    Narrative    PROCEDURE:  XR CHEST 2 VIEWS    HISTORY:  SOB.     COMPARISON:  None.    FINDINGS:   The cardiac silhouette is normal in size. The pulmonary vasculature is  normal.  There is some linear atelectasis or scarring at the left lung  base. No pleural effusion or pneumothorax.      Impression    IMPRESSION:  Linear atelectasis or scarring in the left lung base      JADA SIMS MD         SYSTEM ID:  [id number]   CBC with platelets differential    Narrative    The following orders were created for panel order CBC with platelets differential.  Procedure                               Abnormality         Status                     ---------                               -----------         ------                     CBC with platelets and d...[[id number]]                      Final result                 Please view results for these tests on the individual orders.   Comprehensive metabolic panel   Result Value Ref Range    Sodium 138 136 - 145 mmol/L    Potassium 3.7 3.4 - 5.3 mmol/L    Chloride 100 98 - 107 mmol/L    Carbon Dioxide (CO2) 28 22 - 29  mmol/L    Anion Gap 10 7 - 15 mmol/L    Urea Nitrogen 18.5 8.0 - 23.0 mg/dL    Creatinine 0.93 0.51 - 0.95 mg/dL    Calcium 9.5 8.8 - 10.2 mg/dL    Glucose 137 (H) 70 - 99 mg/dL    Alkaline Phosphatase 69 35 - 104 U/L    AST 24 10 - 35 U/L    ALT 17 10 - 35 U/L    Protein Total 7.6 6.4 - 8.3 g/dL    Albumin 4.1 3.5 - 5.2 g/dL    Bilirubin Total 0.6 <=1.2 mg/dL    GFR Estimate 62 >60 mL/min/1.73m2   CBC with platelets and differential   Result Value Ref Range    WBC Count 6.1 4.0 - 11.0 10e3/uL    RBC Count 4.36 3.80 - 5.20 10e6/uL    Hemoglobin 13.7 11.7 - 15.7 g/dL    Hematocrit 40.5 35.0 - 47.0 %    MCV 93 78 - 100 fL    MCH 31.4 26.5 - 33.0 pg    MCHC 33.8 31.5 - 36.5 g/dL    RDW 14.1 10.0 - 15.0 %    Platelet Count 166 150 - 450 10e3/uL    % Neutrophils 66 %    % Lymphocytes 22 %    % Monocytes 5 %    % Eosinophils 5 %    % Basophils 1 %    % Immature Granulocytes 1 %    NRBCs per 100 WBC 0 <1 /100    Absolute Neutrophils 4.0 1.6 - 8.3 10e3/uL    Absolute Lymphocytes 1.4 0.8 - 5.3 10e3/uL    Absolute Monocytes 0.3 0.0 - 1.3 10e3/uL    Absolute Eosinophils 0.3 0.0 - 0.7 10e3/uL    Absolute Basophils 0.1 0.0 - 0.2 10e3/uL    Absolute Immature Granulocytes 0.0 <=0.4 10e3/uL    Absolute NRBCs 0.0 10e3/uL   Extra Tube    Narrative    The following orders were created for panel order Extra Tube.  Procedure                               Abnormality         Status                     ---------                               -----------         ------                     Extra Blue Top Tube[[id number]]                              Final result               Extra Red Top Tube[[id number]]                               Final result               Extra Heparinized Syringe[[id number]]                        Final result                 Please view results for these tests on the individual orders.   Extra Blue Top Tube   Result Value Ref Range    Hold Specimen JIC    Extra Red Top Tube   Result Value Ref Range    Hold Specimen  JIC    Extra Heparinized Syringe   Result Value Ref Range    Hold Specimen JIC        Medications - No data to display    Assessments & Plan (with Medical Decision Making)     I have reviewed the nursing notes.    I have reviewed the findings, diagnosis, plan and need for follow up with the patient.      New Prescriptions    PREDNISONE (DELTASONE) 20 MG TABLET    Take 2 tablets (40 mg) by mouth daily for 5 days       Final diagnoses:   Shortness of breath       1/4/2023   [facility]     Elvin Mendez MD  01/04/23 0783

## 2023-02-03 DIAGNOSIS — I10 BENIGN ESSENTIAL HYPERTENSION: ICD-10-CM

## 2023-02-03 DIAGNOSIS — Z79.01 ON COUMADIN FOR ATRIAL FIBRILLATION (H): ICD-10-CM

## 2023-02-03 DIAGNOSIS — I48.19 PERSISTENT ATRIAL FIBRILLATION (H): ICD-10-CM

## 2023-02-03 DIAGNOSIS — I48.91 ATRIAL FIBRILLATION WITH RVR (H): ICD-10-CM

## 2023-02-03 DIAGNOSIS — I48.91 NEW ONSET A-FIB (H): ICD-10-CM

## 2023-02-03 DIAGNOSIS — I48.91 ON COUMADIN FOR ATRIAL FIBRILLATION (H): ICD-10-CM

## 2023-02-06 RX ORDER — METOPROLOL TARTRATE 100 MG
TABLET ORAL
Qty: 360 TABLET | Refills: 0 | Status: SHIPPED | OUTPATIENT
Start: 2023-02-06 | End: 2023-05-02

## 2023-02-06 NOTE — TELEPHONE ENCOUNTER
metoprolol tartrate (LOPRESSOR) 100 MG tablet   Last Written Prescription Date:  2-7-22  Last Fill Quantity: 360,   # refills: 3  Last Office Visit: 3-  cardiology  Future Office visit:    Next 5 appointments (look out 90 days)    Mar 20, 2023  1:00 PM  (Arrive by 12:45 PM)  Return Visit with Abel Nguyen DO  [facility] [place] ([facility] [place] ) [address] [place] [address]  [phone]           Routing refill request to provider for review/approval because:

## 2023-02-10 ENCOUNTER — HOSPITAL ENCOUNTER (EMERGENCY)
Facility: HOSPITAL | Age: 78
Discharge: HOME | End: 2023-02-10
Attending: PHYSICIAN ASSISTANT | Admitting: PHYSICIAN ASSISTANT
Payer: MEDICARE

## 2023-02-10 ENCOUNTER — APPOINTMENT (OUTPATIENT)
Dept: GENERAL RADIOLOGY | Facility: HOSPITAL | Age: 78
End: 2023-02-10
Attending: PHYSICIAN ASSISTANT
Payer: MEDICARE

## 2023-02-10 VITALS
OXYGEN SATURATION: 98 % | HEART RATE: 77 BPM | DIASTOLIC BLOOD PRESSURE: 82 MMHG | SYSTOLIC BLOOD PRESSURE: 156 MMHG | RESPIRATION RATE: 20 BRPM | TEMPERATURE: 97.8 F

## 2023-02-10 DIAGNOSIS — J41.0 SIMPLE CHRONIC BRONCHITIS (H): ICD-10-CM

## 2023-02-10 LAB
ANION GAP SERPL CALCULATED.3IONS-SCNC: 10 MMOL/L (ref 7–15)
BASOPHILS # BLD AUTO: 0 10E3/UL (ref 0–0.2)
BASOPHILS NFR BLD AUTO: 1 %
BUN SERPL-MCNC: 17.9 MG/DL (ref 8–23)
CALCIUM SERPL-MCNC: 9.1 MG/DL (ref 8.8–10.2)
CHLORIDE SERPL-SCNC: 99 MMOL/L (ref 98–107)
CREAT SERPL-MCNC: 0.96 MG/DL (ref 0.51–0.95)
DEPRECATED HCO3 PLAS-SCNC: 30 MMOL/L (ref 22–29)
EOSINOPHIL # BLD AUTO: 0.5 10E3/UL (ref 0–0.7)
EOSINOPHIL NFR BLD AUTO: 7 %
ERYTHROCYTE [DISTWIDTH] IN BLOOD BY AUTOMATED COUNT: 14.1 % (ref 10–15)
GFR SERPL CREATININE-BSD FRML MDRD: 60 ML/MIN/1.73M2
GLUCOSE SERPL-MCNC: 123 MG/DL (ref 70–99)
HCT VFR BLD AUTO: 41.4 % (ref 35–47)
HGB BLD-MCNC: 14 G/DL (ref 11.7–15.7)
HOLD SPECIMEN: NORMAL
HOLD SPECIMEN: NORMAL
IMM GRANULOCYTES # BLD: 0 10E3/UL
IMM GRANULOCYTES NFR BLD: 0 %
INR PPP: 2.32 (ref 0.85–1.15)
LYMPHOCYTES # BLD AUTO: 1.6 10E3/UL (ref 0.8–5.3)
LYMPHOCYTES NFR BLD AUTO: 22 %
MCH RBC QN AUTO: 31.3 PG (ref 26.5–33)
MCHC RBC AUTO-ENTMCNC: 33.8 G/DL (ref 31.5–36.5)
MCV RBC AUTO: 92 FL (ref 78–100)
MONOCYTES # BLD AUTO: 0.4 10E3/UL (ref 0–1.3)
MONOCYTES NFR BLD AUTO: 6 %
NEUTROPHILS # BLD AUTO: 4.7 10E3/UL (ref 1.6–8.3)
NEUTROPHILS NFR BLD AUTO: 64 %
NRBC # BLD AUTO: 0 10E3/UL
NRBC BLD AUTO-RTO: 0 /100
PLATELET # BLD AUTO: 152 10E3/UL (ref 150–450)
POTASSIUM SERPL-SCNC: 3.9 MMOL/L (ref 3.4–5.3)
RBC # BLD AUTO: 4.48 10E6/UL (ref 3.8–5.2)
SODIUM SERPL-SCNC: 139 MMOL/L (ref 136–145)
WBC # BLD AUTO: 7.3 10E3/UL (ref 4–11)

## 2023-02-10 PROCEDURE — 99284 EMERGENCY DEPT VISIT MOD MDM: CPT | Performed by: PHYSICIAN ASSISTANT

## 2023-02-10 PROCEDURE — 85025 COMPLETE CBC W/AUTO DIFF WBC: CPT | Performed by: PHYSICIAN ASSISTANT

## 2023-02-10 PROCEDURE — 99285 EMERGENCY DEPT VISIT HI MDM: CPT | Mod: 25

## 2023-02-10 PROCEDURE — 85610 PROTHROMBIN TIME: CPT | Performed by: PHYSICIAN ASSISTANT

## 2023-02-10 PROCEDURE — 93005 ELECTROCARDIOGRAM TRACING: CPT

## 2023-02-10 PROCEDURE — 93010 ELECTROCARDIOGRAM REPORT: CPT | Performed by: INTERNAL MEDICINE

## 2023-02-10 PROCEDURE — 250N000012 HC RX MED GY IP 250 OP 636 PS 637: Performed by: PHYSICIAN ASSISTANT

## 2023-02-10 PROCEDURE — 80048 BASIC METABOLIC PNL TOTAL CA: CPT | Performed by: PHYSICIAN ASSISTANT

## 2023-02-10 PROCEDURE — 71046 X-RAY EXAM CHEST 2 VIEWS: CPT

## 2023-02-10 PROCEDURE — 36415 COLL VENOUS BLD VENIPUNCTURE: CPT | Performed by: PHYSICIAN ASSISTANT

## 2023-02-10 RX ORDER — PREDNISONE 20 MG/1
40 TABLET ORAL ONCE
Status: COMPLETED | OUTPATIENT
Start: 2023-02-10 | End: 2023-02-10

## 2023-02-10 RX ORDER — PREDNISONE 20 MG/1
TABLET ORAL
Qty: 10 TABLET | Refills: 0 | Status: SHIPPED | OUTPATIENT
Start: 2023-02-10 | End: 2023-03-20

## 2023-02-10 RX ORDER — AZITHROMYCIN 250 MG/1
TABLET, FILM COATED ORAL
Qty: 6 TABLET | Refills: 0 | Status: SHIPPED | OUTPATIENT
Start: 2023-02-10 | End: 2023-02-15

## 2023-02-10 RX ADMIN — PREDNISONE 40 MG: 20 TABLET ORAL at 13:00

## 2023-02-10 ASSESSMENT — ACTIVITIES OF DAILY LIVING (ADL): ADLS_ACUITY_SCORE: 35

## 2023-02-10 NOTE — DISCHARGE INSTRUCTIONS
Plan to follow-up with your doctor on Monday as you are scheduled.  If you feel like you are getting worse you should return to the ER.

## 2023-02-10 NOTE — ED TRIAGE NOTES
was seen in Jan for the same shortness of breath.  Was seen in Urgent care and moved to the ER.  Nebs every 4 hours without relief.  Was given oral medication that did help with the SOB. Unable to get into primary provider.

## 2023-02-10 NOTE — ED PROVIDER NOTES
History     Chief Complaint   Patient presents with     Shortness of Breath     Seen in jan for the same problem     HPI  Kerri Gonsales is a 77 year old female with a past medical history of atrial fibrillation [facility]  Patient states that symptoms began beginning of January she was seen in the ER at that time treated with some steroids which had a significant improvement in her symptoms and albuterol nebulizers.  Patient scheduled her first available appointment with her doctor at that time which is not until this coming Monday patient notes over the last 3 to 4 days she has felt more short of breath than usual now her sputum is changed to a yellow thick substance.  Patient denies any fevers, chills, dizziness or lightheadedness.  Denies any nausea, vomiting or diarrhea.  Denies any chest pain, ear pain sore throat or rhinorrhea.  Denies any edema or leg tenderness.  Denies a history of PE or DVT.  Has never smoked.  Thinks her last echo was 1 year ago.  She notes that she is been using nebulizers every 4-6 hours which significantly improve her symptoms.    Patient is on Coumadin for atrial fibrillation.    Allergies:  Allergies   Allergen Reactions     Aspirin      Heart palpitations       Problem List:    Patient Active Problem List    Diagnosis Date Noted     Persistent atrial fibrillation (H) 03/31/2021     Priority: Medium     On amiodarone therapy 03/31/2021     Priority: Medium     Elevated brain natriuretic peptide (BNP) level 03/31/2021     Priority: Medium     History of pneumonia on 2/12/2021 03/31/2021     Priority: Medium     Goiter 03/31/2021     Priority: Medium     H/O hiatal hernia-moderate on 2/22/2021 03/31/2021     Priority: Medium     Left lower lobe pulmonary nodule on 2/12/2021 03/31/2021     Priority: Medium     Osteoarthritis of spine 03/31/2021     Priority: Medium     Lower extremity edema 03/31/2021     Priority: Medium      On Coumadin for atrial fibrillation (H) 03/31/2021     Priority: Medium     Atrial fibrillation with RVR (H) 02/22/2021     Priority: Medium     Hypokalemia 02/12/2021     Priority: Medium     Hypomagnesemia 02/12/2021     Priority: Medium     New onset a-fib on 2/12/21 (H) 02/12/2021     Priority: Medium     Chronic diastolic heart failure (H) 02/12/2021     Priority: Medium     Simple chronic bronchitis (H) 02/12/2021     Priority: Medium     Benign essential hypertension 02/12/2021     Priority: Medium        Past Medical History:    No past medical history on file.    Past Surgical History:    Past Surgical History:   Procedure Laterality Date     APPENDECTOMY       AS REMOVAL OF OVARY/TUBE(S) N/A        Family History:    No family history on file.    Social History:  Marital Status:   [4]  Social History     Tobacco Use     Smoking status: Never     Smokeless tobacco: Never   Substance Use Topics     Alcohol use: Not Currently     Drug use: Never        Medications:    azithromycin (ZITHROMAX Z-ARNIE) 250 MG tablet  predniSONE (DELTASONE) 20 MG tablet  albuterol (PROVENTIL) (2.5 MG/3ML) 0.083% neb solution  digoxin (LANOXIN) 125 MCG tablet  furosemide (LASIX) 20 MG tablet  gentamicin (GARAMYCIN) 0.3 % ophthalmic solution  ipratropium - albuterol 0.5 mg/2.5 mg/3 mL (DUONEB) 0.5-2.5 (3) MG/3ML neb solution  losartan (COZAAR) 50 MG tablet  MECLIZINE HCL PO  metoprolol tartrate (LOPRESSOR) 100 MG tablet  warfarin ANTICOAGULANT (COUMADIN) 5 MG tablet          Review of Systems   All other systems reviewed and are negative.      Physical Exam   BP: 179/99  Pulse: 93  Temp: 97.5  F (36.4  C)  Resp: 18  SpO2: 98 %      Physical Exam  Constitutional:       General: She is not in acute distress.     Appearance: Normal appearance. She is normal weight. She is not ill-appearing, toxic-appearing or diaphoretic.   HENT:      Head: Normocephalic and atraumatic.      Right Ear: External ear normal.      Left Ear:  External ear normal.   Eyes:      Extraocular Movements: Extraocular movements intact.      Conjunctiva/sclera: Conjunctivae normal.      Pupils: Pupils are equal, round, and reactive to light.   Cardiovascular:      Rate and Rhythm: Normal rate. Rhythm irregular.   Pulmonary:      Effort: Pulmonary effort is normal. No respiratory distress.      Breath sounds: Examination of the right-lower field reveals rhonchi. Examination of the left-lower field reveals rhonchi. Rhonchi present. No decreased breath sounds, wheezing or rales.   Musculoskeletal:         General: Normal range of motion.      Cervical back: Neck supple.   Skin:     General: Skin is warm and dry.      Coloration: Skin is not jaundiced or pale.   Neurological:      Mental Status: She is alert and oriented to person, place, and time. Mental status is at baseline.      Cranial Nerves: No cranial nerve deficit.   Psychiatric:         Mood and Affect: Mood normal.         ED Course       I have reviewed the epic chart, the nurses note and triage note. vital signs were reviewed.  I suspect the patient has chronic bronchitis.  I have very low clinical suspicion to think this is acute coronary syndrome or PE.  Patient is currently on anticoagulation we will check an INR.  We will obtain a chest x-ray.  CBC BMP INR will be obtained.  Patient was given a dose of prednisone here.  Patient's last echo appears to be in February 2021 ejection fraction is that this time was 50 to 55% reduced left ventricular function present was not able to assess diastolic function at that time mild to moderate mitral insufficiency is present mild to moderate aortic insufficiency was present.    Patient's lab work is obtained and reviewed.  BMP shows a borderline creatinine INR is in therapeutic range White count is normal hemoglobin stable.  Sounds like this count of a chronic bronchitis case given that she has had symptoms for approximately 6 weeks give her a Z-Kurtis.  And given  her steroid burst as well.  She is following up on Monday with her primary care doctor where likely pulmonary function test will help give her further diagnosis.  I do not believe there is any further work-up required at this time.  Patient is okay with this plan at this time    Procedures             Results for orders placed or performed during the hospital encounter of 02/10/23 (from the past 24 hour(s))   Chest XR,  PA & LAT    Narrative    XR CHEST 2 VIEWS    HISTORY: 77 years Female shortness of breath    COMPARISON: 1/4/2023    TECHNIQUE: 2 views of the chest were obtained.    FINDINGS: Two views of the chest were obtained. Heart size and  pulmonary vascularity are within normal limits, lungs are clear on  both views. No consolidating air space opacities are present.          Impression    IMPRESSION: Clear chest.    MARLYN PHELPS MD         SYSTEM ID:  [id number]   CBC with platelets differential    Narrative    The following orders were created for panel order CBC with platelets differential.  Procedure                               Abnormality         Status                     ---------                               -----------         ------                     CBC with platelets and d...[[id number]]                      Final result                 Please view results for these tests on the individual orders.   Basic metabolic panel   Result Value Ref Range    Sodium 139 136 - 145 mmol/L    Potassium 3.9 3.4 - 5.3 mmol/L    Chloride 99 98 - 107 mmol/L    Carbon Dioxide (CO2) 30 (H) 22 - 29 mmol/L    Anion Gap 10 7 - 15 mmol/L    Urea Nitrogen 17.9 8.0 - 23.0 mg/dL    Creatinine 0.96 (H) 0.51 - 0.95 mg/dL    Calcium 9.1 8.8 - 10.2 mg/dL    Glucose 123 (H) 70 - 99 mg/dL    GFR Estimate 60 (L) >60 mL/min/1.73m2   INR   Result Value Ref Range    INR 2.32 (H) 0.85 - 1.15   CBC with platelets and differential   Result Value Ref Range    WBC Count 7.3 4.0 - 11.0 10e3/uL    RBC Count 4.48 3.80 - 5.20 10e6/uL     Hemoglobin 14.0 11.7 - 15.7 g/dL    Hematocrit 41.4 35.0 - 47.0 %    MCV 92 78 - 100 fL    MCH 31.3 26.5 - 33.0 pg    MCHC 33.8 31.5 - 36.5 g/dL    RDW 14.1 10.0 - 15.0 %    Platelet Count 152 150 - 450 10e3/uL    % Neutrophils 64 %    % Lymphocytes 22 %    % Monocytes 6 %    % Eosinophils 7 %    % Basophils 1 %    % Immature Granulocytes 0 %    NRBCs per 100 WBC 0 <1 /100    Absolute Neutrophils 4.7 1.6 - 8.3 10e3/uL    Absolute Lymphocytes 1.6 0.8 - 5.3 10e3/uL    Absolute Monocytes 0.4 0.0 - 1.3 10e3/uL    Absolute Eosinophils 0.5 0.0 - 0.7 10e3/uL    Absolute Basophils 0.0 0.0 - 0.2 10e3/uL    Absolute Immature Granulocytes 0.0 <=0.4 10e3/uL    Absolute NRBCs 0.0 10e3/uL   Extra Tube    Narrative    The following orders were created for panel order Extra Tube.  Procedure                               Abnormality         Status                     ---------                               -----------         ------                     Extra Red Top Tube[[id number]]                               Final result               Extra Green Top (Lithium...[[id number]]                      Final result                 Please view results for these tests on the individual orders.   Extra Red Top Tube   Result Value Ref Range    Hold Specimen JIC    Extra Green Top (Lithium Heparin) ON ICE   Result Value Ref Range    Hold Specimen JIC        Medications   predniSONE (DELTASONE) tablet 40 mg (40 mg Oral Given 2/10/23 1300)       Assessments & Plan (with Medical Decision Making)     I have reviewed the nursing notes.    I have reviewed the findings, diagnosis, plan and need for follow up with the patient.          Discharge Medication List as of 2/10/2023  1:02 PM      START taking these medications    Details   azithromycin (ZITHROMAX Z-ARNIE) 250 MG tablet Two tablets on the first day, then one tablet daily for the next 4 days, Disp-6 tablet, R-0, E-Prescribe      predniSONE (DELTASONE) 20 MG tablet Take two tablets (= 40mg)  each day for 5 (five) days, Disp-10 tablet, R-0, E-Prescribe             Final diagnoses:   Simple chronic bronchitis (H)       2/10/2023   [facility]     Arnold Stark PA-C  02/10/23 1450

## 2023-02-14 DIAGNOSIS — R06.02 SHORTNESS OF BREATH: Primary | ICD-10-CM

## 2023-03-02 DIAGNOSIS — I50.32 CHRONIC DIASTOLIC HEART FAILURE (H): ICD-10-CM

## 2023-03-02 DIAGNOSIS — I10 BENIGN ESSENTIAL HYPERTENSION: ICD-10-CM

## 2023-03-03 RX ORDER — LOSARTAN POTASSIUM 50 MG/1
TABLET ORAL
Qty: 90 TABLET | Refills: 0 | Status: SHIPPED | OUTPATIENT
Start: 2023-03-03 | End: 2023-05-31

## 2023-03-03 NOTE — TELEPHONE ENCOUNTER
Losartan 50 mg      Last Written Prescription Date:  3/16/22  Last Fill Quantity: 90,   # refills: 3  Last Office Visit: 3/16/22  Future Office visit:    Next 5 appointments (look out 90 days)    Mar 20, 2023  1:00 PM  (Arrive by 12:45 PM)  Return Visit with Abel Nguyen DO  [facility] [place] ([facility] [place] ) [address]  [place] [address]  [phone]           Patient will run out of medication  Pended to covering Provider to review  Please call patient if med will not be approved  Thank you!  
1

## 2023-03-20 ENCOUNTER — OFFICE VISIT (OUTPATIENT)
Dept: CARDIOLOGY | Facility: OTHER | Age: 78
End: 2023-03-20
Attending: INTERNAL MEDICINE
Payer: COMMERCIAL

## 2023-03-20 VITALS
HEART RATE: 69 BPM | OXYGEN SATURATION: 97 % | BODY MASS INDEX: 26.6 KG/M2 | WEIGHT: 169.5 LBS | SYSTOLIC BLOOD PRESSURE: 153 MMHG | HEIGHT: 67 IN | DIASTOLIC BLOOD PRESSURE: 72 MMHG

## 2023-03-20 DIAGNOSIS — E87.6 HYPOKALEMIA: ICD-10-CM

## 2023-03-20 DIAGNOSIS — I10 BENIGN ESSENTIAL HYPERTENSION: ICD-10-CM

## 2023-03-20 DIAGNOSIS — I48.91 NEW ONSET A-FIB (H): ICD-10-CM

## 2023-03-20 DIAGNOSIS — R60.0 LOWER EXTREMITY EDEMA: ICD-10-CM

## 2023-03-20 DIAGNOSIS — I48.91 ON COUMADIN FOR ATRIAL FIBRILLATION (H): ICD-10-CM

## 2023-03-20 DIAGNOSIS — I48.91 ATRIAL FIBRILLATION WITH RVR (H): ICD-10-CM

## 2023-03-20 DIAGNOSIS — I50.32 CHRONIC DIASTOLIC HEART FAILURE (H): ICD-10-CM

## 2023-03-20 DIAGNOSIS — R79.89 ELEVATED BRAIN NATRIURETIC PEPTIDE (BNP) LEVEL: ICD-10-CM

## 2023-03-20 DIAGNOSIS — E83.42 HYPOMAGNESEMIA: Primary | ICD-10-CM

## 2023-03-20 DIAGNOSIS — I48.19 PERSISTENT ATRIAL FIBRILLATION (H): ICD-10-CM

## 2023-03-20 DIAGNOSIS — Z79.01 ON COUMADIN FOR ATRIAL FIBRILLATION (H): ICD-10-CM

## 2023-03-20 PROCEDURE — G0463 HOSPITAL OUTPT CLINIC VISIT: HCPCS

## 2023-03-20 PROCEDURE — 99214 OFFICE O/P EST MOD 30 MIN: CPT | Performed by: INTERNAL MEDICINE

## 2023-03-20 RX ORDER — DILTIAZEM HYDROCHLORIDE 120 MG/1
120 CAPSULE, EXTENDED RELEASE ORAL DAILY
Qty: 90 CAPSULE | Refills: 3 | Status: SHIPPED | OUTPATIENT
Start: 2023-03-20 | End: 2024-01-01

## 2023-03-20 RX ORDER — FUROSEMIDE 20 MG
20 TABLET ORAL 2 TIMES DAILY
Qty: 180 TABLET | Refills: 3 | Status: SHIPPED | OUTPATIENT
Start: 2023-03-20 | End: 2023-06-26

## 2023-03-20 RX ORDER — BUDESONIDE 0.5 MG/2ML
0.5 INHALANT ORAL 2 TIMES DAILY
COMMUNITY
Start: 2023-02-13

## 2023-03-20 ASSESSMENT — PAIN SCALES - GENERAL: PAINLEVEL: NO PAIN (0)

## 2023-03-20 NOTE — PROGRESS NOTES
[facility] HEART CARE   CARDIOLOGY PROGRESS NOTE     Chief Complaint   Patient presents with     Follow Up          Diagnosis:  1.  Atrial fibrillation, new on 2/12/2021. Digoxin, metoprolol, and Coumadin. H/O Amiodarone.   2.  Atrial fibrillation. H/O A. fib with RVR.  3.  Amiodarone stopped on 10/4/21.  4.  Diastolic dysfunction-suspected. BNP at 8292 on 2/22/2021.  5.  Lower extremity edema.  6.  Hiatal hernia-moderate on 2/22/2021.  7.  Left lower lobe nodule on 2/12/2011.  8.  Hypertension-uncontrolled.  9.  Hypokalemia 2.9 on 2/13/2021-resolved.  10.  Hypomagnesia on 2/24/2021 at 1.4, normalized.    Assessment/Plan:    1.    A-fib: Has daily episodes of palpitations.  Maxed on metoprolol 200 mg tartrate twice a day.  We will start on diltiazem 120 mg daily.  Continue Coumadin.  Would like to see her in 3 months rather than a year follow-up.  Briefly, discussed treatment options for A-fib.  2.  CHF: Suspect diastolic dysfunction.  Has mild peripheral edema.  PCP increased Lasix from 20 mg daily to 20 mg twice a day.  3.  Hypertension: Uncontrolled.  Blood pressure today 153/72.  Blood pressures have been high in the clinic.  Suggest that she consider diltiazem 120 mg daily.  Could increase losartan from 50 mg to 100 mg in the future.  4.  Follow-up in 3 months to assess blood pressures, heart rates, and symptoms with A-fib.            Interval history:  Kerri has had almost daily palpitations.  She has palpitations multiple times throughout the day.  Not severe but are bothersome.  She is maxed out on metoprolol 200 mg twice a day.  Heart rate today is 69 bpm.  Blood pressure is 153/72.  We will start on diltiazem 120 mg daily.  We will continue metoprolol 200 mg tartrate twice a day.  She is tolerating Coumadin without issue.  Coumadin does make her nervous but she has not had any significant bruising or bleeding.  No other issues or concerns.    HPI:    Mrs. Larkin who has seen a doctor rarely up until being  admitted to the hospital on 2/12/2021 for new onset A. fib.  She is presently on digoxin, metoprolol, Coumadin, and amiodarone for atrial fibrillation.  [id number] score is 4.     She also has a history of hypokalemia, hypomagnesia, suspected diastolic dysfunction with peripheral edema with a BNP of 8292, pneumonia on 2/12/2021, goiter on 2/12/2021, moderate hiatal hernia on 2/22/2021, arthritis in the spine, and hypertension.     She had been seen on a yearly basis for regular well check examinations.  On 2/12/2021, she presented to the ER as she had been dyspneic for a few days.  She was found to have new onset A. fib with rate of 120 bpm on her EKG.  She was admitted to the hospital.  She was provided with diltiazem IV twice with improved rates.  She was also placed on metoprolol and started on Xarelto 20 mg daily.  Hydrochlorothiazide and propranolol were discontinued.  She was discharged on metoprolol and diltiazem.     She was seen for 2nd time on 2/22/2021 in the ER.  She had worsening dyspnea and palpitations.  She was found to be in A. fib with RVR with rates in the 130-160's.  Blood pressure was in 130's systolically and saturations in the 90's on room air.  BNP was 8292.  She was given oral diltiazem and IV metoprolol in the ER.  She was then subsequently loaded on digoxin IV and admitted to the ICU.  IV diltiazem was titrated without much effect.  She was started on amiodarone with heart rates in the 90's to 120's.  Echo from 2/22/2021 showed her low normal EF of 50-55%.  Metoprolol 50 mg tartrate twice a day was added.  Her heart rate with activity would increase to the 140-150's.  Ultimately, she was discharged on amiodarone 200 mg twice a day, then 200 mg daily, digoxin 125  g every other day, metoprolol 50 mg tartrate twice a day, and Xarelto 20 mg daily.     Heart rate is better controlled.  But he will increase with activity.  She remains dyspneic with activity but does not complain of any  significant palpitations.  Xarelto was not affordable as she is on a limited income and was ultimately converted to Coumadin.     Was also mention that she was noted have a goiter, moderate hiatal hernia, left lower lobe nodule and arthritis in the spine on 2/12/2021.      Relevant testing:  Zio patch in 4/8/2021:  Underlying rhythm was atrial fibrillation.  Hrt rate ranged from 40 bpm, maximum heart rate of 135 bmp, averaging 79 bmp.  No significant bradycardia, 2nd degree Mobitz type II or 3rd degree heart block.  x1 pause lasting 3.0 sec's at 6:12 AM on 4/9/2021  + atrial fibrillation with 100% burden.  x10 triggered events and x9 diary entries.  These corresponded to atrial fibrillation and VE.  x0 runs of VT.  x0 runs of SVT.  Rare, less than 1% of PVC's, ventricular couplets, and ventricular triplets.  0 episodes of ventricular bigeminy.  + episodes of ventricular trigeminy lasting up to 2.6 sec's.    Echocardiogram in 2/22/2021:  No pericardial effusion is present.  Borderline (EF 50-55%) reduced left ventricular function is present.  Diastolic function not assessed due to atrial fibrillation.  The right ventricle is normal size.  Mild left atrial enlargement is present.  Mild to moderate mitral insufficiency is present.  The valve leaflets are not well visualized.  Mild to moderate aortic insufficiency is present.  Mild to moderate tricuspid insufficiency is present.  Trace to mild pulmonic insufficiency is present.  The aorta root is normal.    CTA chest on 2/12/2021:  Right upper lobe and right middle lobe infiltrate  suspicious for pneumonia.     substernal goiter deviating the trachea from left to right.     11 mm in diameter nodule in the left lower lobe.     No pulmonary emboli.      No diagnosis found.    History reviewed. No pertinent past medical history.    Past Surgical History:   Procedure Laterality Date     APPENDECTOMY       AS REMOVAL OF OVARY/TUBE(S) N/A        Allergies   Allergen Reactions      Aspirin      Heart palpitations       Current Outpatient Medications   Medication Sig Dispense Refill     albuterol (PROVENTIL) (2.5 MG/3ML) 0.083% neb solution Take 1 vial (2.5 mg) by nebulization every 4 hours as needed for shortness of breath / dyspnea or wheezing 90 mL 0     digoxin (LANOXIN) 125 MCG tablet Take 1 tablet (125 mcg) by mouth every 48 hours 45 tablet 3     furosemide (LASIX) 20 MG tablet Take 1 tablet (20 mg) by mouth daily (Patient taking differently: Take 20 mg by mouth 2 times daily) 90 tablet 3     gentamicin (GARAMYCIN) 0.3 % ophthalmic solution INSTILL 2 DROPS INTO EACH EYE EVERY 4 HOURS AS NEEDED FOR DRY EYES       ipratropium - albuterol 0.5 mg/2.5 mg/3 mL (DUONEB) 0.5-2.5 (3) MG/3ML neb solution Take 3 mLs by nebulization every 4 hours as needed       losartan (COZAAR) 50 MG tablet Take 1 tablet by mouth once daily 90 tablet 0     MECLIZINE HCL PO Take 25 mg by mouth as needed for dizziness       metoprolol tartrate (LOPRESSOR) 100 MG tablet Take 2 tablets by mouth twice daily 360 tablet 0     warfarin ANTICOAGULANT (COUMADIN) 5 MG tablet Take 5 mg by mouth daily Monday, Wednesday, Friday take 1/2 tablet  Tuesday, Thursday, Saturday, Sunday take 1 tablet       budesonide (PULMICORT) 0.5 MG/2ML neb solution USE 1 VIAL IN NEBULIZER TWICE DAILY (Patient not taking: Reported on 3/20/2023)         Social History     Socioeconomic History     Marital status:      Spouse name: Not on file     Number of children: Not on file     Years of education: Not on file     Highest education level: Not on file   Occupational History     Not on file   Tobacco Use     Smoking status: Never     Smokeless tobacco: Never   Substance and Sexual Activity     Alcohol use: Not Currently     Drug use: Never     Sexual activity: Not on file   Other Topics Concern     Not on file   Social History Narrative     Not on file     Social Determinants of Health     Financial Resource Strain: Not on file   Food  "Insecurity: Not on file   Transportation Needs: Not on file   Physical Activity: Not on file   Stress: Not on file   Social Connections: Not on file   Intimate Partner Violence: Not on file   Housing Stability: Not on file       LAB RESULTS:   No visits with results within 2 Month(s) from this visit.   Latest known visit with results is:   No results found for any previous visit.        Review of systems: Negative except that which was noted in the HPI.    Physical examination:  BP (!) 153/72 (BP Location: Right arm, Patient Position: Sitting, Cuff Size: Adult Large)   Pulse 69   Ht 1.702 m (5' 7\")   Wt 76.9 kg (169 lb 8 oz)   SpO2 97%   BMI 26.55 kg/m      GENERAL APPEARANCE: healthy, alert and no distress  HEENT: no icterus, no xanthelasmas, normal pupil size and reaction, no cyanosis.  NECK: no adenopathy, no asymmetry, masses.  CHEST: lungs clear to auscultation - no rales, rhonchi or wheezes, no use of accessory muscles, no retractions, respirations are unlabored, normal respiratory rate  CARDIOVASCULAR: Regular rate irregular rhythm normal S1 with physiologic split S2, no S3 or S4 and no murmur, click or rub  EXTREMITIES: no clubbing, cyanosis but with mild peripheral edema      Total time spent on day of visit, including review of tests, obtaining/reviewing separately obtained history, ordering medications/tests/procedures, communicating with PCP/consultants, and documenting in electronic medical record: 30 minutes.           Thank you for allowing me to participate in the care of your patient. Please do not hesitate to contact me if you have any questions.     Abel Nguyen, DO          "

## 2023-05-01 DIAGNOSIS — I48.91 ON COUMADIN FOR ATRIAL FIBRILLATION (H): ICD-10-CM

## 2023-05-01 DIAGNOSIS — I48.19 PERSISTENT ATRIAL FIBRILLATION (H): ICD-10-CM

## 2023-05-01 DIAGNOSIS — Z79.01 ON COUMADIN FOR ATRIAL FIBRILLATION (H): ICD-10-CM

## 2023-05-01 DIAGNOSIS — I48.91 NEW ONSET A-FIB (H): ICD-10-CM

## 2023-05-01 DIAGNOSIS — I10 BENIGN ESSENTIAL HYPERTENSION: ICD-10-CM

## 2023-05-01 DIAGNOSIS — I48.91 ATRIAL FIBRILLATION WITH RVR (H): ICD-10-CM

## 2023-05-02 RX ORDER — DIGOXIN 125 MCG
TABLET ORAL
Qty: 45 TABLET | Refills: 3 | Status: SHIPPED | OUTPATIENT
Start: 2023-05-02 | End: 2024-01-01

## 2023-05-02 RX ORDER — METOPROLOL TARTRATE 100 MG
TABLET ORAL
Qty: 360 TABLET | Refills: 3 | Status: SHIPPED | OUTPATIENT
Start: 2023-05-02 | End: 2024-01-01

## 2023-05-02 NOTE — TELEPHONE ENCOUNTER
Digoxin 125 MCG Oral Tablet      Last Written Prescription Date:  3/16/2022  Last Fill Quantity: 45,   # refills: 3  Last Office Visit: 3/20/2023  Future Office visit:    Next 5 appointments (look out 90 days)    Jun 26, 2023  1:30 PM  (Arrive by 1:15 PM)  Return Visit with Abel Nguyen DO  [facility] ([facility] ) [address]  [facility] [address]  [phone]           Routing refill request to provider for review/approval because:  Cardiac Glycoside Agents Protocol Failed 05/02/2023 08:01 AM   Protocol Details  Normal digoxin level on file in past 12 mos    Normal serum creatinine on file in past 12 mos         Metoprolol Tartrate 100 MG Oral Tablet      Last Written Prescription Date:  2/6/2023  Last Fill Quantity: 360,   # refills: 0  Last Office Visit: 3/20/2023  Future Office visit:    Next 5 appointments (look out 90 days)    Jun 26, 2023  1:30 PM  (Arrive by 1:15 PM)  Return Visit with Abel Nguyen DO  [facility] ([facility] ) [address]  [facility] [address]  [phone]           Routing refill request to provider for review/approval because:  Beta-Blockers Protocol Failed 05/02/2023 08:01 AM   Protocol Details  Blood pressure under 140/90 in past 12 months     Kimberly Boecker, RN

## 2023-05-31 DIAGNOSIS — I50.32 CHRONIC DIASTOLIC HEART FAILURE (H): ICD-10-CM

## 2023-05-31 DIAGNOSIS — I10 BENIGN ESSENTIAL HYPERTENSION: ICD-10-CM

## 2023-05-31 RX ORDER — LOSARTAN POTASSIUM 50 MG/1
50 TABLET ORAL DAILY
Qty: 90 TABLET | Refills: 3 | Status: SHIPPED | OUTPATIENT
Start: 2023-05-31 | End: 2024-01-01

## 2023-05-31 NOTE — TELEPHONE ENCOUNTER
losartan (COZAAR) 50 MG tablet      Last Written Prescription Date:  3/3/23  Last Fill Quantity: 90,   # refills: 0  Last Office Visit: 3/20/23  Future Office visit:    Next 5 appointments (look out 90 days)    Jun 26, 2023  1:30 PM  (Arrive by 1:15 PM)  Return Visit with Abel Nguyen DO  [facility] [place] ([facility] [place] ) [address]  [place] [address]  [phone]           Routing refill request to provider for review/approval because:

## 2023-06-26 ENCOUNTER — OFFICE VISIT (OUTPATIENT)
Dept: CARDIOLOGY | Facility: OTHER | Age: 78
End: 2023-06-26
Attending: INTERNAL MEDICINE
Payer: COMMERCIAL

## 2023-06-26 VITALS
HEART RATE: 66 BPM | HEIGHT: 67 IN | DIASTOLIC BLOOD PRESSURE: 68 MMHG | BODY MASS INDEX: 28.16 KG/M2 | TEMPERATURE: 97.6 F | WEIGHT: 179.4 LBS | SYSTOLIC BLOOD PRESSURE: 126 MMHG | OXYGEN SATURATION: 96 %

## 2023-06-26 DIAGNOSIS — I48.91 NEW ONSET A-FIB (H): ICD-10-CM

## 2023-06-26 DIAGNOSIS — R79.89 ELEVATED BRAIN NATRIURETIC PEPTIDE (BNP) LEVEL: ICD-10-CM

## 2023-06-26 DIAGNOSIS — Z79.01 ON COUMADIN FOR ATRIAL FIBRILLATION (H): ICD-10-CM

## 2023-06-26 DIAGNOSIS — I48.19 PERSISTENT ATRIAL FIBRILLATION (H): Primary | ICD-10-CM

## 2023-06-26 DIAGNOSIS — I10 BENIGN ESSENTIAL HYPERTENSION: ICD-10-CM

## 2023-06-26 DIAGNOSIS — I48.91 ATRIAL FIBRILLATION WITH RVR (H): ICD-10-CM

## 2023-06-26 DIAGNOSIS — Z87.19 H/O HIATAL HERNIA: ICD-10-CM

## 2023-06-26 DIAGNOSIS — I50.32 CHRONIC DIASTOLIC HEART FAILURE (H): ICD-10-CM

## 2023-06-26 DIAGNOSIS — R60.0 LOWER EXTREMITY EDEMA: ICD-10-CM

## 2023-06-26 DIAGNOSIS — Z51.81 ENCOUNTER FOR MONITORING DIGOXIN THERAPY: ICD-10-CM

## 2023-06-26 DIAGNOSIS — Z79.899 ENCOUNTER FOR MONITORING DIGOXIN THERAPY: ICD-10-CM

## 2023-06-26 DIAGNOSIS — I48.91 ON COUMADIN FOR ATRIAL FIBRILLATION (H): ICD-10-CM

## 2023-06-26 PROCEDURE — G0463 HOSPITAL OUTPT CLINIC VISIT: HCPCS

## 2023-06-26 PROCEDURE — 99214 OFFICE O/P EST MOD 30 MIN: CPT | Performed by: INTERNAL MEDICINE

## 2023-06-26 RX ORDER — FUROSEMIDE 40 MG
40 TABLET ORAL DAILY
Qty: 90 TABLET | Refills: 3 | Status: SHIPPED | OUTPATIENT
Start: 2023-06-26 | End: 2024-01-01

## 2023-06-26 RX ORDER — FUROSEMIDE 40 MG
40 TABLET ORAL 2 TIMES DAILY
Qty: 90 TABLET | Refills: 3 | Status: SHIPPED | OUTPATIENT
Start: 2023-06-26 | End: 2023-06-26

## 2023-06-26 ASSESSMENT — PAIN SCALES - GENERAL: PAINLEVEL: NO PAIN (0)

## 2023-06-26 NOTE — PATIENT INSTRUCTIONS
Thank you for allowing Dr. Nguyen and our  team to participate in your care. Please call our office at [phone] with scheduling questions or if you need to cancel or change your appointment. With any other questions or concerns you may call [facility] cardiology nurse at [phone].       If you experience chest pain, chest pressure, chest tightness, shortness of breath, fainting, lightheadedness, nausea, vomiting, or other concerning symptoms, please report to the Emergency Department or call 911. These symptoms may be emergent, and best treated in the Emergency Department.

## 2023-06-26 NOTE — PROGRESS NOTES
[facility] HEART CARE   CARDIOLOGY PROGRESS NOTE     Chief Complaint   Patient presents with     Heart Problem     3 month follow up           Diagnosis:  1.  A-fib, new on 2/12/2021. Digoxin, metoprolol, dilt, and Coumadin. H/O Amiodarone.   2.  Atrial fibrillation. H/O A. fib with RVR.  3.  Amiodarone stopped on 10/4/21.  4.  MYT-fwukzwfwi-inueyyiyz. BNP at 8292 on 2/22/2021.  5.  Peripheral edema.  6.  Hiatal hernia-moderate on 2/22/2021.  7.  Left lower lobe nodule on 2/12/2011.  8.  Hypertension-uncontrolled.  9.  Hypokalemia 2.9 on 2/13/2021-resolved.  10.  Hypomagnesia on 2/24/2021 at 1.4, normalized.  11.  Prediabetes-controlled.  12.  CKD-3.    Assessment/Plan:    1.    A-fib: Much improved on diltiazem 120 mg daily and metoprolol 200 mg tartrate twice a day.   Briefly, discussed treatment options for A-fib.  Symptoms have decreased considerably.  No changes.    2.  CHF: Suspect diastolic dysfunction.  Has mild peripheral edema.  Change Lasix from 20 mg twice a day to 40 mg daily.  3.  Hypertension: Now controlled.  Blood blood pressure today 126/68.  Continue diltiazem, Lasix, losartan, Toprol.  4.  Follow-up in 6 months.          Interval history:  Kerri has had almost daily palpitations.  She had been on metoprolol 200 mg twice a day.  At her last visit, diltiazem 120 mg was started.  She has had less symptoms.  We did discuss treatment for A-fib briefly.  However, her symptoms improved greatly.  No changes today.  Follow-up in 6 months or sooner with issues.    HPI:    Mrs. Larkin who has seen a doctor rarely up until being admitted to the hospital on 2/12/2021 for new onset A. fib.  She is presently on digoxin, metoprolol, Coumadin, and amiodarone for atrial fibrillation.  [id number] score is 4.     She also has a history of hypokalemia, hypomagnesia, suspected diastolic dysfunction with peripheral edema with a BNP of 8292, pneumonia on 2/12/2021, goiter on 2/12/2021, moderate hiatal hernia on  2/22/2021, arthritis in the spine, and hypertension.     She had been seen on a yearly basis for regular well check examinations.  On 2/12/2021, she presented to the ER as she had been dyspneic for a few days.  She was found to have new onset A. fib with rate of 120 bpm on her EKG.  She was admitted to the hospital.  She was provided with diltiazem IV twice with improved rates.  She was also placed on metoprolol and started on Xarelto 20 mg daily.  Hydrochlorothiazide and propranolol were discontinued.  She was discharged on metoprolol and diltiazem.     She was seen for 2nd time on 2/22/2021 in the ER.  She had worsening dyspnea and palpitations.  She was found to be in A. fib with RVR with rates in the 130-160's.  Blood pressure was in 130's systolically and saturations in the 90's on room air.  BNP was 8292.  She was given oral diltiazem and IV metoprolol in the ER.  She was then subsequently loaded on digoxin IV and admitted to the ICU.  IV diltiazem was titrated without much effect.  She was started on amiodarone with heart rates in the 90's to 120's.  Echo from 2/22/2021 showed her low normal EF of 50-55%.  Metoprolol 50 mg tartrate twice a day was added.  Her heart rate with activity would increase to the 140-150's.  Ultimately, she was discharged on amiodarone 200 mg twice a day, then 200 mg daily, digoxin 125  g every other day, metoprolol 50 mg tartrate twice a day, and Xarelto 20 mg daily.     Heart rate is better controlled.  But he will increase with activity.  She remains dyspneic with activity but does not complain of any significant palpitations.  Xarelto was not affordable as she is on a limited income and was ultimately converted to Coumadin.     Was also mention that she was noted have a goiter, moderate hiatal hernia, left lower lobe nodule and arthritis in the spine on 2/12/2021.      Relevant testing:  Zio patch in 4/8/2021:  Underlying rhythm was atrial fibrillation.  Hrt rate ranged from 40  bpm, maximum heart rate of 135 bmp, averaging 79 bmp.  No significant bradycardia, 2nd degree Mobitz type II or 3rd degree heart block.  x1 pause lasting 3.0 sec's at 6:12 AM on 4/9/2021  + atrial fibrillation with 100% burden.  x10 triggered events and x9 diary entries.  These corresponded to atrial fibrillation and VE.  x0 runs of VT.  x0 runs of SVT.  Rare, less than 1% of PVC's, ventricular couplets, and ventricular triplets.  0 episodes of ventricular bigeminy.  + episodes of ventricular trigeminy lasting up to 2.6 sec's.    Echocardiogram in 2/22/2021:  No pericardial effusion is present.  Borderline (EF 50-55%) reduced left ventricular function is present.  Diastolic function not assessed due to atrial fibrillation.  The right ventricle is normal size.  Mild left atrial enlargement is present.  Mild to moderate mitral insufficiency is present.  The valve leaflets are not well visualized.  Mild to moderate aortic insufficiency is present.  Mild to moderate tricuspid insufficiency is present.  Trace to mild pulmonic insufficiency is present.  The aorta root is normal.    CTA chest on 2/12/2021:  Right upper lobe and right middle lobe infiltrate  suspicious for pneumonia.     substernal goiter deviating the trachea from left to right.     11 mm in diameter nodule in the left lower lobe.     No pulmonary emboli.        ICD-10-CM    1. Persistent atrial fibrillation (H)  I48.19 Digoxin level      2. Atrial fibrillation with RVR (H)  I48.91 Digoxin level      3. New onset a-fib on 2/12/21 (H)  I48.91 Digoxin level      4. On Coumadin for atrial fibrillation (H)  I48.91     Z79.01       5. Chronic diastolic heart failure (H)  I50.32 Echocardiogram Complete     Digoxin level     furosemide (LASIX) 40 MG tablet      6. Lower extremity edema  R60.0 Echocardiogram Complete     furosemide (LASIX) 40 MG tablet      7. Elevated brain natriuretic peptide (BNP) level  R79.89 Echocardiogram Complete     furosemide (LASIX) 40  MG tablet      8. Benign essential hypertension  I10 Echocardiogram Complete      9. H/O hiatal hernia-moderate on 2/22/2021  Z87.19       10. Encounter for monitoring digoxin therapy  Z51.81 Digoxin level    Z79.899           History reviewed. No pertinent past medical history.    Past Surgical History:   Procedure Laterality Date     APPENDECTOMY       AS REMOVAL OF OVARY/TUBE(S) N/A        Allergies   Allergen Reactions     Aspirin      Heart palpitations       Current Outpatient Medications   Medication Sig Dispense Refill     albuterol (PROVENTIL) (2.5 MG/3ML) 0.083% neb solution Take 1 vial (2.5 mg) by nebulization every 4 hours as needed for shortness of breath / dyspnea or wheezing 90 mL 0     budesonide (PULMICORT) 0.5 MG/2ML neb solution        digoxin (LANOXIN) 125 MCG tablet TAKE 1 TABLET BY MOUTH EVERY OTHER DAY 45 tablet 3     diltiazem ER (TIAZAC) 120 MG 24 hr ER beaded capsule Take 1 capsule (120 mg) by mouth daily 90 capsule 3     furosemide (LASIX) 40 MG tablet Take 1 tablet (40 mg) by mouth 2 times daily 90 tablet 3     gentamicin (GARAMYCIN) 0.3 % ophthalmic solution INSTILL 2 DROPS INTO EACH EYE EVERY 4 HOURS AS NEEDED FOR DRY EYES       ipratropium - albuterol 0.5 mg/2.5 mg/3 mL (DUONEB) 0.5-2.5 (3) MG/3ML neb solution Take 3 mLs by nebulization every 4 hours as needed       losartan (COZAAR) 50 MG tablet Take 1 tablet (50 mg) by mouth daily 90 tablet 3     MECLIZINE HCL PO Take 25 mg by mouth as needed for dizziness       metoprolol tartrate (LOPRESSOR) 100 MG tablet Take 2 tablets by mouth twice daily 360 tablet 3     warfarin ANTICOAGULANT (COUMADIN) 5 MG tablet Take 5 mg by mouth daily Monday, Wednesday, Friday take 1/2 tablet  Tuesday, Thursday, Saturday, Sunday take 1 tablet         Social History     Socioeconomic History     Marital status:      Spouse name: Not on file     Number of children: Not on file     Years of education: Not on file     Highest education level: Not on  "file   Occupational History     Not on file   Tobacco Use     Smoking status: Never     Smokeless tobacco: Never   Substance and Sexual Activity     Alcohol use: Not Currently     Drug use: Never     Sexual activity: Not on file   Other Topics Concern     Not on file   Social History Narrative     Not on file     Social Determinants of Health     Financial Resource Strain: Not on file   Food Insecurity: Not on file   Transportation Needs: Not on file   Physical Activity: Not on file   Stress: Not on file   Social Connections: Not on file   Intimate Partner Violence: Not on file   Housing Stability: Not on file       LAB RESULTS:   No visits with results within 2 Month(s) from this visit.   Latest known visit with results is:   No results found for any previous visit.        Review of systems: Negative except that which was noted in the HPI.    Physical examination:  /68   Pulse 66   Temp 97.6  F (36.4  C) (Tympanic)   Ht 1.702 m (5' 7\")   Wt 81.4 kg (179 lb 6.4 oz)   SpO2 96%   BMI 28.10 kg/m      GENERAL APPEARANCE: healthy, alert and no distress  CHEST: lungs clear to auscultation - no rales, rhonchi or wheezes, no use of accessory muscles, no retractions, respirations are unlabored, normal respiratory rate  CARDIOVASCULAR: Irregular rate irregular rhythm.  Normal S1 with physiologic split S2, no S3 or S4 and no murmur, click or rub  EXTREMITIES: no clubbing, cyanosis but with mild peripheral edema    Total time spent on day of visit, including review of tests, obtaining/reviewing separately obtained history, ordering medications/tests/procedures, communicating with PCP/consultants, and documenting in electronic medical record: 30 minutes.             Thank you for allowing me to participate in the care of your patient. Please do not hesitate to contact me if you have any questions.     Abel Nguyen, DO          "

## 2023-07-24 NOTE — LETTER
July 24, 2023      Kerri Gonsales  [address] [place] [address]        Dear ,    We are writing to inform you of your test results.    Here is the result of your digoxin level.  Your digoxin level was in the appropriate range.  You should continue on your current dose of digoxin.    Resulted Orders   Digoxin level   Result Value Ref Range    Digoxin 0.6 0.6 - 2.0 ng/mL      Comment:      Therapeutic Range:  Adults: 0.6-1.2 ng/mL    The reference range for infants (less than 3 mo) is thought to be 3.0-4.0 ng/mL; infants tolerate more digoxin because they have more binding sites and an increased metabolic rate.       If you have any questions or concerns, please call the clinic at the number listed above.       Sincerely,      Abel Nguyen, DO

## 2024-01-01 ENCOUNTER — TELEPHONE (OUTPATIENT)
Dept: INTERVENTIONAL RADIOLOGY/VASCULAR | Facility: HOSPITAL | Age: 79
End: 2024-01-01

## 2024-01-01 ENCOUNTER — ANESTHESIA EVENT (OUTPATIENT)
Dept: SURGERY | Facility: HOSPITAL | Age: 79
End: 2024-01-01
Payer: MEDICARE

## 2024-01-01 ENCOUNTER — APPOINTMENT (OUTPATIENT)
Dept: CARDIOLOGY | Facility: HOSPITAL | Age: 79
DRG: 754 | End: 2024-01-01
Attending: INTERNAL MEDICINE
Payer: MEDICARE

## 2024-01-01 ENCOUNTER — TELEPHONE (OUTPATIENT)
Dept: CARE COORDINATION | Facility: OTHER | Age: 79
End: 2024-01-01

## 2024-01-01 ENCOUNTER — HOSPITAL ENCOUNTER (EMERGENCY)
Facility: HOSPITAL | Age: 79
Discharge: ANOTHER HOSPITAL | End: 2024-04-12
Attending: STUDENT IN AN ORGANIZED HEALTH CARE EDUCATION/TRAINING PROGRAM | Admitting: STUDENT IN AN ORGANIZED HEALTH CARE EDUCATION/TRAINING PROGRAM
Payer: MEDICARE

## 2024-01-01 ENCOUNTER — TRANSFERRED RECORDS (OUTPATIENT)
Dept: HEALTH INFORMATION MANAGEMENT | Facility: CLINIC | Age: 79
End: 2024-01-01

## 2024-01-01 ENCOUNTER — HOSPITAL ENCOUNTER (OUTPATIENT)
Dept: MRI IMAGING | Facility: HOSPITAL | Age: 79
Discharge: HOME | End: 2024-05-03
Attending: INTERNAL MEDICINE
Payer: MEDICARE

## 2024-01-01 ENCOUNTER — APPOINTMENT (OUTPATIENT)
Dept: GENERAL RADIOLOGY | Facility: HOSPITAL | Age: 79
DRG: 754 | End: 2024-01-01
Attending: INTERNAL MEDICINE
Payer: MEDICARE

## 2024-01-01 ENCOUNTER — ONCOLOGY VISIT (OUTPATIENT)
Dept: ONCOLOGY | Facility: OTHER | Age: 79
End: 2024-01-01
Attending: INTERNAL MEDICINE
Payer: COMMERCIAL

## 2024-01-01 ENCOUNTER — DOCUMENTATION ONLY (OUTPATIENT)
Dept: INFUSION THERAPY | Facility: OTHER | Age: 79
End: 2024-01-01

## 2024-01-01 ENCOUNTER — OFFICE VISIT (OUTPATIENT)
Dept: OBGYN | Facility: OTHER | Age: 79
End: 2024-01-01
Attending: OBSTETRICS & GYNECOLOGY
Payer: COMMERCIAL

## 2024-01-01 ENCOUNTER — HOSPITAL ENCOUNTER (OUTPATIENT)
Facility: HOSPITAL | Age: 79
Discharge: HOME | End: 2024-05-21
Attending: RADIOLOGY | Admitting: RADIOLOGY
Payer: MEDICARE

## 2024-01-01 ENCOUNTER — HOSPITAL ENCOUNTER (OUTPATIENT)
Dept: CT IMAGING | Facility: HOSPITAL | Age: 79
Discharge: HOME | End: 2024-05-03
Attending: INTERNAL MEDICINE
Payer: MEDICARE

## 2024-01-01 ENCOUNTER — HOSPITAL ENCOUNTER (OUTPATIENT)
Dept: CT IMAGING | Facility: HOSPITAL | Age: 79
Discharge: HOME | End: 2024-05-21
Attending: INTERNAL MEDICINE
Payer: MEDICARE

## 2024-01-01 ENCOUNTER — MEDICAL CORRESPONDENCE (OUTPATIENT)
Dept: PET IMAGING | Facility: HOSPITAL | Age: 79
End: 2024-01-01

## 2024-01-01 ENCOUNTER — APPOINTMENT (OUTPATIENT)
Dept: GENERAL RADIOLOGY | Facility: HOSPITAL | Age: 79
End: 2024-01-01
Attending: INTERNAL MEDICINE
Payer: MEDICARE

## 2024-01-01 ENCOUNTER — CARE COORDINATION (OUTPATIENT)
Dept: CARE COORDINATION | Facility: OTHER | Age: 79
End: 2024-01-01

## 2024-01-01 ENCOUNTER — TELEPHONE (OUTPATIENT)
Dept: PET IMAGING | Facility: HOSPITAL | Age: 79
End: 2024-01-01

## 2024-01-01 ENCOUNTER — MEDICAL CORRESPONDENCE (OUTPATIENT)
Dept: HEALTH INFORMATION MANAGEMENT | Facility: HOSPITAL | Age: 79
End: 2024-01-01

## 2024-01-01 ENCOUNTER — TELEPHONE (OUTPATIENT)
Dept: SURGERY | Facility: OTHER | Age: 79
End: 2024-01-01

## 2024-01-01 ENCOUNTER — PATIENT OUTREACH (OUTPATIENT)
Dept: ONCOLOGY | Facility: OTHER | Age: 79
End: 2024-01-01

## 2024-01-01 ENCOUNTER — TELEPHONE (OUTPATIENT)
Dept: ONCOLOGY | Facility: OTHER | Age: 79
End: 2024-01-01

## 2024-01-01 ENCOUNTER — APPOINTMENT (OUTPATIENT)
Dept: CT IMAGING | Facility: HOSPITAL | Age: 79
End: 2024-01-01
Attending: STUDENT IN AN ORGANIZED HEALTH CARE EDUCATION/TRAINING PROGRAM
Payer: MEDICARE

## 2024-01-01 ENCOUNTER — ONCOLOGY VISIT (OUTPATIENT)
Dept: ONCOLOGY | Facility: OTHER | Age: 79
End: 2024-01-01
Attending: NURSE PRACTITIONER
Payer: COMMERCIAL

## 2024-01-01 ENCOUNTER — INFUSION THERAPY VISIT (OUTPATIENT)
Dept: INFUSION THERAPY | Facility: OTHER | Age: 79
DRG: 754 | End: 2024-01-01
Attending: INTERNAL MEDICINE
Payer: MEDICARE

## 2024-01-01 ENCOUNTER — LAB (OUTPATIENT)
Dept: LAB | Facility: HOSPITAL | Age: 79
End: 2024-01-01
Attending: SURGERY
Payer: MEDICARE

## 2024-01-01 ENCOUNTER — HOSPITAL ENCOUNTER (OUTPATIENT)
Facility: HOSPITAL | Age: 79
Discharge: HOME | End: 2024-05-07
Attending: SURGERY | Admitting: SURGERY
Payer: MEDICARE

## 2024-01-01 ENCOUNTER — HOSPITAL ENCOUNTER (OUTPATIENT)
Facility: HOSPITAL | Age: 79
End: 2024-01-01
Attending: RADIOLOGY | Admitting: RADIOLOGY
Payer: MEDICARE

## 2024-01-01 ENCOUNTER — HOSPITAL ENCOUNTER (EMERGENCY)
Facility: HOSPITAL | Age: 79
Discharge: ANOTHER HOSPITAL | End: 2024-03-29
Attending: INTERNAL MEDICINE | Admitting: INTERNAL MEDICINE
Payer: MEDICARE

## 2024-01-01 ENCOUNTER — PREP FOR PROCEDURE (OUTPATIENT)
Dept: SURGERY | Facility: OTHER | Age: 79
End: 2024-01-01

## 2024-01-01 ENCOUNTER — OFFICE VISIT (OUTPATIENT)
Dept: CARDIOLOGY | Facility: OTHER | Age: 79
End: 2024-01-01
Attending: INTERNAL MEDICINE
Payer: MEDICARE

## 2024-01-01 ENCOUNTER — APPOINTMENT (OUTPATIENT)
Dept: GENERAL RADIOLOGY | Facility: HOSPITAL | Age: 79
End: 2024-01-01
Attending: STUDENT IN AN ORGANIZED HEALTH CARE EDUCATION/TRAINING PROGRAM
Payer: MEDICARE

## 2024-01-01 ENCOUNTER — APPOINTMENT (OUTPATIENT)
Dept: GENERAL RADIOLOGY | Facility: HOSPITAL | Age: 79
End: 2024-01-01
Attending: PHYSICIAN ASSISTANT
Payer: MEDICARE

## 2024-01-01 ENCOUNTER — HOSPITAL ENCOUNTER (EMERGENCY)
Facility: HOSPITAL | Age: 79
Discharge: HOME | End: 2024-06-17
Attending: STUDENT IN AN ORGANIZED HEALTH CARE EDUCATION/TRAINING PROGRAM | Admitting: STUDENT IN AN ORGANIZED HEALTH CARE EDUCATION/TRAINING PROGRAM
Payer: MEDICARE

## 2024-01-01 ENCOUNTER — APPOINTMENT (OUTPATIENT)
Dept: CT IMAGING | Facility: HOSPITAL | Age: 79
DRG: 754 | End: 2024-01-01
Attending: STUDENT IN AN ORGANIZED HEALTH CARE EDUCATION/TRAINING PROGRAM
Payer: MEDICARE

## 2024-01-01 ENCOUNTER — HOSPITAL ENCOUNTER (OUTPATIENT)
Dept: PET IMAGING | Facility: HOSPITAL | Age: 79
Discharge: HOME | End: 2024-05-08
Attending: INTERNAL MEDICINE | Admitting: INTERNAL MEDICINE
Payer: MEDICARE

## 2024-01-01 ENCOUNTER — HOSPITAL ENCOUNTER (OUTPATIENT)
Dept: ULTRASOUND IMAGING | Facility: HOSPITAL | Age: 79
Discharge: HOME | End: 2024-04-29
Attending: INTERNAL MEDICINE | Admitting: INTERNAL MEDICINE
Payer: MEDICARE

## 2024-01-01 ENCOUNTER — APPOINTMENT (OUTPATIENT)
Dept: CT IMAGING | Facility: HOSPITAL | Age: 79
End: 2024-01-01
Attending: INTERNAL MEDICINE
Payer: MEDICARE

## 2024-01-01 ENCOUNTER — HOSPITAL ENCOUNTER (INPATIENT)
Facility: HOSPITAL | Age: 79
LOS: 6 days | DRG: 754 | End: 2024-06-27
Attending: STUDENT IN AN ORGANIZED HEALTH CARE EDUCATION/TRAINING PROGRAM | Admitting: HOSPITALIST
Payer: MEDICARE

## 2024-01-01 ENCOUNTER — ANESTHESIA (OUTPATIENT)
Dept: SURGERY | Facility: HOSPITAL | Age: 79
End: 2024-01-01
Payer: MEDICARE

## 2024-01-01 VITALS
OXYGEN SATURATION: 94 % | BODY MASS INDEX: 26.68 KG/M2 | DIASTOLIC BLOOD PRESSURE: 69 MMHG | RESPIRATION RATE: 18 BRPM | WEIGHT: 170 LBS | HEIGHT: 67 IN | TEMPERATURE: 98.8 F | HEART RATE: 78 BPM | SYSTOLIC BLOOD PRESSURE: 127 MMHG

## 2024-01-01 VITALS
HEIGHT: 66 IN | HEART RATE: 70 BPM | BODY MASS INDEX: 29.09 KG/M2 | RESPIRATION RATE: 16 BRPM | DIASTOLIC BLOOD PRESSURE: 78 MMHG | WEIGHT: 181 LBS | SYSTOLIC BLOOD PRESSURE: 148 MMHG

## 2024-01-01 VITALS
HEIGHT: 64 IN | WEIGHT: 170.86 LBS | OXYGEN SATURATION: 98 % | TEMPERATURE: 97.7 F | SYSTOLIC BLOOD PRESSURE: 81 MMHG | BODY MASS INDEX: 29.17 KG/M2 | DIASTOLIC BLOOD PRESSURE: 44 MMHG

## 2024-01-01 VITALS
DIASTOLIC BLOOD PRESSURE: 52 MMHG | SYSTOLIC BLOOD PRESSURE: 138 MMHG | BODY MASS INDEX: 27.29 KG/M2 | WEIGHT: 169.09 LBS | TEMPERATURE: 98 F | OXYGEN SATURATION: 98 % | HEART RATE: 73 BPM

## 2024-01-01 VITALS
SYSTOLIC BLOOD PRESSURE: 118 MMHG | BODY MASS INDEX: 27.06 KG/M2 | HEIGHT: 67 IN | DIASTOLIC BLOOD PRESSURE: 72 MMHG | HEART RATE: 63 BPM | OXYGEN SATURATION: 97 % | TEMPERATURE: 98.4 F | WEIGHT: 172.4 LBS

## 2024-01-01 VITALS
RESPIRATION RATE: 15 BRPM | BODY MASS INDEX: 27.11 KG/M2 | TEMPERATURE: 98.6 F | SYSTOLIC BLOOD PRESSURE: 120 MMHG | HEART RATE: 81 BPM | WEIGHT: 167.99 LBS | DIASTOLIC BLOOD PRESSURE: 64 MMHG | OXYGEN SATURATION: 94 %

## 2024-01-01 VITALS
SYSTOLIC BLOOD PRESSURE: 102 MMHG | TEMPERATURE: 98.6 F | HEART RATE: 77 BPM | OXYGEN SATURATION: 93 % | DIASTOLIC BLOOD PRESSURE: 52 MMHG

## 2024-01-01 VITALS
HEIGHT: 66 IN | HEART RATE: 81 BPM | OXYGEN SATURATION: 97 % | SYSTOLIC BLOOD PRESSURE: 126 MMHG | BODY MASS INDEX: 29.09 KG/M2 | WEIGHT: 181 LBS | DIASTOLIC BLOOD PRESSURE: 64 MMHG

## 2024-01-01 VITALS
BODY MASS INDEX: 28.15 KG/M2 | SYSTOLIC BLOOD PRESSURE: 137 MMHG | DIASTOLIC BLOOD PRESSURE: 58 MMHG | HEART RATE: 87 BPM | HEIGHT: 64 IN | RESPIRATION RATE: 18 BRPM | OXYGEN SATURATION: 93 % | TEMPERATURE: 97.6 F | WEIGHT: 164.9 LBS

## 2024-01-01 VITALS
TEMPERATURE: 97.8 F | HEIGHT: 66 IN | RESPIRATION RATE: 16 BRPM | DIASTOLIC BLOOD PRESSURE: 65 MMHG | OXYGEN SATURATION: 96 % | WEIGHT: 170 LBS | BODY MASS INDEX: 27.32 KG/M2 | SYSTOLIC BLOOD PRESSURE: 150 MMHG

## 2024-01-01 VITALS
HEART RATE: 88 BPM | TEMPERATURE: 98.5 F | SYSTOLIC BLOOD PRESSURE: 154 MMHG | OXYGEN SATURATION: 94 % | DIASTOLIC BLOOD PRESSURE: 77 MMHG | RESPIRATION RATE: 20 BRPM

## 2024-01-01 VITALS
BODY MASS INDEX: 27 KG/M2 | WEIGHT: 172.4 LBS | SYSTOLIC BLOOD PRESSURE: 157 MMHG | HEART RATE: 83 BPM | TEMPERATURE: 99.4 F | RESPIRATION RATE: 16 BRPM | DIASTOLIC BLOOD PRESSURE: 81 MMHG | OXYGEN SATURATION: 96 %

## 2024-01-01 VITALS
HEART RATE: 81 BPM | RESPIRATION RATE: 20 BRPM | HEIGHT: 64 IN | SYSTOLIC BLOOD PRESSURE: 140 MMHG | OXYGEN SATURATION: 91 % | TEMPERATURE: 98.3 F | WEIGHT: 165.12 LBS | BODY MASS INDEX: 28.19 KG/M2 | DIASTOLIC BLOOD PRESSURE: 80 MMHG

## 2024-01-01 DIAGNOSIS — R79.89 ELEVATED BRAIN NATRIURETIC PEPTIDE (BNP) LEVEL: ICD-10-CM

## 2024-01-01 DIAGNOSIS — Z51.81 ENCOUNTER FOR MONITORING DIGOXIN THERAPY: ICD-10-CM

## 2024-01-01 DIAGNOSIS — C34.12 MALIGNANT NEOPLASM OF UPPER LOBE, LEFT BRONCHUS OR LUNG (H): Primary | ICD-10-CM

## 2024-01-01 DIAGNOSIS — E83.42 HYPOMAGNESEMIA: ICD-10-CM

## 2024-01-01 DIAGNOSIS — I50.9 CHF (CONGESTIVE HEART FAILURE) (H): Primary | ICD-10-CM

## 2024-01-01 DIAGNOSIS — C78.30: ICD-10-CM

## 2024-01-01 DIAGNOSIS — C78.30: Primary | ICD-10-CM

## 2024-01-01 DIAGNOSIS — I48.91 ATRIAL FIBRILLATION WITH RVR (H): Primary | ICD-10-CM

## 2024-01-01 DIAGNOSIS — E87.6 HYPOKALEMIA: ICD-10-CM

## 2024-01-01 DIAGNOSIS — C49.9 LEIOMYOSARCOMA (H): Primary | ICD-10-CM

## 2024-01-01 DIAGNOSIS — R73.9 HYPERGLYCEMIA: ICD-10-CM

## 2024-01-01 DIAGNOSIS — R73.03 PREDIABETES: ICD-10-CM

## 2024-01-01 DIAGNOSIS — C34.12 MALIGNANT NEOPLASM OF UPPER LOBE, LEFT BRONCHUS OR LUNG (H): ICD-10-CM

## 2024-01-01 DIAGNOSIS — I50.32 CHRONIC DIASTOLIC HEART FAILURE (H): Primary | ICD-10-CM

## 2024-01-01 DIAGNOSIS — N18.31 STAGE 3A CHRONIC KIDNEY DISEASE (H): ICD-10-CM

## 2024-01-01 DIAGNOSIS — Z79.899 ENCOUNTER FOR MONITORING DIGOXIN THERAPY: ICD-10-CM

## 2024-01-01 DIAGNOSIS — I48.19 PERSISTENT ATRIAL FIBRILLATION (H): Primary | ICD-10-CM

## 2024-01-01 DIAGNOSIS — R60.0 LOWER EXTREMITY EDEMA: ICD-10-CM

## 2024-01-01 DIAGNOSIS — I48.19 PERSISTENT ATRIAL FIBRILLATION (H): ICD-10-CM

## 2024-01-01 DIAGNOSIS — E83.42 HYPOMAGNESEMIA: Primary | ICD-10-CM

## 2024-01-01 DIAGNOSIS — C80.1 PRIMARY CANCER OF UNKNOWN SITE (H): ICD-10-CM

## 2024-01-01 DIAGNOSIS — C78.01 MALIGNANT NEOPLASM METASTATIC TO BOTH LUNGS (H): ICD-10-CM

## 2024-01-01 DIAGNOSIS — N95.0 POSTMENOPAUSAL BLEEDING: Primary | ICD-10-CM

## 2024-01-01 DIAGNOSIS — I10 BENIGN ESSENTIAL HYPERTENSION: ICD-10-CM

## 2024-01-01 DIAGNOSIS — Z79.01 ON COUMADIN FOR ATRIAL FIBRILLATION (H): ICD-10-CM

## 2024-01-01 DIAGNOSIS — Z13.220 LIPID SCREENING: ICD-10-CM

## 2024-01-01 DIAGNOSIS — R04.2 HEMOPTYSIS: ICD-10-CM

## 2024-01-01 DIAGNOSIS — R09.02 HYPOXIA: ICD-10-CM

## 2024-01-01 DIAGNOSIS — R06.02 SHORTNESS OF BREATH: ICD-10-CM

## 2024-01-01 DIAGNOSIS — I48.91 ON COUMADIN FOR ATRIAL FIBRILLATION (H): ICD-10-CM

## 2024-01-01 DIAGNOSIS — I50.32 CHRONIC DIASTOLIC HEART FAILURE (H): ICD-10-CM

## 2024-01-01 DIAGNOSIS — C78.02 MALIGNANT NEOPLASM METASTATIC TO BOTH LUNGS (H): ICD-10-CM

## 2024-01-01 DIAGNOSIS — I48.91 NEW ONSET A-FIB (H): ICD-10-CM

## 2024-01-01 DIAGNOSIS — I48.91 ATRIAL FIBRILLATION WITH RVR (H): ICD-10-CM

## 2024-01-01 DIAGNOSIS — N17.9 ACUTE KIDNEY INJURY (H): ICD-10-CM

## 2024-01-01 DIAGNOSIS — C79.9 METASTATIC CANCER (H): ICD-10-CM

## 2024-01-01 LAB
ACANTHOCYTES BLD QL SMEAR: NORMAL
ALBUMIN SERPL BCG-MCNC: 2.6 G/DL (ref 3.5–5.2)
ALBUMIN SERPL BCG-MCNC: 2.8 G/DL (ref 3.5–5.2)
ALBUMIN SERPL BCG-MCNC: 3.3 G/DL (ref 3.5–5.2)
ALBUMIN SERPL BCG-MCNC: 3.8 G/DL (ref 3.5–5.2)
ALBUMIN SERPL BCG-MCNC: 4 G/DL (ref 3.5–5.2)
ALBUMIN SERPL BCG-MCNC: 4.3 G/DL (ref 3.5–5.2)
ALBUMIN UR-MCNC: 20 MG/DL
ALBUMIN UR-MCNC: NEGATIVE MG/DL
ALLEN'S TEST: YES
ALLEN'S TEST: YES
ALP SERPL-CCNC: 56 U/L (ref 40–150)
ALP SERPL-CCNC: 59 U/L (ref 40–150)
ALP SERPL-CCNC: 60 U/L (ref 40–150)
ALP SERPL-CCNC: 63 U/L (ref 40–150)
ALP SERPL-CCNC: 63 U/L (ref 40–150)
ALP SERPL-CCNC: 66 U/L (ref 40–150)
ALP SERPL-CCNC: 71 U/L (ref 40–150)
ALP SERPL-CCNC: 98 U/L (ref 40–150)
ALT SERPL W P-5'-P-CCNC: 11 U/L (ref 0–50)
ALT SERPL W P-5'-P-CCNC: 13 U/L (ref 0–50)
ALT SERPL W P-5'-P-CCNC: 13 U/L (ref 0–50)
ALT SERPL W P-5'-P-CCNC: 14 U/L (ref 0–50)
ALT SERPL W P-5'-P-CCNC: 20 U/L (ref 0–50)
ALT SERPL W P-5'-P-CCNC: 20 U/L (ref 0–50)
ALT SERPL W P-5'-P-CCNC: 22 U/L (ref 0–50)
ALT SERPL W P-5'-P-CCNC: 27 U/L (ref 0–50)
ALT SERPL-CCNC: 17 U/L (ref 18–65)
ANION GAP SERPL CALCULATED.3IONS-SCNC: 10 MMOL/L (ref 7–15)
ANION GAP SERPL CALCULATED.3IONS-SCNC: 11 MMOL/L (ref 7–15)
ANION GAP SERPL CALCULATED.3IONS-SCNC: 12 MMOL/L (ref 7–15)
ANION GAP SERPL CALCULATED.3IONS-SCNC: 13 MMOL/L (ref 7–15)
ANION GAP SERPL CALCULATED.3IONS-SCNC: 13 MMOL/L (ref 7–15)
ANION GAP SERPL CALCULATED.3IONS-SCNC: 14 MMOL/L (ref 7–15)
ANION GAP SERPL CALCULATED.3IONS-SCNC: 14 MMOL/L (ref 7–15)
ANION GAP SERPL CALCULATED.3IONS-SCNC: 15 MMOL/L (ref 7–15)
ANION GAP SERPL CALCULATED.3IONS-SCNC: 15 MMOL/L (ref 7–15)
ANION GAP SERPL CALCULATED.3IONS-SCNC: 16 MMOL/L (ref 7–15)
ANION GAP SERPL CALCULATED.3IONS-SCNC: 16 MMOL/L (ref 7–15)
ANION GAP SERPL CALCULATED.3IONS-SCNC: 17 MMOL/L (ref 7–15)
APPEARANCE UR: ABNORMAL
APPEARANCE UR: CLEAR
AST SERPL W P-5'-P-CCNC: 25 U/L (ref 0–45)
AST SERPL W P-5'-P-CCNC: 25 U/L (ref 0–45)
AST SERPL W P-5'-P-CCNC: 26 U/L (ref 0–45)
AST SERPL W P-5'-P-CCNC: 29 U/L (ref 0–45)
AST SERPL W P-5'-P-CCNC: 32 U/L (ref 0–45)
AST SERPL W P-5'-P-CCNC: 35 U/L (ref 0–45)
AST SERPL W P-5'-P-CCNC: 38 U/L (ref 0–45)
AST SERPL W P-5'-P-CCNC: 39 U/L (ref 0–45)
AST SERPL-CCNC: 23 U/L (ref 10–30)
ATRIAL RATE - MUSE: NORMAL BPM
AUER BODIES BLD QL SMEAR: NORMAL
BACTERIA #/AREA URNS HPF: ABNORMAL /HPF
BACTERIA UR CULT: NORMAL
BASE EXCESS BLDA CALC-SCNC: -2.6 MMOL/L (ref -3–3)
BASE EXCESS BLDA CALC-SCNC: -4.2 MMOL/L (ref -3–3)
BASE EXCESS BLDV CALC-SCNC: 6.3 MMOL/L (ref -3–3)
BASO STIPL BLD QL SMEAR: NORMAL
BASOPHILS # BLD AUTO: 0 10E3/UL (ref 0–0.2)
BASOPHILS # BLD AUTO: 0.1 10E3/UL (ref 0–0.2)
BASOPHILS # BLD MANUAL: 0 10E3/UL (ref 0–0.2)
BASOPHILS NFR BLD AUTO: 0 %
BASOPHILS NFR BLD AUTO: 1 %
BASOPHILS NFR BLD MANUAL: 0 %
BILIRUB SERPL-MCNC: 0.4 MG/DL
BILIRUB SERPL-MCNC: 0.6 MG/DL
BILIRUB SERPL-MCNC: 0.8 MG/DL
BILIRUB UR QL STRIP: NEGATIVE
BILIRUB UR QL STRIP: NEGATIVE
BITE CELLS BLD QL SMEAR: NORMAL
BLISTER CELLS BLD QL SMEAR: NORMAL
BUN SERPL-MCNC: 15.4 MG/DL (ref 8–23)
BUN SERPL-MCNC: 16.5 MG/DL (ref 8–23)
BUN SERPL-MCNC: 17.8 MG/DL (ref 8–23)
BUN SERPL-MCNC: 18.2 MG/DL (ref 8–23)
BUN SERPL-MCNC: 18.3 MG/DL (ref 8–23)
BUN SERPL-MCNC: 21.1 MG/DL (ref 8–23)
BUN SERPL-MCNC: 33 MG/DL (ref 8–23)
BUN SERPL-MCNC: 37.4 MG/DL (ref 8–23)
BUN SERPL-MCNC: 37.8 MG/DL (ref 8–23)
BUN SERPL-MCNC: 40.7 MG/DL (ref 8–23)
BUN SERPL-MCNC: 56.6 MG/DL (ref 8–23)
BUN SERPL-MCNC: 71.2 MG/DL (ref 8–23)
BURR CELLS BLD QL SMEAR: NORMAL
CALCIUM SERPL-MCNC: 8.4 MG/DL (ref 8.8–10.2)
CALCIUM SERPL-MCNC: 8.7 MG/DL (ref 8.8–10.2)
CALCIUM SERPL-MCNC: 8.8 MG/DL (ref 8.8–10.2)
CALCIUM SERPL-MCNC: 9 MG/DL (ref 8.8–10.2)
CALCIUM SERPL-MCNC: 9.1 MG/DL (ref 8.8–10.2)
CALCIUM SERPL-MCNC: 9.3 MG/DL (ref 8.8–10.2)
CALCIUM SERPL-MCNC: 9.4 MG/DL (ref 8.8–10.2)
CALCIUM SERPL-MCNC: 9.4 MG/DL (ref 8.8–10.2)
CALCIUM SERPL-MCNC: 9.5 MG/DL (ref 8.8–10.2)
CALCIUM SERPL-MCNC: 9.5 MG/DL (ref 8.8–10.2)
CHLORIDE SERPL-SCNC: 89 MMOL/L (ref 98–107)
CHLORIDE SERPL-SCNC: 91 MMOL/L (ref 98–107)
CHLORIDE SERPL-SCNC: 92 MMOL/L (ref 98–107)
CHLORIDE SERPL-SCNC: 93 MMOL/L (ref 98–107)
CHLORIDE SERPL-SCNC: 94 MMOL/L (ref 98–107)
CHLORIDE SERPL-SCNC: 94 MMOL/L (ref 98–107)
CHLORIDE SERPL-SCNC: 95 MMOL/L (ref 98–107)
CHLORIDE SERPL-SCNC: 96 MMOL/L (ref 98–107)
CHLORIDE SERPL-SCNC: 97 MMOL/L (ref 98–107)
CHOLEST SERPL-MCNC: 172 MG/DL
COHGB MFR BLD: 91.8 % (ref 95–96)
COHGB MFR BLD: 97.2 % (ref 95–96)
COLOR UR AUTO: ABNORMAL
COLOR UR AUTO: YELLOW
CREAT SERPL-MCNC: 0.82 MG/DL (ref 0.51–0.95)
CREAT SERPL-MCNC: 0.87 MG/DL (ref 0.51–0.95)
CREAT SERPL-MCNC: 0.88 MG/DL (ref 0.51–0.95)
CREAT SERPL-MCNC: 0.95 MG/DL (ref 0.51–0.95)
CREAT SERPL-MCNC: 0.97 MG/DL (ref 0.51–0.95)
CREAT SERPL-MCNC: 1.03 MG/DL (ref 0.51–0.95)
CREAT SERPL-MCNC: 1.09 MG/DL (ref 0.51–0.95)
CREAT SERPL-MCNC: 1.14 MG/DL (ref 0.51–0.95)
CREAT SERPL-MCNC: 1.49 MG/DL (ref 0.51–0.95)
CREAT SERPL-MCNC: 1.87 MG/DL (ref 0.51–0.95)
CREAT SERPL-MCNC: 1.91 MG/DL (ref 0.51–0.95)
CREAT SERPL-MCNC: 2.8 MG/DL (ref 0.51–0.95)
CREATININE (EXTERNAL): 0.7 MG/DL (ref 0.7–1.2)
CREATININE (EXTERNAL): 0.85 MG/DL (ref 0.7–1.2)
CRP SERPL-MCNC: 10.67 MG/L
D DIMER PPP FEU-MCNC: 1.15 UG/ML FEU (ref 0–0.5)
DACRYOCYTES BLD QL SMEAR: NORMAL
DEPRECATED HCO3 PLAS-SCNC: 21 MMOL/L (ref 22–29)
DEPRECATED HCO3 PLAS-SCNC: 23 MMOL/L (ref 22–29)
DEPRECATED HCO3 PLAS-SCNC: 24 MMOL/L (ref 22–29)
DEPRECATED HCO3 PLAS-SCNC: 25 MMOL/L (ref 22–29)
DEPRECATED HCO3 PLAS-SCNC: 26 MMOL/L (ref 22–29)
DEPRECATED HCO3 PLAS-SCNC: 27 MMOL/L (ref 22–29)
DEPRECATED HCO3 PLAS-SCNC: 28 MMOL/L (ref 22–29)
DEPRECATED HCO3 PLAS-SCNC: 28 MMOL/L (ref 22–29)
DEPRECATED HCO3 PLAS-SCNC: 30 MMOL/L (ref 22–29)
DEPRECATED HCO3 PLAS-SCNC: 30 MMOL/L (ref 22–29)
DIASTOLIC BLOOD PRESSURE - MUSE: NORMAL MMHG
DIGOXIN SERPL-MCNC: 0.5 NG/ML (ref 0.6–2)
DIGOXIN SERPL-MCNC: 0.5 NG/ML (ref 0.6–2)
DIGOXIN SERPL-MCNC: 1.1 NG/ML (ref 0.6–2)
DIGOXIN SERPL-MCNC: 1.2 NG/ML (ref 0.6–2)
EGFRCR SERPLBLD CKD-EPI 2021: 16 ML/MIN/1.73M2
EGFRCR SERPLBLD CKD-EPI 2021: 26 ML/MIN/1.73M2
EGFRCR SERPLBLD CKD-EPI 2021: 27 ML/MIN/1.73M2
EGFRCR SERPLBLD CKD-EPI 2021: 35 ML/MIN/1.73M2
EGFRCR SERPLBLD CKD-EPI 2021: 48 ML/MIN/1.73M2
EGFRCR SERPLBLD CKD-EPI 2021: 51 ML/MIN/1.73M2
EGFRCR SERPLBLD CKD-EPI 2021: 54 ML/MIN/1.73M2
EGFRCR SERPLBLD CKD-EPI 2021: 58 ML/MIN/1.73M2
EGFRCR SERPLBLD CKD-EPI 2021: 60 ML/MIN/1.73M2
EGFRCR SERPLBLD CKD-EPI 2021: 66 ML/MIN/1.73M2
EGFRCR SERPLBLD CKD-EPI 2021: 67 ML/MIN/1.73M2
EGFRCR SERPLBLD CKD-EPI 2021: 72 ML/MIN/1.73M2
ELLIPTOCYTES BLD QL SMEAR: NORMAL
EOSINOPHIL # BLD AUTO: 0 10E3/UL (ref 0–0.7)
EOSINOPHIL # BLD AUTO: 0.1 10E3/UL (ref 0–0.7)
EOSINOPHIL # BLD AUTO: 0.2 10E3/UL (ref 0–0.7)
EOSINOPHIL # BLD MANUAL: 0 10E3/UL (ref 0–0.7)
EOSINOPHIL NFR BLD AUTO: 0 %
EOSINOPHIL NFR BLD AUTO: 1 %
EOSINOPHIL NFR BLD AUTO: 3 %
EOSINOPHIL NFR BLD MANUAL: 0 %
ERYTHROCYTE [DISTWIDTH] IN BLOOD BY AUTOMATED COUNT: 13.7 % (ref 10–15)
ERYTHROCYTE [DISTWIDTH] IN BLOOD BY AUTOMATED COUNT: 13.8 % (ref 10–15)
ERYTHROCYTE [DISTWIDTH] IN BLOOD BY AUTOMATED COUNT: 13.8 % (ref 10–15)
ERYTHROCYTE [DISTWIDTH] IN BLOOD BY AUTOMATED COUNT: 14.6 % (ref 10–15)
ERYTHROCYTE [DISTWIDTH] IN BLOOD BY AUTOMATED COUNT: 14.7 % (ref 10–15)
ERYTHROCYTE [DISTWIDTH] IN BLOOD BY AUTOMATED COUNT: 14.8 % (ref 10–15)
ERYTHROCYTE [DISTWIDTH] IN BLOOD BY AUTOMATED COUNT: 14.8 % (ref 10–15)
ERYTHROCYTE [DISTWIDTH] IN BLOOD BY AUTOMATED COUNT: 15 % (ref 10–15)
ERYTHROCYTE [DISTWIDTH] IN BLOOD BY AUTOMATED COUNT: 15 % (ref 10–15)
EST. AVERAGE GLUCOSE BLD GHB EST-MCNC: 177 MG/DL
FASTING STATUS PATIENT QL REPORTED: NO
FLUAV RNA SPEC QL NAA+PROBE: NEGATIVE
FLUAV RNA SPEC QL NAA+PROBE: NEGATIVE
FLUBV RNA RESP QL NAA+PROBE: NEGATIVE
FLUBV RNA RESP QL NAA+PROBE: NEGATIVE
FRAGMENTS BLD QL SMEAR: NORMAL
GFR ESTIMATED (EXTERNAL): 69 ML/MIN/1.73M2
GFR ESTIMATED (EXTERNAL): 87 ML/MIN/1.73M2
GIANT PLATELETS BLD QL SMEAR: NORMAL
GLUCOSE (EXTERNAL): 170 MG/DL (ref 70–100)
GLUCOSE (EXTERNAL): 205 MG/DL (ref 70–100)
GLUCOSE BLDC GLUCOMTR-MCNC: 176 MG/DL (ref 70–99)
GLUCOSE SERPL-MCNC: 157 MG/DL (ref 70–99)
GLUCOSE SERPL-MCNC: 168 MG/DL (ref 70–99)
GLUCOSE SERPL-MCNC: 176 MG/DL (ref 70–99)
GLUCOSE SERPL-MCNC: 177 MG/DL (ref 70–99)
GLUCOSE SERPL-MCNC: 183 MG/DL (ref 70–99)
GLUCOSE SERPL-MCNC: 188 MG/DL (ref 70–99)
GLUCOSE SERPL-MCNC: 192 MG/DL (ref 70–99)
GLUCOSE SERPL-MCNC: 195 MG/DL (ref 70–99)
GLUCOSE SERPL-MCNC: 203 MG/DL (ref 70–99)
GLUCOSE SERPL-MCNC: 207 MG/DL (ref 70–99)
GLUCOSE SERPL-MCNC: 230 MG/DL (ref 70–99)
GLUCOSE SERPL-MCNC: 299 MG/DL (ref 70–99)
GLUCOSE UR STRIP-MCNC: NEGATIVE MG/DL
GLUCOSE UR STRIP-MCNC: NEGATIVE MG/DL
HBA1C MFR BLD: 7.8 %
HCO3 BLD-SCNC: 24 MMOL/L (ref 21–28)
HCO3 BLD-SCNC: 27 MMOL/L (ref 21–28)
HCO3 BLDV-SCNC: 29 MMOL/L (ref 21–28)
HCT VFR BLD AUTO: 28.2 % (ref 35–47)
HCT VFR BLD AUTO: 29.3 % (ref 35–47)
HCT VFR BLD AUTO: 29.5 % (ref 35–47)
HCT VFR BLD AUTO: 29.6 % (ref 35–47)
HCT VFR BLD AUTO: 32.4 % (ref 35–47)
HCT VFR BLD AUTO: 33.7 % (ref 35–47)
HCT VFR BLD AUTO: 33.9 % (ref 35–47)
HCT VFR BLD AUTO: 35.2 % (ref 35–47)
HCT VFR BLD AUTO: 36.2 % (ref 35–47)
HCT VFR BLD AUTO: 37 % (ref 35–47)
HCT VFR BLD AUTO: 37.2 % (ref 35–47)
HCT VFR BLD AUTO: 38 % (ref 35–47)
HDLC SERPL-MCNC: 61 MG/DL
HGB BLD-MCNC: 10.8 G/DL (ref 11.7–15.7)
HGB BLD-MCNC: 11.2 G/DL (ref 11.7–15.7)
HGB BLD-MCNC: 11.3 G/DL (ref 11.7–15.7)
HGB BLD-MCNC: 11.7 G/DL (ref 11.7–15.7)
HGB BLD-MCNC: 12.1 G/DL (ref 11.7–15.7)
HGB BLD-MCNC: 12.4 G/DL (ref 11.7–15.7)
HGB BLD-MCNC: 12.5 G/DL (ref 11.7–15.7)
HGB BLD-MCNC: 13 G/DL (ref 11.7–15.7)
HGB BLD-MCNC: 9.1 G/DL (ref 11.7–15.7)
HGB BLD-MCNC: 9.5 G/DL (ref 11.7–15.7)
HGB BLD-MCNC: 9.6 G/DL (ref 11.7–15.7)
HGB BLD-MCNC: 9.7 G/DL (ref 11.7–15.7)
HGB C CRYSTALS: NORMAL
HGB UR QL STRIP: ABNORMAL
HGB UR QL STRIP: ABNORMAL
HOLD SPECIMEN: NORMAL
HOWELL-JOLLY BOD BLD QL SMEAR: NORMAL
HYALINE CASTS: 14 /LPF
HYALINE CASTS: 77 /LPF
IMM GRANULOCYTES # BLD: 0 10E3/UL
IMM GRANULOCYTES # BLD: 0.1 10E3/UL
IMM GRANULOCYTES NFR BLD: 0 %
IMM GRANULOCYTES NFR BLD: 1 %
IMM GRANULOCYTES NFR BLD: 1 %
INR (EXTERNAL): 1.3 (ref 0.9–1.1)
INR PPP: 1.19 (ref 0.85–1.15)
INR PPP: 2.22 (ref 0.85–1.15)
INR PPP: 3.38 (ref 0.85–1.15)
INR PPP: 4.93 (ref 0.85–1.15)
INR PPP: 5.59 (ref 0.85–1.15)
INR PPP: 6.47 (ref 0.85–1.15)
INR PPP: 6.88 (ref 0.85–1.15)
INR PPP: 7.45 (ref 0.85–1.15)
INR PPP: 8.63 (ref 0.85–1.15)
INTERPRETATION ECG - MUSE: NORMAL
KETONES UR STRIP-MCNC: NEGATIVE MG/DL
KETONES UR STRIP-MCNC: NEGATIVE MG/DL
LACTATE SERPL-SCNC: 1.5 MMOL/L (ref 0.7–2)
LACTATE SERPL-SCNC: 2.3 MMOL/L (ref 0.7–2)
LDLC SERPL CALC-MCNC: 93 MG/DL
LEUKOCYTE ESTERASE UR QL STRIP: ABNORMAL
LEUKOCYTE ESTERASE UR QL STRIP: NEGATIVE
LVEF ECHO: NORMAL
LYMPHOCYTES # BLD AUTO: 0.7 10E3/UL (ref 0.8–5.3)
LYMPHOCYTES # BLD AUTO: 0.8 10E3/UL (ref 0.8–5.3)
LYMPHOCYTES # BLD AUTO: 0.9 10E3/UL (ref 0.8–5.3)
LYMPHOCYTES # BLD AUTO: 0.9 10E3/UL (ref 0.8–5.3)
LYMPHOCYTES # BLD AUTO: 1 10E3/UL (ref 0.8–5.3)
LYMPHOCYTES # BLD AUTO: 1.4 10E3/UL (ref 0.8–5.3)
LYMPHOCYTES # BLD AUTO: 1.5 10E3/UL (ref 0.8–5.3)
LYMPHOCYTES # BLD AUTO: 2.5 10E3/UL (ref 0.8–5.3)
LYMPHOCYTES # BLD MANUAL: 0.7 10E3/UL (ref 0.8–5.3)
LYMPHOCYTES NFR BLD AUTO: 11 %
LYMPHOCYTES NFR BLD AUTO: 14 %
LYMPHOCYTES NFR BLD AUTO: 14 %
LYMPHOCYTES NFR BLD AUTO: 19 %
LYMPHOCYTES NFR BLD AUTO: 22 %
LYMPHOCYTES NFR BLD AUTO: 32 %
LYMPHOCYTES NFR BLD AUTO: 8 %
LYMPHOCYTES NFR BLD AUTO: 8 %
LYMPHOCYTES NFR BLD MANUAL: 11 %
MAGNESIUM SERPL-MCNC: 1.3 MG/DL (ref 1.7–2.3)
MAGNESIUM SERPL-MCNC: 1.5 MG/DL (ref 1.7–2.3)
MAGNESIUM SERPL-MCNC: 1.6 MG/DL (ref 1.7–2.3)
MAGNESIUM SERPL-MCNC: 1.6 MG/DL (ref 1.7–2.3)
MAGNESIUM SERPL-MCNC: 1.7 MG/DL (ref 1.7–2.3)
MAGNESIUM SERPL-MCNC: 2 MG/DL (ref 1.7–2.3)
MCH RBC QN AUTO: 30.3 PG (ref 26.5–33)
MCH RBC QN AUTO: 30.4 PG (ref 26.5–33)
MCH RBC QN AUTO: 30.5 PG (ref 26.5–33)
MCH RBC QN AUTO: 30.6 PG (ref 26.5–33)
MCH RBC QN AUTO: 30.7 PG (ref 26.5–33)
MCH RBC QN AUTO: 30.9 PG (ref 26.5–33)
MCH RBC QN AUTO: 31 PG (ref 26.5–33)
MCH RBC QN AUTO: 31 PG (ref 26.5–33)
MCH RBC QN AUTO: 31.4 PG (ref 26.5–33)
MCH RBC QN AUTO: 31.4 PG (ref 26.5–33)
MCHC RBC AUTO-ENTMCNC: 32.2 G/DL (ref 31.5–36.5)
MCHC RBC AUTO-ENTMCNC: 32.3 G/DL (ref 31.5–36.5)
MCHC RBC AUTO-ENTMCNC: 32.8 G/DL (ref 31.5–36.5)
MCHC RBC AUTO-ENTMCNC: 32.8 G/DL (ref 31.5–36.5)
MCHC RBC AUTO-ENTMCNC: 33.2 G/DL (ref 31.5–36.5)
MCHC RBC AUTO-ENTMCNC: 33.2 G/DL (ref 31.5–36.5)
MCHC RBC AUTO-ENTMCNC: 33.3 G/DL (ref 31.5–36.5)
MCHC RBC AUTO-ENTMCNC: 33.3 G/DL (ref 31.5–36.5)
MCHC RBC AUTO-ENTMCNC: 33.4 G/DL (ref 31.5–36.5)
MCHC RBC AUTO-ENTMCNC: 33.5 G/DL (ref 31.5–36.5)
MCHC RBC AUTO-ENTMCNC: 33.6 G/DL (ref 31.5–36.5)
MCHC RBC AUTO-ENTMCNC: 34.2 G/DL (ref 31.5–36.5)
MCV RBC AUTO: 91 FL (ref 78–100)
MCV RBC AUTO: 91 FL (ref 78–100)
MCV RBC AUTO: 92 FL (ref 78–100)
MCV RBC AUTO: 93 FL (ref 78–100)
MCV RBC AUTO: 94 FL (ref 78–100)
MCV RBC AUTO: 94 FL (ref 78–100)
MCV RBC AUTO: 95 FL (ref 78–100)
MCV RBC AUTO: 96 FL (ref 78–100)
MONOCYTES # BLD AUTO: 0.1 10E3/UL (ref 0–1.3)
MONOCYTES # BLD AUTO: 0.1 10E3/UL (ref 0–1.3)
MONOCYTES # BLD AUTO: 0.2 10E3/UL (ref 0–1.3)
MONOCYTES # BLD AUTO: 0.5 10E3/UL (ref 0–1.3)
MONOCYTES # BLD AUTO: 0.6 10E3/UL (ref 0–1.3)
MONOCYTES # BLD AUTO: 0.6 10E3/UL (ref 0–1.3)
MONOCYTES # BLD AUTO: 0.7 10E3/UL (ref 0–1.3)
MONOCYTES # BLD AUTO: 0.8 10E3/UL (ref 0–1.3)
MONOCYTES # BLD MANUAL: 0.3 10E3/UL (ref 0–1.3)
MONOCYTES NFR BLD AUTO: 1 %
MONOCYTES NFR BLD AUTO: 1 %
MONOCYTES NFR BLD AUTO: 10 %
MONOCYTES NFR BLD AUTO: 10 %
MONOCYTES NFR BLD AUTO: 2 %
MONOCYTES NFR BLD AUTO: 7 %
MONOCYTES NFR BLD AUTO: 8 %
MONOCYTES NFR BLD AUTO: 9 %
MONOCYTES NFR BLD MANUAL: 5 %
MUCOUS THREADS #/AREA URNS LPF: PRESENT /LPF
MUCOUS THREADS #/AREA URNS LPF: PRESENT /LPF
NEUTROPHILS # BLD AUTO: 4.2 10E3/UL (ref 1.6–8.3)
NEUTROPHILS # BLD AUTO: 4.6 10E3/UL (ref 1.6–8.3)
NEUTROPHILS # BLD AUTO: 5.1 10E3/UL (ref 1.6–8.3)
NEUTROPHILS # BLD AUTO: 5.3 10E3/UL (ref 1.6–8.3)
NEUTROPHILS # BLD AUTO: 5.4 10E3/UL (ref 1.6–8.3)
NEUTROPHILS # BLD AUTO: 7.3 10E3/UL (ref 1.6–8.3)
NEUTROPHILS # BLD AUTO: 8 10E3/UL (ref 1.6–8.3)
NEUTROPHILS # BLD AUTO: 9.1 10E3/UL (ref 1.6–8.3)
NEUTROPHILS # BLD MANUAL: 5.7 10E3/UL (ref 1.6–8.3)
NEUTROPHILS NFR BLD AUTO: 55 %
NEUTROPHILS NFR BLD AUTO: 68 %
NEUTROPHILS NFR BLD AUTO: 71 %
NEUTROPHILS NFR BLD AUTO: 76 %
NEUTROPHILS NFR BLD AUTO: 78 %
NEUTROPHILS NFR BLD AUTO: 86 %
NEUTROPHILS NFR BLD AUTO: 91 %
NEUTROPHILS NFR BLD AUTO: 91 %
NEUTROPHILS NFR BLD MANUAL: 84 %
NEUTS HYPERSEG BLD QL SMEAR: NORMAL
NITRATE UR QL: NEGATIVE
NITRATE UR QL: NEGATIVE
NONHDLC SERPL-MCNC: 111 MG/DL
NRBC # BLD AUTO: 0 10E3/UL
NRBC BLD AUTO-RTO: 0 /100
NT-PROBNP SERPL-MCNC: 2071 PG/ML (ref 0–1800)
NT-PROBNP SERPL-MCNC: 2177 PG/ML (ref 0–1800)
NT-PROBNP SERPL-MCNC: 2603 PG/ML (ref 0–1800)
NT-PROBNP SERPL-MCNC: 4033 PG/ML (ref 0–1800)
O2/TOTAL GAS SETTING VFR VENT: 2 %
O2/TOTAL GAS SETTING VFR VENT: 32 %
O2/TOTAL GAS SETTING VFR VENT: 40 %
OXYHGB MFR BLDV: 89 % (ref 70–75)
P AXIS - MUSE: NORMAL DEGREES
PATH REPORT.COMMENTS IMP SPEC: ABNORMAL
PATH REPORT.COMMENTS IMP SPEC: YES
PATH REPORT.FINAL DX SPEC: ABNORMAL
PATH REPORT.GROSS SPEC: ABNORMAL
PATH REPORT.MICROSCOPIC SPEC OTHER STN: ABNORMAL
PATH REPORT.RELEVANT HX SPEC: ABNORMAL
PATH REV: NORMAL
PCO2 BLD: 50 MM HG (ref 35–45)
PCO2 BLD: 87 MM HG (ref 35–45)
PCO2 BLDV: 34 MM HG (ref 40–50)
PEEP: 7 CM H2O
PH BLD: 7.1 [PH] (ref 7.35–7.45)
PH BLD: 7.29 [PH] (ref 7.35–7.45)
PH BLDV: 7.54 [PH] (ref 7.32–7.43)
PH UR STRIP: 5 [PH] (ref 4.7–8)
PH UR STRIP: 6 [PH] (ref 4.7–8)
PHOSPHATE SERPL-MCNC: 4.4 MG/DL (ref 2.5–4.5)
PHOTO IMAGE: ABNORMAL
PLAT MORPH BLD: ABNORMAL
PLAT MORPH BLD: NORMAL
PLATELET # BLD AUTO: 104 10E3/UL (ref 150–450)
PLATELET # BLD AUTO: 110 10E3/UL (ref 150–450)
PLATELET # BLD AUTO: 148 10E3/UL (ref 150–450)
PLATELET # BLD AUTO: 156 10E3/UL (ref 150–450)
PLATELET # BLD AUTO: 188 10E3/UL (ref 150–450)
PLATELET # BLD AUTO: 194 10E3/UL (ref 150–450)
PLATELET # BLD AUTO: 223 10E3/UL (ref 150–450)
PLATELET # BLD AUTO: 236 10E3/UL (ref 150–450)
PLATELET # BLD AUTO: 248 10E3/UL (ref 150–450)
PLATELET # BLD AUTO: 252 10E3/UL (ref 150–450)
PLATELET # BLD AUTO: 254 10E3/UL (ref 150–450)
PLATELET # BLD AUTO: 273 10E3/UL (ref 150–450)
PO2 BLD: 81 MM HG (ref 80–105)
PO2 BLD: 94 MM HG (ref 80–105)
PO2 BLDV: 60 MM HG (ref 25–47)
POLYCHROMASIA BLD QL SMEAR: NORMAL
POTASSIUM (EXTERNAL): 3.9 MMOL/L (ref 3.5–5.1)
POTASSIUM (EXTERNAL): 3.9 MMOL/L (ref 3.5–5.1)
POTASSIUM SERPL-SCNC: 3.6 MMOL/L (ref 3.4–5.3)
POTASSIUM SERPL-SCNC: 3.7 MMOL/L (ref 3.4–5.3)
POTASSIUM SERPL-SCNC: 3.9 MMOL/L (ref 3.4–5.3)
POTASSIUM SERPL-SCNC: 4 MMOL/L (ref 3.4–5.3)
POTASSIUM SERPL-SCNC: 4.2 MMOL/L (ref 3.4–5.3)
POTASSIUM SERPL-SCNC: 4.2 MMOL/L (ref 3.4–5.3)
POTASSIUM SERPL-SCNC: 4.3 MMOL/L (ref 3.4–5.3)
POTASSIUM SERPL-SCNC: 4.4 MMOL/L (ref 3.4–5.3)
POTASSIUM SERPL-SCNC: 4.5 MMOL/L (ref 3.4–5.3)
POTASSIUM SERPL-SCNC: 4.8 MMOL/L (ref 3.4–5.3)
POTASSIUM SERPL-SCNC: 5.1 MMOL/L (ref 3.4–5.3)
POTASSIUM SERPL-SCNC: 5.3 MMOL/L (ref 3.4–5.3)
PR INTERVAL - MUSE: NORMAL MS
PROT SERPL-MCNC: 6.6 G/DL (ref 6.4–8.3)
PROT SERPL-MCNC: 6.7 G/DL (ref 6.4–8.3)
PROT SERPL-MCNC: 6.9 G/DL (ref 6.4–8.3)
PROT SERPL-MCNC: 7.1 G/DL (ref 6.4–8.3)
PROT SERPL-MCNC: 7.4 G/DL (ref 6.4–8.3)
PROT SERPL-MCNC: 7.6 G/DL (ref 6.4–8.3)
PROT SERPL-MCNC: 7.7 G/DL (ref 6.4–8.3)
PROT SERPL-MCNC: 8.1 G/DL (ref 6.4–8.3)
QRS DURATION - MUSE: 64 MS
QRS DURATION - MUSE: 66 MS
QRS DURATION - MUSE: 72 MS
QRS DURATION - MUSE: 76 MS
QRS DURATION - MUSE: 76 MS
QT - MUSE: 288 MS
QT - MUSE: 340 MS
QT - MUSE: 340 MS
QT - MUSE: 372 MS
QT - MUSE: 400 MS
QTC - MUSE: 374 MS
QTC - MUSE: 382 MS
QTC - MUSE: 409 MS
QTC - MUSE: 415 MS
QTC - MUSE: 444 MS
R AXIS - MUSE: 15 DEGREES
R AXIS - MUSE: 16 DEGREES
R AXIS - MUSE: 21 DEGREES
R AXIS - MUSE: 24 DEGREES
R AXIS - MUSE: 8 DEGREES
RBC # BLD AUTO: 2.96 10E6/UL (ref 3.8–5.2)
RBC # BLD AUTO: 3.07 10E6/UL (ref 3.8–5.2)
RBC # BLD AUTO: 3.15 10E6/UL (ref 3.8–5.2)
RBC # BLD AUTO: 3.17 10E6/UL (ref 3.8–5.2)
RBC # BLD AUTO: 3.52 10E6/UL (ref 3.8–5.2)
RBC # BLD AUTO: 3.68 10E6/UL (ref 3.8–5.2)
RBC # BLD AUTO: 3.73 10E6/UL (ref 3.8–5.2)
RBC # BLD AUTO: 3.81 10E6/UL (ref 3.8–5.2)
RBC # BLD AUTO: 3.85 10E6/UL (ref 3.8–5.2)
RBC # BLD AUTO: 3.98 10E6/UL (ref 3.8–5.2)
RBC # BLD AUTO: 4 10E6/UL (ref 3.8–5.2)
RBC # BLD AUTO: 4.2 10E6/UL (ref 3.8–5.2)
RBC AGGLUT BLD QL: NORMAL
RBC MORPH BLD: ABNORMAL
RBC MORPH BLD: NORMAL
RBC URINE: 3 /HPF
RBC URINE: <1 /HPF
ROULEAUX BLD QL SMEAR: NORMAL
RSV RNA SPEC NAA+PROBE: NEGATIVE
RSV RNA SPEC NAA+PROBE: NEGATIVE
SAO2 % BLDA: 91 % (ref 92–100)
SAO2 % BLDA: 96 % (ref 92–100)
SAO2 % BLDV: 92.1 % (ref 70–75)
SARS-COV-2 RNA RESP QL NAA+PROBE: NEGATIVE
SARS-COV-2 RNA RESP QL NAA+PROBE: NEGATIVE
SICKLE CELLS BLD QL SMEAR: NORMAL
SMUDGE CELLS BLD QL SMEAR: NORMAL
SODIUM SERPL-SCNC: 129 MMOL/L (ref 135–145)
SODIUM SERPL-SCNC: 132 MMOL/L (ref 135–145)
SODIUM SERPL-SCNC: 132 MMOL/L (ref 135–145)
SODIUM SERPL-SCNC: 133 MMOL/L (ref 135–145)
SODIUM SERPL-SCNC: 135 MMOL/L (ref 135–145)
SODIUM SERPL-SCNC: 135 MMOL/L (ref 135–145)
SODIUM SERPL-SCNC: 136 MMOL/L (ref 135–145)
SODIUM SERPL-SCNC: 136 MMOL/L (ref 135–145)
SP GR UR STRIP: 1.01 (ref 1–1.03)
SP GR UR STRIP: 1.02 (ref 1–1.03)
SPHEROCYTES BLD QL SMEAR: NORMAL
SQUAMOUS EPITHELIAL: 0 /HPF
SQUAMOUS EPITHELIAL: 5 /HPF
STOMATOCYTES BLD QL SMEAR: NORMAL
SYSTOLIC BLOOD PRESSURE - MUSE: NORMAL MMHG
T AXIS - MUSE: 139 DEGREES
T AXIS - MUSE: 163 DEGREES
T AXIS - MUSE: 190 DEGREES
T AXIS - MUSE: 190 DEGREES
T AXIS - MUSE: 53 DEGREES
TARGETS BLD QL SMEAR: NORMAL
TOXIC GRANULES BLD QL SMEAR: NORMAL
TRIGL SERPL-MCNC: 88 MG/DL
TROPONIN T SERPL HS-MCNC: 19 NG/L
TROPONIN T SERPL HS-MCNC: 20 NG/L
TROPONIN T SERPL HS-MCNC: 22 NG/L
TSH SERPL DL<=0.005 MIU/L-ACNC: 0.62 UIU/ML (ref 0.3–4.2)
TSH SERPL DL<=0.005 MIU/L-ACNC: 0.64 UIU/ML (ref 0.3–4.2)
UROBILINOGEN UR STRIP-MCNC: NORMAL MG/DL
UROBILINOGEN UR STRIP-MCNC: NORMAL MG/DL
VARIANT LYMPHS BLD QL SMEAR: NORMAL
VENTRICULAR RATE- MUSE: 106 BPM
VENTRICULAR RATE- MUSE: 73 BPM
VENTRICULAR RATE- MUSE: 73 BPM
VENTRICULAR RATE- MUSE: 74 BPM
VENTRICULAR RATE- MUSE: 90 BPM
WBC # BLD AUTO: 10 10E3/UL (ref 4–11)
WBC # BLD AUTO: 5.3 10E3/UL (ref 4–11)
WBC # BLD AUTO: 6.6 10E3/UL (ref 4–11)
WBC # BLD AUTO: 6.8 10E3/UL (ref 4–11)
WBC # BLD AUTO: 7.2 10E3/UL (ref 4–11)
WBC # BLD AUTO: 7.2 10E3/UL (ref 4–11)
WBC # BLD AUTO: 7.7 10E3/UL (ref 4–11)
WBC # BLD AUTO: 8.5 10E3/UL (ref 4–11)
WBC # BLD AUTO: 8.7 10E3/UL (ref 4–11)
WBC # BLD AUTO: 8.8 10E3/UL (ref 4–11)
WBC URINE: 9 /HPF
WBC URINE: <1 /HPF

## 2024-01-01 PROCEDURE — G0463 HOSPITAL OUTPT CLINIC VISIT: HCPCS | Performed by: INTERNAL MEDICINE

## 2024-01-01 PROCEDURE — 250N000013 HC RX MED GY IP 250 OP 250 PS 637: Performed by: HOSPITALIST

## 2024-01-01 PROCEDURE — 36415 COLL VENOUS BLD VENIPUNCTURE: CPT | Performed by: INTERNAL MEDICINE

## 2024-01-01 PROCEDURE — G0463 HOSPITAL OUTPT CLINIC VISIT: HCPCS

## 2024-01-01 PROCEDURE — 83735 ASSAY OF MAGNESIUM: CPT | Mod: ZL | Performed by: INTERNAL MEDICINE

## 2024-01-01 PROCEDURE — 84484 ASSAY OF TROPONIN QUANT: CPT | Performed by: PHYSICIAN ASSISTANT

## 2024-01-01 PROCEDURE — 85610 PROTHROMBIN TIME: CPT | Performed by: INTERNAL MEDICINE

## 2024-01-01 PROCEDURE — 999N000157 HC STATISTIC RCP TIME EA 10 MIN

## 2024-01-01 PROCEDURE — 85027 COMPLETE CBC AUTOMATED: CPT | Mod: ZL | Performed by: INTERNAL MEDICINE

## 2024-01-01 PROCEDURE — G1010 CDSM STANSON: HCPCS

## 2024-01-01 PROCEDURE — 80162 ASSAY OF DIGOXIN TOTAL: CPT | Mod: ZL | Performed by: INTERNAL MEDICINE

## 2024-01-01 PROCEDURE — 99417 PROLNG OP E/M EACH 15 MIN: CPT | Performed by: INTERNAL MEDICINE

## 2024-01-01 PROCEDURE — 250N000009 HC RX 250: Performed by: HOSPITALIST

## 2024-01-01 PROCEDURE — 94640 AIRWAY INHALATION TREATMENT: CPT | Mod: 76

## 2024-01-01 PROCEDURE — 80048 BASIC METABOLIC PNL TOTAL CA: CPT | Performed by: HOSPITALIST

## 2024-01-01 PROCEDURE — 82805 BLOOD GASES W/O2 SATURATION: CPT | Performed by: HOSPITALIST

## 2024-01-01 PROCEDURE — 71045 X-RAY EXAM CHEST 1 VIEW: CPT

## 2024-01-01 PROCEDURE — 250N000009 HC RX 250: Performed by: STUDENT IN AN ORGANIZED HEALTH CARE EDUCATION/TRAINING PROGRAM

## 2024-01-01 PROCEDURE — 255N000002 HC RX 255 OP 636: Performed by: RADIOLOGY

## 2024-01-01 PROCEDURE — 94660 CPAP INITIATION&MGMT: CPT

## 2024-01-01 PROCEDURE — 99215 OFFICE O/P EST HI 40 MIN: CPT | Performed by: NURSE PRACTITIONER

## 2024-01-01 PROCEDURE — 250N000011 HC RX IP 250 OP 636: Performed by: HOSPITALIST

## 2024-01-01 PROCEDURE — 99232 SBSQ HOSP IP/OBS MODERATE 35: CPT | Performed by: HOSPITALIST

## 2024-01-01 PROCEDURE — 84484 ASSAY OF TROPONIN QUANT: CPT | Performed by: STUDENT IN AN ORGANIZED HEALTH CARE EDUCATION/TRAINING PROGRAM

## 2024-01-01 PROCEDURE — 36415 COLL VENOUS BLD VENIPUNCTURE: CPT | Performed by: NURSE ANESTHETIST, CERTIFIED REGISTERED

## 2024-01-01 PROCEDURE — 96375 TX/PRO/DX INJ NEW DRUG ADDON: CPT

## 2024-01-01 PROCEDURE — 83880 ASSAY OF NATRIURETIC PEPTIDE: CPT | Performed by: INTERNAL MEDICINE

## 2024-01-01 PROCEDURE — 36415 COLL VENOUS BLD VENIPUNCTURE: CPT | Performed by: HOSPITALIST

## 2024-01-01 PROCEDURE — 81001 URINALYSIS AUTO W/SCOPE: CPT | Performed by: PHYSICIAN ASSISTANT

## 2024-01-01 PROCEDURE — 99215 OFFICE O/P EST HI 40 MIN: CPT | Performed by: INTERNAL MEDICINE

## 2024-01-01 PROCEDURE — 93005 ELECTROCARDIOGRAM TRACING: CPT

## 2024-01-01 PROCEDURE — 85610 PROTHROMBIN TIME: CPT | Performed by: HOSPITALIST

## 2024-01-01 PROCEDURE — 250N000011 HC RX IP 250 OP 636: Performed by: RADIOLOGY

## 2024-01-01 PROCEDURE — 200N000001 HC R&B ICU

## 2024-01-01 PROCEDURE — 99233 SBSQ HOSP IP/OBS HIGH 50: CPT | Performed by: INTERNAL MEDICINE

## 2024-01-01 PROCEDURE — 36415 COLL VENOUS BLD VENIPUNCTURE: CPT | Performed by: STUDENT IN AN ORGANIZED HEALTH CARE EDUCATION/TRAINING PROGRAM

## 2024-01-01 PROCEDURE — 80048 BASIC METABOLIC PNL TOTAL CA: CPT | Performed by: STUDENT IN AN ORGANIZED HEALTH CARE EDUCATION/TRAINING PROGRAM

## 2024-01-01 PROCEDURE — 80162 ASSAY OF DIGOXIN TOTAL: CPT | Performed by: HOSPITALIST

## 2024-01-01 PROCEDURE — 85027 COMPLETE CBC AUTOMATED: CPT | Performed by: INTERNAL MEDICINE

## 2024-01-01 PROCEDURE — 80053 COMPREHEN METABOLIC PANEL: CPT | Performed by: HOSPITALIST

## 2024-01-01 PROCEDURE — 250N000011 HC RX IP 250 OP 636: Performed by: INTERNAL MEDICINE

## 2024-01-01 PROCEDURE — 36415 COLL VENOUS BLD VENIPUNCTURE: CPT | Performed by: PHYSICIAN ASSISTANT

## 2024-01-01 PROCEDURE — 82374 ASSAY BLOOD CARBON DIOXIDE: CPT | Performed by: PHYSICIAN ASSISTANT

## 2024-01-01 PROCEDURE — 120N000001 HC R&B MED SURG/OB

## 2024-01-01 PROCEDURE — 85025 COMPLETE CBC W/AUTO DIFF WBC: CPT | Mod: ZL | Performed by: INTERNAL MEDICINE

## 2024-01-01 PROCEDURE — 83735 ASSAY OF MAGNESIUM: CPT | Performed by: STUDENT IN AN ORGANIZED HEALTH CARE EDUCATION/TRAINING PROGRAM

## 2024-01-01 PROCEDURE — 74176 CT ABD & PELVIS W/O CONTRAST: CPT | Mod: MG

## 2024-01-01 PROCEDURE — 88305 TISSUE EXAM BY PATHOLOGIST: CPT | Mod: 26 | Performed by: PATHOLOGY

## 2024-01-01 PROCEDURE — 83880 ASSAY OF NATRIURETIC PEPTIDE: CPT | Performed by: STUDENT IN AN ORGANIZED HEALTH CARE EDUCATION/TRAINING PROGRAM

## 2024-01-01 PROCEDURE — 36600 WITHDRAWAL OF ARTERIAL BLOOD: CPT

## 2024-01-01 PROCEDURE — 94640 AIRWAY INHALATION TREATMENT: CPT

## 2024-01-01 PROCEDURE — 99239 HOSP IP/OBS DSCHRG MGMT >30: CPT | Performed by: INTERNAL MEDICINE

## 2024-01-01 PROCEDURE — 83735 ASSAY OF MAGNESIUM: CPT | Performed by: HOSPITALIST

## 2024-01-01 PROCEDURE — 83880 ASSAY OF NATRIURETIC PEPTIDE: CPT | Mod: ZL | Performed by: INTERNAL MEDICINE

## 2024-01-01 PROCEDURE — 84100 ASSAY OF PHOSPHORUS: CPT | Performed by: HOSPITALIST

## 2024-01-01 PROCEDURE — 70553 MRI BRAIN STEM W/O & W/DYE: CPT | Mod: MG

## 2024-01-01 PROCEDURE — 99285 EMERGENCY DEPT VISIT HI MDM: CPT | Mod: 25

## 2024-01-01 PROCEDURE — 85049 AUTOMATED PLATELET COUNT: CPT | Performed by: STUDENT IN AN ORGANIZED HEALTH CARE EDUCATION/TRAINING PROGRAM

## 2024-01-01 PROCEDURE — 80053 COMPREHEN METABOLIC PANEL: CPT | Performed by: INTERNAL MEDICINE

## 2024-01-01 PROCEDURE — 343N000001 HC RX 343: Performed by: INTERNAL MEDICINE

## 2024-01-01 PROCEDURE — 85025 COMPLETE CBC W/AUTO DIFF WBC: CPT | Performed by: HOSPITALIST

## 2024-01-01 PROCEDURE — 99223 1ST HOSP IP/OBS HIGH 75: CPT | Mod: AI | Performed by: HOSPITALIST

## 2024-01-01 PROCEDURE — 85041 AUTOMATED RBC COUNT: CPT | Performed by: STUDENT IN AN ORGANIZED HEALTH CARE EDUCATION/TRAINING PROGRAM

## 2024-01-01 PROCEDURE — 83880 ASSAY OF NATRIURETIC PEPTIDE: CPT | Performed by: PHYSICIAN ASSISTANT

## 2024-01-01 PROCEDURE — 85379 FIBRIN DEGRADATION QUANT: CPT | Performed by: STUDENT IN AN ORGANIZED HEALTH CARE EDUCATION/TRAINING PROGRAM

## 2024-01-01 PROCEDURE — 84443 ASSAY THYROID STIM HORMONE: CPT | Mod: ZL | Performed by: INTERNAL MEDICINE

## 2024-01-01 PROCEDURE — 250N000011 HC RX IP 250 OP 636: Mod: JZ | Performed by: INTERNAL MEDICINE

## 2024-01-01 PROCEDURE — 78815 PET IMAGE W/CT SKULL-THIGH: CPT | Mod: PI,MG

## 2024-01-01 PROCEDURE — 85610 PROTHROMBIN TIME: CPT | Performed by: STUDENT IN AN ORGANIZED HEALTH CARE EDUCATION/TRAINING PROGRAM

## 2024-01-01 PROCEDURE — 71260 CT THORAX DX C+: CPT | Mod: MF

## 2024-01-01 PROCEDURE — 80053 COMPREHEN METABOLIC PANEL: CPT | Performed by: STUDENT IN AN ORGANIZED HEALTH CARE EDUCATION/TRAINING PROGRAM

## 2024-01-01 PROCEDURE — 71046 X-RAY EXAM CHEST 2 VIEWS: CPT

## 2024-01-01 PROCEDURE — 82805 BLOOD GASES W/O2 SATURATION: CPT | Performed by: STUDENT IN AN ORGANIZED HEALTH CARE EDUCATION/TRAINING PROGRAM

## 2024-01-01 PROCEDURE — 258N000003 HC RX IP 258 OP 636: Performed by: STUDENT IN AN ORGANIZED HEALTH CARE EDUCATION/TRAINING PROGRAM

## 2024-01-01 PROCEDURE — 250N000013 HC RX MED GY IP 250 OP 250 PS 637: Performed by: INTERNAL MEDICINE

## 2024-01-01 PROCEDURE — 76536 US EXAM OF HEAD AND NECK: CPT

## 2024-01-01 PROCEDURE — 96368 THER/DIAG CONCURRENT INF: CPT

## 2024-01-01 PROCEDURE — 99285 EMERGENCY DEPT VISIT HI MDM: CPT | Performed by: STUDENT IN AN ORGANIZED HEALTH CARE EDUCATION/TRAINING PROGRAM

## 2024-01-01 PROCEDURE — 96413 CHEMO IV INFUSION 1 HR: CPT

## 2024-01-01 PROCEDURE — 96415 CHEMO IV INFUSION ADDL HR: CPT

## 2024-01-01 PROCEDURE — 80053 COMPREHEN METABOLIC PANEL: CPT | Mod: ZL

## 2024-01-01 PROCEDURE — 93306 TTE W/DOPPLER COMPLETE: CPT

## 2024-01-01 PROCEDURE — 250N000009 HC RX 250: Performed by: PHYSICIAN ASSISTANT

## 2024-01-01 PROCEDURE — 99232 SBSQ HOSP IP/OBS MODERATE 35: CPT | Performed by: INTERNAL MEDICINE

## 2024-01-01 PROCEDURE — 80053 COMPREHEN METABOLIC PANEL: CPT | Mod: ZL | Performed by: INTERNAL MEDICINE

## 2024-01-01 PROCEDURE — 258N000003 HC RX IP 258 OP 636: Mod: JZ | Performed by: STUDENT IN AN ORGANIZED HEALTH CARE EDUCATION/TRAINING PROGRAM

## 2024-01-01 PROCEDURE — 93306 TTE W/DOPPLER COMPLETE: CPT | Mod: 26 | Performed by: INTERNAL MEDICINE

## 2024-01-01 PROCEDURE — 36415 COLL VENOUS BLD VENIPUNCTURE: CPT

## 2024-01-01 PROCEDURE — 93010 ELECTROCARDIOGRAM REPORT: CPT | Performed by: INTERNAL MEDICINE

## 2024-01-01 PROCEDURE — 51798 US URINE CAPACITY MEASURE: CPT

## 2024-01-01 PROCEDURE — A9552 F18 FDG: HCPCS | Performed by: INTERNAL MEDICINE

## 2024-01-01 PROCEDURE — 93005 ELECTROCARDIOGRAM TRACING: CPT | Performed by: INTERNAL MEDICINE

## 2024-01-01 PROCEDURE — 82565 ASSAY OF CREATININE: CPT | Performed by: PHYSICIAN ASSISTANT

## 2024-01-01 PROCEDURE — 258N000003 HC RX IP 258 OP 636: Performed by: INTERNAL MEDICINE

## 2024-01-01 PROCEDURE — 99213 OFFICE O/P EST LOW 20 MIN: CPT | Performed by: INTERNAL MEDICINE

## 2024-01-01 PROCEDURE — 82040 ASSAY OF SERUM ALBUMIN: CPT | Mod: ZL | Performed by: INTERNAL MEDICINE

## 2024-01-01 PROCEDURE — 250N000009 HC RX 250: Performed by: RADIOLOGY

## 2024-01-01 PROCEDURE — 87637 SARSCOV2&INF A&B&RSV AMP PRB: CPT | Performed by: INTERNAL MEDICINE

## 2024-01-01 PROCEDURE — 80162 ASSAY OF DIGOXIN TOTAL: CPT | Performed by: INTERNAL MEDICINE

## 2024-01-01 PROCEDURE — 96374 THER/PROPH/DIAG INJ IV PUSH: CPT

## 2024-01-01 PROCEDURE — 258N000003 HC RX IP 258 OP 636: Mod: JZ | Performed by: HOSPITALIST

## 2024-01-01 PROCEDURE — 85007 BL SMEAR W/DIFF WBC COUNT: CPT | Performed by: HOSPITALIST

## 2024-01-01 PROCEDURE — G0463 HOSPITAL OUTPT CLINIC VISIT: HCPCS | Mod: 25

## 2024-01-01 PROCEDURE — 85610 PROTHROMBIN TIME: CPT | Performed by: PHYSICIAN ASSISTANT

## 2024-01-01 PROCEDURE — 250N000009 HC RX 250: Performed by: INTERNAL MEDICINE

## 2024-01-01 PROCEDURE — 85025 COMPLETE CBC W/AUTO DIFF WBC: CPT | Performed by: INTERNAL MEDICINE

## 2024-01-01 PROCEDURE — 36415 COLL VENOUS BLD VENIPUNCTURE: CPT | Mod: ZL | Performed by: INTERNAL MEDICINE

## 2024-01-01 PROCEDURE — 74177 CT ABD & PELVIS W/CONTRAST: CPT | Mod: MG

## 2024-01-01 PROCEDURE — 96360 HYDRATION IV INFUSION INIT: CPT

## 2024-01-01 PROCEDURE — 80061 LIPID PANEL: CPT | Mod: ZL | Performed by: INTERNAL MEDICINE

## 2024-01-01 PROCEDURE — 250N000011 HC RX IP 250 OP 636: Performed by: STUDENT IN AN ORGANIZED HEALTH CARE EDUCATION/TRAINING PROGRAM

## 2024-01-01 PROCEDURE — 99285 EMERGENCY DEPT VISIT HI MDM: CPT | Performed by: INTERNAL MEDICINE

## 2024-01-01 PROCEDURE — 85025 COMPLETE CBC W/AUTO DIFF WBC: CPT | Performed by: PHYSICIAN ASSISTANT

## 2024-01-01 PROCEDURE — 272N000414 CT BONE BIOPSY SUPERFICIAL

## 2024-01-01 PROCEDURE — 85610 PROTHROMBIN TIME: CPT | Performed by: NURSE ANESTHETIST, CERTIFIED REGISTERED

## 2024-01-01 PROCEDURE — 88342 IMHCHEM/IMCYTCHM 1ST ANTB: CPT | Mod: 26 | Performed by: PATHOLOGY

## 2024-01-01 PROCEDURE — 87637 SARSCOV2&INF A&B&RSV AMP PRB: CPT | Performed by: STUDENT IN AN ORGANIZED HEALTH CARE EDUCATION/TRAINING PROGRAM

## 2024-01-01 PROCEDURE — 85004 AUTOMATED DIFF WBC COUNT: CPT | Performed by: HOSPITALIST

## 2024-01-01 PROCEDURE — 99284 EMERGENCY DEPT VISIT MOD MDM: CPT | Performed by: PHYSICIAN ASSISTANT

## 2024-01-01 PROCEDURE — 99417 PROLNG OP E/M EACH 15 MIN: CPT | Performed by: NURSE PRACTITIONER

## 2024-01-01 PROCEDURE — 250N000011 HC RX IP 250 OP 636: Mod: JZ | Performed by: HOSPITALIST

## 2024-01-01 PROCEDURE — 77012 CT SCAN FOR NEEDLE BIOPSY: CPT

## 2024-01-01 PROCEDURE — 85027 COMPLETE CBC AUTOMATED: CPT | Performed by: HOSPITALIST

## 2024-01-01 PROCEDURE — 88341 IMHCHEM/IMCYTCHM EA ADD ANTB: CPT | Mod: 26 | Performed by: PATHOLOGY

## 2024-01-01 PROCEDURE — A9585 GADOBUTROL INJECTION: HCPCS | Performed by: RADIOLOGY

## 2024-01-01 PROCEDURE — 96367 TX/PROPH/DG ADDL SEQ IV INF: CPT

## 2024-01-01 PROCEDURE — 81001 URINALYSIS AUTO W/SCOPE: CPT | Performed by: STUDENT IN AN ORGANIZED HEALTH CARE EDUCATION/TRAINING PROGRAM

## 2024-01-01 PROCEDURE — 96365 THER/PROPH/DIAG IV INF INIT: CPT | Mod: XU

## 2024-01-01 PROCEDURE — 83605 ASSAY OF LACTIC ACID: CPT | Mod: 91 | Performed by: INTERNAL MEDICINE

## 2024-01-01 PROCEDURE — 86140 C-REACTIVE PROTEIN: CPT | Performed by: INTERNAL MEDICINE

## 2024-01-01 PROCEDURE — 99205 OFFICE O/P NEW HI 60 MIN: CPT | Performed by: INTERNAL MEDICINE

## 2024-01-01 PROCEDURE — 84443 ASSAY THYROID STIM HORMONE: CPT | Performed by: STUDENT IN AN ORGANIZED HEALTH CARE EDUCATION/TRAINING PROGRAM

## 2024-01-01 PROCEDURE — 83036 HEMOGLOBIN GLYCOSYLATED A1C: CPT | Mod: ZL | Performed by: INTERNAL MEDICINE

## 2024-01-01 PROCEDURE — 96375 TX/PRO/DX INJ NEW DRUG ADDON: CPT | Mod: XU

## 2024-01-01 PROCEDURE — 83735 ASSAY OF MAGNESIUM: CPT | Performed by: INTERNAL MEDICINE

## 2024-01-01 PROCEDURE — 87086 URINE CULTURE/COLONY COUNT: CPT | Performed by: STUDENT IN AN ORGANIZED HEALTH CARE EDUCATION/TRAINING PROGRAM

## 2024-01-01 PROCEDURE — 88342 IMHCHEM/IMCYTCHM 1ST ANTB: CPT | Mod: TC | Performed by: INTERNAL MEDICINE

## 2024-01-01 PROCEDURE — 99203 OFFICE O/P NEW LOW 30 MIN: CPT | Performed by: OBSTETRICS & GYNECOLOGY

## 2024-01-01 RX ORDER — GADOBUTROL 604.72 MG/ML
7.5 INJECTION INTRAVENOUS ONCE
Status: COMPLETED | OUTPATIENT
Start: 2024-01-01 | End: 2024-01-01

## 2024-01-01 RX ORDER — DILTIAZEM HYDROCHLORIDE 120 MG/1
120 CAPSULE, EXTENDED RELEASE ORAL EVERY 12 HOURS
Qty: 180 CAPSULE | Refills: 3 | Status: SHIPPED | OUTPATIENT
Start: 2024-01-01 | End: 2024-01-01

## 2024-01-01 RX ORDER — NALOXONE HYDROCHLORIDE 0.4 MG/ML
0.4 INJECTION, SOLUTION INTRAMUSCULAR; INTRAVENOUS; SUBCUTANEOUS
Status: DISCONTINUED | OUTPATIENT
Start: 2024-01-01 | End: 2024-01-01 | Stop reason: HOSPADM

## 2024-01-01 RX ORDER — IPRATROPIUM BROMIDE AND ALBUTEROL SULFATE 2.5; .5 MG/3ML; MG/3ML
3 SOLUTION RESPIRATORY (INHALATION)
Status: DISCONTINUED | OUTPATIENT
Start: 2024-01-01 | End: 2024-01-01

## 2024-01-01 RX ORDER — HEPARIN SODIUM (PORCINE) LOCK FLUSH IV SOLN 100 UNIT/ML 100 UNIT/ML
5 SOLUTION INTRAVENOUS
Status: CANCELLED | OUTPATIENT
Start: 2024-01-01

## 2024-01-01 RX ORDER — FLUDEOXYGLUCOSE F 18 200 MCI/ML
12.01 INJECTION, SOLUTION INTRAVENOUS ONCE
Status: COMPLETED | OUTPATIENT
Start: 2024-01-01 | End: 2024-01-01

## 2024-01-01 RX ORDER — EPINEPHRINE 1 MG/ML
0.3 INJECTION, SOLUTION, CONCENTRATE INTRAVENOUS EVERY 5 MIN PRN
Status: CANCELLED | OUTPATIENT
Start: 2024-01-01

## 2024-01-01 RX ORDER — FENTANYL CITRATE 50 UG/ML
50 INJECTION, SOLUTION INTRAMUSCULAR; INTRAVENOUS EVERY 5 MIN PRN
Status: CANCELLED | OUTPATIENT
Start: 2024-01-01

## 2024-01-01 RX ORDER — IPRATROPIUM BROMIDE AND ALBUTEROL SULFATE 2.5; .5 MG/3ML; MG/3ML
3 SOLUTION RESPIRATORY (INHALATION)
Status: DISCONTINUED | OUTPATIENT
Start: 2024-01-01 | End: 2024-01-01 | Stop reason: HOSPADM

## 2024-01-01 RX ORDER — IOPAMIDOL 755 MG/ML
71 INJECTION, SOLUTION INTRAVASCULAR ONCE
Status: COMPLETED | OUTPATIENT
Start: 2024-01-01 | End: 2024-01-01

## 2024-01-01 RX ORDER — BISACODYL 10 MG
10 SUPPOSITORY, RECTAL RECTAL
Status: DISCONTINUED | OUTPATIENT
Start: 2024-06-29 | End: 2024-01-01

## 2024-01-01 RX ORDER — DILTIAZEM HYDROCHLORIDE 180 MG/1
360 CAPSULE, COATED, EXTENDED RELEASE ORAL DAILY
Status: DISCONTINUED | OUTPATIENT
Start: 2024-01-01 | End: 2024-01-01

## 2024-01-01 RX ORDER — LEVALBUTEROL INHALATION SOLUTION 0.63 MG/3ML
0.63 SOLUTION RESPIRATORY (INHALATION)
Status: DISCONTINUED | OUTPATIENT
Start: 2024-01-01 | End: 2024-01-01

## 2024-01-01 RX ORDER — DEXTROSE MONOHYDRATE 25 G/50ML
25-50 INJECTION, SOLUTION INTRAVENOUS
Status: DISCONTINUED | OUTPATIENT
Start: 2024-01-01 | End: 2024-01-01 | Stop reason: HOSPADM

## 2024-01-01 RX ORDER — FENTANYL CITRATE 50 UG/ML
25 INJECTION, SOLUTION INTRAMUSCULAR; INTRAVENOUS EVERY 5 MIN PRN
Status: CANCELLED | OUTPATIENT
Start: 2024-01-01

## 2024-01-01 RX ORDER — SODIUM CHLORIDE 9 MG/ML
INJECTION, SOLUTION INTRAVENOUS CONTINUOUS
Status: ACTIVE | OUTPATIENT
Start: 2024-01-01 | End: 2024-01-01

## 2024-01-01 RX ORDER — MORPHINE SULFATE 2 MG/ML
2 INJECTION, SOLUTION INTRAMUSCULAR; INTRAVENOUS
Status: DISCONTINUED | OUTPATIENT
Start: 2024-01-01 | End: 2024-01-01 | Stop reason: DRUGHIGH

## 2024-01-01 RX ORDER — ALBUTEROL SULFATE 90 UG/1
2 AEROSOL, METERED RESPIRATORY (INHALATION) EVERY 4 HOURS PRN
Status: DISCONTINUED | OUTPATIENT
Start: 2024-01-01 | End: 2024-01-01

## 2024-01-01 RX ORDER — NALOXONE HYDROCHLORIDE 0.4 MG/ML
0.2 INJECTION, SOLUTION INTRAMUSCULAR; INTRAVENOUS; SUBCUTANEOUS
Status: DISCONTINUED | OUTPATIENT
Start: 2024-01-01 | End: 2024-01-01

## 2024-01-01 RX ORDER — ALBUTEROL SULFATE 90 UG/1
2 AEROSOL, METERED RESPIRATORY (INHALATION) EVERY 4 HOURS PRN
COMMUNITY

## 2024-01-01 RX ORDER — DIPHENHYDRAMINE HYDROCHLORIDE 50 MG/ML
50 INJECTION INTRAMUSCULAR; INTRAVENOUS
Status: CANCELLED
Start: 2024-01-01

## 2024-01-01 RX ORDER — CALCIUM CARBONATE 500 MG/1
1000 TABLET, CHEWABLE ORAL 4 TIMES DAILY PRN
Status: DISCONTINUED | OUTPATIENT
Start: 2024-01-01 | End: 2024-01-01

## 2024-01-01 RX ORDER — IPRATROPIUM BROMIDE AND ALBUTEROL SULFATE 2.5; .5 MG/3ML; MG/3ML
3 SOLUTION RESPIRATORY (INHALATION) ONCE
Status: COMPLETED | OUTPATIENT
Start: 2024-01-01 | End: 2024-01-01

## 2024-01-01 RX ORDER — NYSTATIN 100000 U/G
CREAM TOPICAL DAILY PRN
Status: DISCONTINUED | OUTPATIENT
Start: 2024-01-01 | End: 2024-01-01 | Stop reason: HOSPADM

## 2024-01-01 RX ORDER — OXYCODONE HYDROCHLORIDE 5 MG/1
5 TABLET ORAL EVERY 4 HOURS PRN
Status: DISCONTINUED | OUTPATIENT
Start: 2024-01-01 | End: 2024-01-01

## 2024-01-01 RX ORDER — ONDANSETRON 2 MG/ML
4 INJECTION INTRAMUSCULAR; INTRAVENOUS EVERY 6 HOURS PRN
Status: DISCONTINUED | OUTPATIENT
Start: 2024-01-01 | End: 2024-01-01 | Stop reason: HOSPADM

## 2024-01-01 RX ORDER — HEPARIN SODIUM,PORCINE 10 UNIT/ML
5-20 VIAL (ML) INTRAVENOUS DAILY PRN
Status: CANCELLED | OUTPATIENT
Start: 2024-01-01

## 2024-01-01 RX ORDER — IOPAMIDOL 755 MG/ML
84 INJECTION, SOLUTION INTRAVASCULAR ONCE
Status: COMPLETED | OUTPATIENT
Start: 2024-01-01 | End: 2024-01-01

## 2024-01-01 RX ORDER — LIDOCAINE HYDROCHLORIDE 10 MG/ML
INJECTION, SOLUTION EPIDURAL; INFILTRATION; INTRACAUDAL; PERINEURAL
Status: DISCONTINUED
Start: 2024-01-01 | End: 2024-01-01 | Stop reason: HOSPADM

## 2024-01-01 RX ORDER — METHYLPREDNISOLONE SODIUM SUCCINATE 125 MG/2ML
125 INJECTION, POWDER, LYOPHILIZED, FOR SOLUTION INTRAMUSCULAR; INTRAVENOUS ONCE
Status: COMPLETED | OUTPATIENT
Start: 2024-01-01 | End: 2024-01-01

## 2024-01-01 RX ORDER — MORPHINE SULFATE 2 MG/ML
1 INJECTION, SOLUTION INTRAMUSCULAR; INTRAVENOUS
Status: DISCONTINUED | OUTPATIENT
Start: 2024-01-01 | End: 2024-01-01 | Stop reason: HOSPADM

## 2024-01-01 RX ORDER — METOPROLOL TARTRATE 100 MG
100 TABLET ORAL ONCE
Status: COMPLETED | OUTPATIENT
Start: 2024-01-01 | End: 2024-01-01

## 2024-01-01 RX ORDER — ALBUTEROL SULFATE 90 UG/1
1-2 AEROSOL, METERED RESPIRATORY (INHALATION)
Status: CANCELLED
Start: 2024-01-01

## 2024-01-01 RX ORDER — METOPROLOL TARTRATE 100 MG
200 TABLET ORAL 2 TIMES DAILY
Qty: 360 TABLET | Refills: 3 | Status: SHIPPED | OUTPATIENT
Start: 2024-01-01

## 2024-01-01 RX ORDER — LORAZEPAM 2 MG/ML
1 INJECTION INTRAMUSCULAR EVERY 4 HOURS PRN
Status: DISCONTINUED | OUTPATIENT
Start: 2024-01-01 | End: 2024-01-01 | Stop reason: HOSPADM

## 2024-01-01 RX ORDER — LORAZEPAM 0.5 MG/1
0.5 TABLET ORAL EVERY 4 HOURS PRN
Status: DISCONTINUED | OUTPATIENT
Start: 2024-01-01 | End: 2024-01-01

## 2024-01-01 RX ORDER — DIGOXIN 125 MCG
125 TABLET ORAL EVERY OTHER DAY
Qty: 45 TABLET | Refills: 3 | Status: SHIPPED | OUTPATIENT
Start: 2024-01-01

## 2024-01-01 RX ORDER — HYDROMORPHONE HYDROCHLORIDE 1 MG/ML
0.3 INJECTION, SOLUTION INTRAMUSCULAR; INTRAVENOUS; SUBCUTANEOUS
Status: DISCONTINUED | OUTPATIENT
Start: 2024-01-01 | End: 2024-01-01

## 2024-01-01 RX ORDER — BUPIVACAINE HYDROCHLORIDE 2.5 MG/ML
INJECTION, SOLUTION EPIDURAL; INFILTRATION; INTRACAUDAL
Status: DISCONTINUED
Start: 2024-01-01 | End: 2024-01-01 | Stop reason: HOSPADM

## 2024-01-01 RX ORDER — NALOXONE HYDROCHLORIDE 0.4 MG/ML
0.1 INJECTION, SOLUTION INTRAMUSCULAR; INTRAVENOUS; SUBCUTANEOUS
Status: CANCELLED | OUTPATIENT
Start: 2024-01-01

## 2024-01-01 RX ORDER — DILTIAZEM HYDROCHLORIDE 240 MG/1
240 CAPSULE, COATED, EXTENDED RELEASE ORAL ONCE
Status: COMPLETED | OUTPATIENT
Start: 2024-01-01 | End: 2024-01-01

## 2024-01-01 RX ORDER — LIDOCAINE 40 MG/G
CREAM TOPICAL
Status: DISCONTINUED | OUTPATIENT
Start: 2024-01-01 | End: 2024-01-01 | Stop reason: HOSPADM

## 2024-01-01 RX ORDER — DEXTROSE MONOHYDRATE 25 G/50ML
25-50 INJECTION, SOLUTION INTRAVENOUS
Status: CANCELLED | OUTPATIENT
Start: 2024-01-01

## 2024-01-01 RX ORDER — ONDANSETRON 2 MG/ML
4 INJECTION INTRAMUSCULAR; INTRAVENOUS EVERY 30 MIN PRN
Status: CANCELLED | OUTPATIENT
Start: 2024-01-01

## 2024-01-01 RX ORDER — SODIUM CHLORIDE 9 MG/ML
INJECTION, SOLUTION INTRAVENOUS CONTINUOUS PRN
Status: CANCELLED | OUTPATIENT
Start: 2024-01-01

## 2024-01-01 RX ORDER — MEPERIDINE HYDROCHLORIDE 25 MG/ML
25 INJECTION INTRAMUSCULAR; INTRAVENOUS; SUBCUTANEOUS EVERY 30 MIN PRN
Status: CANCELLED | OUTPATIENT
Start: 2024-01-01

## 2024-01-01 RX ORDER — AMOXICILLIN 250 MG
1 CAPSULE ORAL 2 TIMES DAILY
Status: DISCONTINUED | OUTPATIENT
Start: 2024-01-01 | End: 2024-01-01

## 2024-01-01 RX ORDER — MAGNESIUM OXIDE TAB 400 MG (241.3 MG ELEMENTAL MG) 400 (241.3 MG) MG
400 TAB ORAL DAILY
Qty: 90 TABLET | Refills: 1 | Status: SHIPPED | OUTPATIENT
Start: 2024-01-01 | End: 2024-01-01

## 2024-01-01 RX ORDER — LEVALBUTEROL INHALATION SOLUTION 0.63 MG/3ML
0.63 SOLUTION RESPIRATORY (INHALATION) EVERY 6 HOURS PRN
Status: DISCONTINUED | OUTPATIENT
Start: 2024-01-01 | End: 2024-01-01

## 2024-01-01 RX ORDER — CEFAZOLIN SODIUM/WATER 2 G/20 ML
2 SYRINGE (ML) INTRAVENOUS SEE ADMIN INSTRUCTIONS
Status: DISCONTINUED | OUTPATIENT
Start: 2024-01-01 | End: 2024-01-01 | Stop reason: HOSPADM

## 2024-01-01 RX ORDER — IPRATROPIUM BROMIDE AND ALBUTEROL SULFATE 2.5; .5 MG/3ML; MG/3ML
SOLUTION RESPIRATORY (INHALATION)
Status: COMPLETED
Start: 2024-01-01 | End: 2024-01-01

## 2024-01-01 RX ORDER — NICOTINE POLACRILEX 4 MG
15-30 LOZENGE BUCCAL
Status: DISCONTINUED | OUTPATIENT
Start: 2024-01-01 | End: 2024-01-01 | Stop reason: HOSPADM

## 2024-01-01 RX ORDER — PROCHLORPERAZINE MALEATE 10 MG
5 TABLET ORAL EVERY 6 HOURS PRN
Qty: 30 TABLET | Refills: 2 | Status: SHIPPED | OUTPATIENT
Start: 2024-01-01

## 2024-01-01 RX ORDER — NALOXONE HYDROCHLORIDE 0.4 MG/ML
0.4 INJECTION, SOLUTION INTRAMUSCULAR; INTRAVENOUS; SUBCUTANEOUS
Status: DISCONTINUED | OUTPATIENT
Start: 2024-01-01 | End: 2024-01-01

## 2024-01-01 RX ORDER — LORAZEPAM 2 MG/ML
0.5 INJECTION INTRAMUSCULAR ONCE
Status: COMPLETED | OUTPATIENT
Start: 2024-01-01 | End: 2024-01-01

## 2024-01-01 RX ORDER — LORAZEPAM 2 MG/ML
0.5 INJECTION INTRAMUSCULAR EVERY 4 HOURS PRN
Status: CANCELLED | OUTPATIENT
Start: 2024-01-01

## 2024-01-01 RX ORDER — FLUMAZENIL 0.1 MG/ML
0.2 INJECTION, SOLUTION INTRAVENOUS
Status: DISCONTINUED | OUTPATIENT
Start: 2024-01-01 | End: 2024-01-01 | Stop reason: HOSPADM

## 2024-01-01 RX ORDER — METOPROLOL TARTRATE 100 MG
200 TABLET ORAL 2 TIMES DAILY
Status: DISCONTINUED | OUTPATIENT
Start: 2024-01-01 | End: 2024-01-01

## 2024-01-01 RX ORDER — ONDANSETRON 4 MG/1
4 TABLET, ORALLY DISINTEGRATING ORAL EVERY 30 MIN PRN
Status: CANCELLED | OUTPATIENT
Start: 2024-01-01

## 2024-01-01 RX ORDER — ONDANSETRON 4 MG/1
4 TABLET, ORALLY DISINTEGRATING ORAL EVERY 6 HOURS PRN
Status: DISCONTINUED | OUTPATIENT
Start: 2024-01-01 | End: 2024-01-01 | Stop reason: HOSPADM

## 2024-01-01 RX ORDER — HYDROMORPHONE HYDROCHLORIDE 1 MG/ML
0.4 INJECTION, SOLUTION INTRAMUSCULAR; INTRAVENOUS; SUBCUTANEOUS EVERY 5 MIN PRN
Status: CANCELLED | OUTPATIENT
Start: 2024-01-01

## 2024-01-01 RX ORDER — ONDANSETRON 2 MG/ML
8 INJECTION INTRAMUSCULAR; INTRAVENOUS ONCE
Status: COMPLETED | OUTPATIENT
Start: 2024-01-01 | End: 2024-01-01

## 2024-01-01 RX ORDER — CARBOXYMETHYLCELLULOSE SODIUM 5 MG/ML
1-2 SOLUTION/ DROPS OPHTHALMIC
Status: DISCONTINUED | OUTPATIENT
Start: 2024-01-01 | End: 2024-01-01 | Stop reason: HOSPADM

## 2024-01-01 RX ORDER — FENTANYL CITRATE 50 UG/ML
25-50 INJECTION, SOLUTION INTRAMUSCULAR; INTRAVENOUS EVERY 5 MIN PRN
Status: DISCONTINUED | OUTPATIENT
Start: 2024-01-01 | End: 2024-01-01 | Stop reason: HOSPADM

## 2024-01-01 RX ORDER — NICOTINE POLACRILEX 4 MG
15-30 LOZENGE BUCCAL
Status: CANCELLED | OUTPATIENT
Start: 2024-01-01

## 2024-01-01 RX ORDER — MORPHINE SULFATE 2 MG/ML
2 INJECTION, SOLUTION INTRAMUSCULAR; INTRAVENOUS
Status: DISCONTINUED | OUTPATIENT
Start: 2024-01-01 | End: 2024-01-01

## 2024-01-01 RX ORDER — LEVALBUTEROL INHALATION SOLUTION 0.63 MG/3ML
0.63 SOLUTION RESPIRATORY (INHALATION) EVERY 6 HOURS
Status: DISCONTINUED | OUTPATIENT
Start: 2024-01-01 | End: 2024-01-01

## 2024-01-01 RX ORDER — FUROSEMIDE 40 MG
40 TABLET ORAL DAILY
Qty: 90 TABLET | Refills: 0 | Status: SHIPPED | OUTPATIENT
Start: 2024-01-01

## 2024-01-01 RX ORDER — SODIUM CHLORIDE, SODIUM LACTATE, POTASSIUM CHLORIDE, CALCIUM CHLORIDE 600; 310; 30; 20 MG/100ML; MG/100ML; MG/100ML; MG/100ML
INJECTION, SOLUTION INTRAVENOUS CONTINUOUS
Status: DISCONTINUED | OUTPATIENT
Start: 2024-01-01 | End: 2024-01-01 | Stop reason: HOSPADM

## 2024-01-01 RX ORDER — ACETAMINOPHEN 500 MG
1000 TABLET ORAL EVERY 8 HOURS PRN
Status: DISCONTINUED | OUTPATIENT
Start: 2024-01-01 | End: 2024-01-01

## 2024-01-01 RX ORDER — HEPARIN SODIUM 1000 [USP'U]/ML
2500 INJECTION, SOLUTION INTRAVENOUS; SUBCUTANEOUS ONCE
Status: DISCONTINUED | OUTPATIENT
Start: 2024-01-01 | End: 2024-01-01 | Stop reason: HOSPADM

## 2024-01-01 RX ORDER — ALBUTEROL SULFATE 0.83 MG/ML
2.5 SOLUTION RESPIRATORY (INHALATION)
Status: CANCELLED | OUTPATIENT
Start: 2024-01-01

## 2024-01-01 RX ORDER — DEXAMETHASONE 4 MG/1
8 TABLET ORAL 2 TIMES DAILY WITH MEALS
Qty: 6 TABLET | Refills: 5 | Status: SHIPPED | OUTPATIENT
Start: 2024-01-01 | End: 2024-01-01

## 2024-01-01 RX ORDER — GUAIFENESIN 200 MG/10ML
200 LIQUID ORAL EVERY 4 HOURS PRN
Status: DISCONTINUED | OUTPATIENT
Start: 2024-01-01 | End: 2024-01-01

## 2024-01-01 RX ORDER — FUROSEMIDE 40 MG
40 TABLET ORAL DAILY
Status: DISCONTINUED | OUTPATIENT
Start: 2024-01-01 | End: 2024-01-01

## 2024-01-01 RX ORDER — SODIUM CHLORIDE, SODIUM LACTATE, POTASSIUM CHLORIDE, CALCIUM CHLORIDE 600; 310; 30; 20 MG/100ML; MG/100ML; MG/100ML; MG/100ML
INJECTION, SOLUTION INTRAVENOUS CONTINUOUS
Status: CANCELLED | OUTPATIENT
Start: 2024-01-01

## 2024-01-01 RX ORDER — DILTIAZEM HYDROCHLORIDE 120 MG/1
120 CAPSULE, EXTENDED RELEASE ORAL EVERY 12 HOURS
COMMUNITY

## 2024-01-01 RX ORDER — METHYLPREDNISOLONE SODIUM SUCCINATE 125 MG/2ML
125 INJECTION, POWDER, LYOPHILIZED, FOR SOLUTION INTRAMUSCULAR; INTRAVENOUS
Status: CANCELLED
Start: 2024-01-01

## 2024-01-01 RX ORDER — DILTIAZEM HYDROCHLORIDE 120 MG/1
120 CAPSULE, COATED, EXTENDED RELEASE ORAL EVERY 12 HOURS
Status: DISCONTINUED | OUTPATIENT
Start: 2024-01-01 | End: 2024-01-01

## 2024-01-01 RX ORDER — LIDOCAINE 40 MG/G
CREAM TOPICAL
Status: CANCELLED | OUTPATIENT
Start: 2024-01-01

## 2024-01-01 RX ORDER — ALBUTEROL SULFATE 5 MG/ML
2.5 SOLUTION RESPIRATORY (INHALATION) ONCE
Status: COMPLETED | OUTPATIENT
Start: 2024-01-01 | End: 2024-01-01

## 2024-01-01 RX ORDER — CETIRIZINE HYDROCHLORIDE 10 MG/1
10 TABLET ORAL DAILY PRN
COMMUNITY
End: 2024-01-01

## 2024-01-01 RX ORDER — AMOXICILLIN 250 MG
1 CAPSULE ORAL 2 TIMES DAILY PRN
Status: DISCONTINUED | OUTPATIENT
Start: 2024-01-01 | End: 2024-01-01

## 2024-01-01 RX ORDER — DIGOXIN 125 MCG
125 TABLET ORAL EVERY OTHER DAY
Status: DISCONTINUED | OUTPATIENT
Start: 2024-01-01 | End: 2024-01-01

## 2024-01-01 RX ORDER — NALOXONE HYDROCHLORIDE 0.4 MG/ML
0.2 INJECTION, SOLUTION INTRAMUSCULAR; INTRAVENOUS; SUBCUTANEOUS
Status: DISCONTINUED | OUTPATIENT
Start: 2024-01-01 | End: 2024-01-01 | Stop reason: HOSPADM

## 2024-01-01 RX ORDER — DIGOXIN 125 MCG
125 TABLET ORAL EVERY OTHER DAY
COMMUNITY
End: 2024-01-01

## 2024-01-01 RX ORDER — ALPRAZOLAM 0.25 MG
0.5 TABLET ORAL 3 TIMES DAILY PRN
Status: DISCONTINUED | OUTPATIENT
Start: 2024-01-01 | End: 2024-01-01

## 2024-01-01 RX ORDER — MAGNESIUM OXIDE 400 MG/1
400 TABLET ORAL DAILY
COMMUNITY
End: 2024-01-01

## 2024-01-01 RX ORDER — LOSARTAN POTASSIUM 50 MG/1
50 TABLET ORAL AT BEDTIME
COMMUNITY

## 2024-01-01 RX ORDER — ONDANSETRON 2 MG/ML
8 INJECTION INTRAMUSCULAR; INTRAVENOUS ONCE
Status: CANCELLED | OUTPATIENT
Start: 2024-01-01

## 2024-01-01 RX ORDER — METOPROLOL TARTRATE 1 MG/ML
2.5 INJECTION, SOLUTION INTRAVENOUS ONCE
Status: COMPLETED | OUTPATIENT
Start: 2024-01-01 | End: 2024-01-01

## 2024-01-01 RX ORDER — MINERAL OIL/HYDROPHIL PETROLAT
OINTMENT (GRAM) TOPICAL
Status: DISCONTINUED | OUTPATIENT
Start: 2024-01-01 | End: 2024-01-01 | Stop reason: HOSPADM

## 2024-01-01 RX ORDER — NYSTATIN 100000 U/G
CREAM TOPICAL DAILY PRN
COMMUNITY
Start: 2024-01-01

## 2024-01-01 RX ORDER — NOREPINEPHRINE BITARTRATE 0.02 MG/ML
INJECTION, SOLUTION INTRAVENOUS
Status: COMPLETED
Start: 2024-01-01 | End: 2024-01-01

## 2024-01-01 RX ORDER — AMOXICILLIN 250 MG
2 CAPSULE ORAL 2 TIMES DAILY PRN
Status: DISCONTINUED | OUTPATIENT
Start: 2024-01-01 | End: 2024-01-01

## 2024-01-01 RX ORDER — MAGNESIUM OXIDE 400 MG/1
400 TABLET ORAL DAILY
Qty: 90 TABLET | Refills: 0 | Status: SHIPPED | OUTPATIENT
Start: 2024-01-01 | End: 2024-01-01

## 2024-01-01 RX ORDER — ACETAMINOPHEN 500 MG
1000 TABLET ORAL EVERY 8 HOURS PRN
COMMUNITY

## 2024-01-01 RX ORDER — ROPIVACAINE IN 0.9% SOD CHL/PF 0.1 %
.01-.125 PLASTIC BAG, INJECTION (ML) EPIDURAL CONTINUOUS
Status: DISCONTINUED | OUTPATIENT
Start: 2024-01-01 | End: 2024-01-01

## 2024-01-01 RX ORDER — NALOXONE HYDROCHLORIDE 0.4 MG/ML
0.1 INJECTION, SOLUTION INTRAMUSCULAR; INTRAVENOUS; SUBCUTANEOUS
Status: DISCONTINUED | OUTPATIENT
Start: 2024-01-01 | End: 2024-01-01

## 2024-01-01 RX ORDER — MAGNESIUM SULFATE HEPTAHYDRATE 40 MG/ML
2 INJECTION, SOLUTION INTRAVENOUS ONCE
Status: COMPLETED | OUTPATIENT
Start: 2024-01-01 | End: 2024-01-01

## 2024-01-01 RX ORDER — MORPHINE SULFATE 2 MG/ML
2 INJECTION, SOLUTION INTRAMUSCULAR; INTRAVENOUS
Status: DISCONTINUED | OUTPATIENT
Start: 2024-01-01 | End: 2024-01-01 | Stop reason: HOSPADM

## 2024-01-01 RX ORDER — BUDESONIDE 0.5 MG/2ML
0.5 INHALANT ORAL 2 TIMES DAILY
Status: DISCONTINUED | OUTPATIENT
Start: 2024-01-01 | End: 2024-01-01

## 2024-01-01 RX ORDER — LOSARTAN POTASSIUM 50 MG/1
50 TABLET ORAL AT BEDTIME
COMMUNITY
End: 2024-01-01

## 2024-01-01 RX ORDER — LOSARTAN POTASSIUM 50 MG/1
50 TABLET ORAL DAILY
Qty: 90 TABLET | Refills: 3 | Status: ON HOLD | OUTPATIENT
Start: 2024-01-01 | End: 2024-01-01

## 2024-01-01 RX ORDER — SENNOSIDES 8.6 MG
1 TABLET ORAL 2 TIMES DAILY PRN
Status: DISCONTINUED | OUTPATIENT
Start: 2024-01-01 | End: 2024-01-01

## 2024-01-01 RX ORDER — GUAIFENESIN 200 MG/10ML
200 LIQUID ORAL EVERY 4 HOURS PRN
COMMUNITY

## 2024-01-01 RX ORDER — SODIUM CHLORIDE 9 MG/ML
INJECTION, SOLUTION INTRAVENOUS CONTINUOUS PRN
Status: DISCONTINUED | OUTPATIENT
Start: 2024-01-01 | End: 2024-01-01 | Stop reason: HOSPADM

## 2024-01-01 RX ORDER — METOPROLOL TARTRATE 100 MG
200 TABLET ORAL 2 TIMES DAILY
COMMUNITY
End: 2024-01-01

## 2024-01-01 RX ORDER — IPRATROPIUM BROMIDE AND ALBUTEROL SULFATE 2.5; .5 MG/3ML; MG/3ML
3 SOLUTION RESPIRATORY (INHALATION) 2 TIMES DAILY
Status: DISCONTINUED | OUTPATIENT
Start: 2024-01-01 | End: 2024-01-01 | Stop reason: DRUGHIGH

## 2024-01-01 RX ORDER — LIDOCAINE HYDROCHLORIDE 10 MG/ML
10-20 INJECTION, SOLUTION EPIDURAL; INFILTRATION; INTRACAUDAL; PERINEURAL ONCE
Status: CANCELLED | OUTPATIENT
Start: 2024-01-01 | End: 2024-01-01

## 2024-01-01 RX ORDER — AMOXICILLIN 250 MG
2 CAPSULE ORAL 2 TIMES DAILY
Status: DISCONTINUED | OUTPATIENT
Start: 2024-01-01 | End: 2024-01-01

## 2024-01-01 RX ORDER — CEFTRIAXONE SODIUM 2 G/50ML
2 INJECTION, SOLUTION INTRAVENOUS EVERY 24 HOURS
Status: DISCONTINUED | OUTPATIENT
Start: 2024-01-01 | End: 2024-01-01

## 2024-01-01 RX ORDER — BUDESONIDE 0.5 MG/2ML
0.5 INHALANT ORAL ONCE
Status: COMPLETED | OUTPATIENT
Start: 2024-01-01 | End: 2024-01-01

## 2024-01-01 RX ORDER — LIDOCAINE HYDROCHLORIDE 10 MG/ML
10-20 INJECTION, SOLUTION EPIDURAL; INFILTRATION; INTRACAUDAL; PERINEURAL ONCE
Status: COMPLETED | OUTPATIENT
Start: 2024-01-01 | End: 2024-01-01

## 2024-01-01 RX ORDER — HYDROMORPHONE HYDROCHLORIDE 1 MG/ML
0.2 INJECTION, SOLUTION INTRAMUSCULAR; INTRAVENOUS; SUBCUTANEOUS EVERY 5 MIN PRN
Status: CANCELLED | OUTPATIENT
Start: 2024-01-01

## 2024-01-01 RX ORDER — CEFAZOLIN SODIUM/WATER 2 G/20 ML
2 SYRINGE (ML) INTRAVENOUS
Status: DISCONTINUED | OUTPATIENT
Start: 2024-01-01 | End: 2024-01-01 | Stop reason: HOSPADM

## 2024-01-01 RX ADMIN — METOPROLOL TARTRATE 2.5 MG: 5 INJECTION INTRAVENOUS at 04:17

## 2024-01-01 RX ADMIN — IPRATROPIUM BROMIDE AND ALBUTEROL SULFATE 3 ML: .5; 3 SOLUTION RESPIRATORY (INHALATION) at 05:11

## 2024-01-01 RX ADMIN — LEVALBUTEROL HYDROCHLORIDE 0.63 MG: 0.63 SOLUTION RESPIRATORY (INHALATION) at 07:15

## 2024-01-01 RX ADMIN — BUDESONIDE INHALATION 0.5 MG: 0.5 SUSPENSION RESPIRATORY (INHALATION) at 07:15

## 2024-01-01 RX ADMIN — MORPHINE SULFATE 2 MG: 2 INJECTION, SOLUTION INTRAMUSCULAR; INTRAVENOUS at 00:07

## 2024-01-01 RX ADMIN — OXYCODONE HYDROCHLORIDE 5 MG: 5 TABLET ORAL at 04:16

## 2024-01-01 RX ADMIN — BUDESONIDE INHALATION 0.5 MG: 0.5 SUSPENSION RESPIRATORY (INHALATION) at 19:32

## 2024-01-01 RX ADMIN — METHYLPREDNISOLONE SODIUM SUCCINATE 125 MG: 125 INJECTION, POWDER, FOR SOLUTION INTRAMUSCULAR; INTRAVENOUS at 03:31

## 2024-01-01 RX ADMIN — LEVALBUTEROL HYDROCHLORIDE 0.63 MG: 0.63 SOLUTION RESPIRATORY (INHALATION) at 02:04

## 2024-01-01 RX ADMIN — MORPHINE SULFATE 2 MG: 2 INJECTION, SOLUTION INTRAMUSCULAR; INTRAVENOUS at 09:36

## 2024-01-01 RX ADMIN — IPRATROPIUM BROMIDE AND ALBUTEROL SULFATE 3 ML: .5; 3 SOLUTION RESPIRATORY (INHALATION) at 19:54

## 2024-01-01 RX ADMIN — IPRATROPIUM BROMIDE AND ALBUTEROL SULFATE 3 ML: .5; 3 SOLUTION RESPIRATORY (INHALATION) at 12:11

## 2024-01-01 RX ADMIN — METOPROLOL TARTRATE 200 MG: 100 TABLET, FILM COATED ORAL at 05:31

## 2024-01-01 RX ADMIN — IPRATROPIUM BROMIDE AND ALBUTEROL SULFATE 3 ML: .5; 3 SOLUTION RESPIRATORY (INHALATION) at 17:33

## 2024-01-01 RX ADMIN — LEVALBUTEROL HYDROCHLORIDE 0.63 MG: 0.63 SOLUTION RESPIRATORY (INHALATION) at 00:04

## 2024-01-01 RX ADMIN — DIGOXIN 125 MCG: 125 TABLET ORAL at 10:10

## 2024-01-01 RX ADMIN — MORPHINE SULFATE 2 MG: 2 INJECTION, SOLUTION INTRAMUSCULAR; INTRAVENOUS at 12:31

## 2024-01-01 RX ADMIN — LEVALBUTEROL HYDROCHLORIDE 0.63 MG: 0.63 SOLUTION RESPIRATORY (INHALATION) at 15:06

## 2024-01-01 RX ADMIN — LEVALBUTEROL HYDROCHLORIDE 0.63 MG: 0.63 SOLUTION RESPIRATORY (INHALATION) at 11:06

## 2024-01-01 RX ADMIN — LORAZEPAM 0.5 MG: 0.5 TABLET ORAL at 21:49

## 2024-01-01 RX ADMIN — DILTIAZEM HYDROCHLORIDE 120 MG: 120 CAPSULE, COATED, EXTENDED RELEASE ORAL at 05:31

## 2024-01-01 RX ADMIN — IPRATROPIUM BROMIDE AND ALBUTEROL SULFATE 3 ML: .5; 3 SOLUTION RESPIRATORY (INHALATION) at 03:36

## 2024-01-01 RX ADMIN — MORPHINE SULFATE 2 MG: 2 INJECTION, SOLUTION INTRAMUSCULAR; INTRAVENOUS at 09:12

## 2024-01-01 RX ADMIN — MORPHINE SULFATE 2 MG: 2 INJECTION, SOLUTION INTRAMUSCULAR; INTRAVENOUS at 15:13

## 2024-01-01 RX ADMIN — IPRATROPIUM BROMIDE AND ALBUTEROL SULFATE 3 ML: .5; 3 SOLUTION RESPIRATORY (INHALATION) at 14:44

## 2024-01-01 RX ADMIN — MORPHINE SULFATE 2 MG: 2 INJECTION, SOLUTION INTRAMUSCULAR; INTRAVENOUS at 13:11

## 2024-01-01 RX ADMIN — CEFTRIAXONE SODIUM 2 G: 2 INJECTION, SOLUTION INTRAVENOUS at 19:28

## 2024-01-01 RX ADMIN — MORPHINE SULFATE 2 MG: 2 INJECTION, SOLUTION INTRAMUSCULAR; INTRAVENOUS at 17:10

## 2024-01-01 RX ADMIN — ONDANSETRON 8 MG: 2 INJECTION INTRAMUSCULAR; INTRAVENOUS at 11:28

## 2024-01-01 RX ADMIN — LORAZEPAM 0.5 MG: 0.5 TABLET ORAL at 03:16

## 2024-01-01 RX ADMIN — MORPHINE SULFATE 2 MG: 2 INJECTION, SOLUTION INTRAMUSCULAR; INTRAVENOUS at 17:23

## 2024-01-01 RX ADMIN — METOPROLOL TARTRATE 200 MG: 100 TABLET, FILM COATED ORAL at 09:42

## 2024-01-01 RX ADMIN — LEVALBUTEROL HYDROCHLORIDE 0.63 MG: 0.63 SOLUTION RESPIRATORY (INHALATION) at 04:14

## 2024-01-01 RX ADMIN — IPRATROPIUM BROMIDE AND ALBUTEROL SULFATE 3 ML: .5; 3 SOLUTION RESPIRATORY (INHALATION) at 14:01

## 2024-01-01 RX ADMIN — BUDESONIDE INHALATION 0.5 MG: 0.5 SUSPENSION RESPIRATORY (INHALATION) at 19:24

## 2024-01-01 RX ADMIN — IPRATROPIUM BROMIDE 0.5 MG: 0.5 SOLUTION RESPIRATORY (INHALATION) at 07:12

## 2024-01-01 RX ADMIN — MORPHINE SULFATE 2 MG: 2 INJECTION, SOLUTION INTRAMUSCULAR; INTRAVENOUS at 06:55

## 2024-01-01 RX ADMIN — LORAZEPAM 0.5 MG: 2 INJECTION INTRAMUSCULAR; INTRAVENOUS at 05:06

## 2024-01-01 RX ADMIN — BUDESONIDE INHALATION 0.5 MG: 0.5 SUSPENSION RESPIRATORY (INHALATION) at 06:55

## 2024-01-01 RX ADMIN — DILTIAZEM HYDROCHLORIDE 120 MG: 120 CAPSULE, COATED, EXTENDED RELEASE ORAL at 07:39

## 2024-01-01 RX ADMIN — LEVALBUTEROL HYDROCHLORIDE 0.63 MG: 0.63 SOLUTION RESPIRATORY (INHALATION) at 19:23

## 2024-01-01 RX ADMIN — LORAZEPAM 0.5 MG: 0.5 TABLET ORAL at 20:09

## 2024-01-01 RX ADMIN — METOPROLOL TARTRATE 100 MG: 100 TABLET, FILM COATED ORAL at 01:57

## 2024-01-01 RX ADMIN — MORPHINE SULFATE 2 MG: 2 INJECTION, SOLUTION INTRAMUSCULAR; INTRAVENOUS at 08:09

## 2024-01-01 RX ADMIN — ZINC OXIDE: 200 OINTMENT TOPICAL at 10:14

## 2024-01-01 RX ADMIN — SODIUM CHLORIDE: 9 INJECTION, SOLUTION INTRAVENOUS at 18:53

## 2024-01-01 RX ADMIN — DILTIAZEM HYDROCHLORIDE 240 MG: 240 CAPSULE, EXTENDED RELEASE ORAL at 07:51

## 2024-01-01 RX ADMIN — OXYCODONE HYDROCHLORIDE 5 MG: 5 TABLET ORAL at 12:07

## 2024-01-01 RX ADMIN — LEVALBUTEROL HYDROCHLORIDE 0.63 MG: 0.63 SOLUTION RESPIRATORY (INHALATION) at 15:03

## 2024-01-01 RX ADMIN — DILTIAZEM HYDROCHLORIDE 360 MG: 180 CAPSULE, COATED, EXTENDED RELEASE ORAL at 12:07

## 2024-01-01 RX ADMIN — METOPROLOL TARTRATE 200 MG: 100 TABLET, FILM COATED ORAL at 08:03

## 2024-01-01 RX ADMIN — Medication 1 MG: at 20:09

## 2024-01-01 RX ADMIN — LORAZEPAM 1 MG: 2 INJECTION INTRAMUSCULAR; INTRAVENOUS at 20:29

## 2024-01-01 RX ADMIN — IPRATROPIUM BROMIDE AND ALBUTEROL SULFATE 3 ML: .5; 3 SOLUTION RESPIRATORY (INHALATION) at 06:54

## 2024-01-01 RX ADMIN — MORPHINE SULFATE 2 MG: 2 INJECTION, SOLUTION INTRAMUSCULAR; INTRAVENOUS at 20:16

## 2024-01-01 RX ADMIN — BUDESONIDE INHALATION 0.5 MG: 0.5 SUSPENSION RESPIRATORY (INHALATION) at 06:12

## 2024-01-01 RX ADMIN — NYSTATIN: 100000 CREAM TOPICAL at 16:21

## 2024-01-01 RX ADMIN — IPRATROPIUM BROMIDE AND ALBUTEROL SULFATE 3 ML: .5; 3 SOLUTION RESPIRATORY (INHALATION) at 09:59

## 2024-01-01 RX ADMIN — LEVALBUTEROL HYDROCHLORIDE 0.63 MG: 0.63 SOLUTION RESPIRATORY (INHALATION) at 19:32

## 2024-01-01 RX ADMIN — DILTIAZEM HYDROCHLORIDE 120 MG: 120 CAPSULE, COATED, EXTENDED RELEASE ORAL at 21:42

## 2024-01-01 RX ADMIN — IPRATROPIUM BROMIDE AND ALBUTEROL SULFATE 3 ML: .5; 3 SOLUTION RESPIRATORY (INHALATION) at 00:27

## 2024-01-01 RX ADMIN — IPRATROPIUM BROMIDE AND ALBUTEROL SULFATE 3 ML: .5; 3 SOLUTION RESPIRATORY (INHALATION) at 06:12

## 2024-01-01 RX ADMIN — MORPHINE SULFATE 2 MG: 2 INJECTION, SOLUTION INTRAMUSCULAR; INTRAVENOUS at 17:51

## 2024-01-01 RX ADMIN — IPRATROPIUM BROMIDE AND ALBUTEROL SULFATE 3 ML: .5; 3 SOLUTION RESPIRATORY (INHALATION) at 00:18

## 2024-01-01 RX ADMIN — METOPROLOL TARTRATE 200 MG: 100 TABLET, FILM COATED ORAL at 07:39

## 2024-01-01 RX ADMIN — CEFTRIAXONE SODIUM 2 G: 2 INJECTION, SOLUTION INTRAVENOUS at 18:20

## 2024-01-01 RX ADMIN — ALBUTEROL SULFATE 2.5 MG: 2.5 SOLUTION RESPIRATORY (INHALATION) at 16:07

## 2024-01-01 RX ADMIN — IPRATROPIUM BROMIDE 0.5 MG: 0.5 SOLUTION RESPIRATORY (INHALATION) at 13:29

## 2024-01-01 RX ADMIN — GADOBUTROL 7.5 ML: 604.72 INJECTION INTRAVENOUS at 12:04

## 2024-01-01 RX ADMIN — METOPROLOL TARTRATE 200 MG: 100 TABLET, FILM COATED ORAL at 20:09

## 2024-01-01 RX ADMIN — MIDAZOLAM 1 MG: 1 INJECTION INTRAMUSCULAR; INTRAVENOUS at 09:37

## 2024-01-01 RX ADMIN — FENTANYL CITRATE 50 MCG: 50 INJECTION, SOLUTION INTRAMUSCULAR; INTRAVENOUS at 09:38

## 2024-01-01 RX ADMIN — IOPAMIDOL 71 ML: 755 INJECTION, SOLUTION INTRAVENOUS at 04:33

## 2024-01-01 RX ADMIN — LEVALBUTEROL HYDROCHLORIDE 0.63 MG: 0.63 SOLUTION RESPIRATORY (INHALATION) at 07:12

## 2024-01-01 RX ADMIN — MAGNESIUM SULFATE HEPTAHYDRATE 2 G: 40 INJECTION, SOLUTION INTRAVENOUS at 12:38

## 2024-01-01 RX ADMIN — IPRATROPIUM BROMIDE AND ALBUTEROL SULFATE 3 ML: .5; 3 SOLUTION RESPIRATORY (INHALATION) at 16:42

## 2024-01-01 RX ADMIN — ACETAMINOPHEN 1000 MG: 500 TABLET ORAL at 19:51

## 2024-01-01 RX ADMIN — IPRATROPIUM BROMIDE 0.5 MG: 0.5 SOLUTION RESPIRATORY (INHALATION) at 23:27

## 2024-01-01 RX ADMIN — MORPHINE SULFATE 2 MG: 2 INJECTION, SOLUTION INTRAMUSCULAR; INTRAVENOUS at 15:40

## 2024-01-01 RX ADMIN — IPRATROPIUM BROMIDE AND ALBUTEROL SULFATE 3 ML: .5; 3 SOLUTION RESPIRATORY (INHALATION) at 20:19

## 2024-01-01 RX ADMIN — LIDOCAINE HYDROCHLORIDE 10 ML: 10 INJECTION, SOLUTION EPIDURAL; INFILTRATION; INTRACAUDAL; PERINEURAL at 09:39

## 2024-01-01 RX ADMIN — SENNOSIDES AND DOCUSATE SODIUM 1 TABLET: 8.6; 5 TABLET ORAL at 09:43

## 2024-01-01 RX ADMIN — METOPROLOL TARTRATE 200 MG: 100 TABLET, FILM COATED ORAL at 20:29

## 2024-01-01 RX ADMIN — MORPHINE SULFATE 2 MG: 2 INJECTION, SOLUTION INTRAMUSCULAR; INTRAVENOUS at 09:57

## 2024-01-01 RX ADMIN — LEVALBUTEROL HYDROCHLORIDE 0.63 MG: 0.63 SOLUTION RESPIRATORY (INHALATION) at 11:16

## 2024-01-01 RX ADMIN — MORPHINE SULFATE 2 MG: 2 INJECTION, SOLUTION INTRAMUSCULAR; INTRAVENOUS at 11:51

## 2024-01-01 RX ADMIN — Medication 1 MG: at 20:29

## 2024-01-01 RX ADMIN — SODIUM CHLORIDE 1000 ML: 9 INJECTION, SOLUTION INTRAVENOUS at 15:10

## 2024-01-01 RX ADMIN — ACETAMINOPHEN 1000 MG: 500 TABLET ORAL at 22:28

## 2024-01-01 RX ADMIN — MORPHINE SULFATE 2 MG: 2 INJECTION, SOLUTION INTRAMUSCULAR; INTRAVENOUS at 08:02

## 2024-01-01 RX ADMIN — CEFTRIAXONE SODIUM 2 G: 2 INJECTION, SOLUTION INTRAVENOUS at 17:44

## 2024-01-01 RX ADMIN — Medication 250 ML: at 11:09

## 2024-01-01 RX ADMIN — NOREPINEPHRINE BITARTRATE 0.03 MCG/KG/MIN: 0.02 INJECTION, SOLUTION INTRAVENOUS at 00:00

## 2024-01-01 RX ADMIN — LORAZEPAM 0.5 MG: 0.5 TABLET ORAL at 04:42

## 2024-01-01 RX ADMIN — BUDESONIDE INHALATION 0.5 MG: 0.5 SUSPENSION RESPIRATORY (INHALATION) at 14:46

## 2024-01-01 RX ADMIN — GUAIFENESIN 200 MG: 100 SOLUTION ORAL at 17:44

## 2024-01-01 RX ADMIN — MORPHINE SULFATE 2 MG: 2 INJECTION, SOLUTION INTRAMUSCULAR; INTRAVENOUS at 11:39

## 2024-01-01 RX ADMIN — SENNOSIDES AND DOCUSATE SODIUM 2 TABLET: 8.6; 5 TABLET ORAL at 08:03

## 2024-01-01 RX ADMIN — DILTIAZEM HYDROCHLORIDE 120 MG: 120 CAPSULE, COATED, EXTENDED RELEASE ORAL at 20:29

## 2024-01-01 RX ADMIN — IOPAMIDOL 84 ML: 755 INJECTION, SOLUTION INTRAVENOUS at 11:57

## 2024-01-01 RX ADMIN — MORPHINE SULFATE 2 MG: 2 INJECTION, SOLUTION INTRAMUSCULAR; INTRAVENOUS at 12:39

## 2024-01-01 RX ADMIN — BUDESONIDE INHALATION 0.5 MG: 0.5 SUSPENSION RESPIRATORY (INHALATION) at 19:54

## 2024-01-01 RX ADMIN — DILTIAZEM HYDROCHLORIDE 120 MG: 120 CAPSULE, COATED, EXTENDED RELEASE ORAL at 09:42

## 2024-01-01 RX ADMIN — SODIUM CHLORIDE 500 ML: 9 INJECTION, SOLUTION INTRAVENOUS at 15:58

## 2024-01-01 RX ADMIN — LEVALBUTEROL HYDROCHLORIDE 0.63 MG: 0.63 SOLUTION RESPIRATORY (INHALATION) at 13:29

## 2024-01-01 RX ADMIN — FUROSEMIDE 40 MG: 40 TABLET ORAL at 10:10

## 2024-01-01 RX ADMIN — LORAZEPAM 0.5 MG: 0.5 TABLET ORAL at 10:42

## 2024-01-01 RX ADMIN — IPRATROPIUM BROMIDE 0.5 MG: 0.5 SOLUTION RESPIRATORY (INHALATION) at 02:04

## 2024-01-01 RX ADMIN — LEVALBUTEROL HYDROCHLORIDE 0.63 MG: 0.63 SOLUTION RESPIRATORY (INHALATION) at 23:27

## 2024-01-01 RX ADMIN — ONDANSETRON 4 MG: 4 TABLET, ORALLY DISINTEGRATING ORAL at 17:53

## 2024-01-01 RX ADMIN — GEMCITABINE 1400 MG: 38 INJECTION, SOLUTION INTRAVENOUS at 12:09

## 2024-01-01 RX ADMIN — BUDESONIDE INHALATION 0.5 MG: 0.5 SUSPENSION RESPIRATORY (INHALATION) at 20:16

## 2024-01-01 RX ADMIN — NOREPINEPHRINE BITARTRATE 0.1 MCG/KG/MIN: 0.02 INJECTION, SOLUTION INTRAVENOUS at 08:41

## 2024-01-01 RX ADMIN — FLUDEOXYGLUCOSE F 18 12.01 MILLICURIE: 200 INJECTION, SOLUTION INTRAVENOUS at 08:14

## 2024-01-01 RX ADMIN — DILTIAZEM HYDROCHLORIDE 120 MG: 120 CAPSULE, COATED, EXTENDED RELEASE ORAL at 20:09

## 2024-01-01 RX ADMIN — PHYTONADIONE 5 MG: 10 INJECTION, EMULSION INTRAMUSCULAR; INTRAVENOUS; SUBCUTANEOUS at 08:45

## 2024-01-01 RX ADMIN — IOPAMIDOL 84 ML: 755 INJECTION, SOLUTION INTRAVENOUS at 11:14

## 2024-01-01 RX ADMIN — DIGOXIN 125 MCG: 125 TABLET ORAL at 08:03

## 2024-01-01 ASSESSMENT — COLUMBIA-SUICIDE SEVERITY RATING SCALE - C-SSRS
1. IN THE PAST MONTH, HAVE YOU WISHED YOU WERE DEAD OR WISHED YOU COULD GO TO SLEEP AND NOT WAKE UP?: NO
6. HAVE YOU EVER DONE ANYTHING, STARTED TO DO ANYTHING, OR PREPARED TO DO ANYTHING TO END YOUR LIFE?: NO
2. HAVE YOU ACTUALLY HAD ANY THOUGHTS OF KILLING YOURSELF IN THE PAST MONTH?: NO
2. HAVE YOU ACTUALLY HAD ANY THOUGHTS OF KILLING YOURSELF IN THE PAST MONTH?: NO
1. IN THE PAST MONTH, HAVE YOU WISHED YOU WERE DEAD OR WISHED YOU COULD GO TO SLEEP AND NOT WAKE UP?: NO
2. HAVE YOU ACTUALLY HAD ANY THOUGHTS OF KILLING YOURSELF IN THE PAST MONTH?: NO
6. HAVE YOU EVER DONE ANYTHING, STARTED TO DO ANYTHING, OR PREPARED TO DO ANYTHING TO END YOUR LIFE?: NO
1. IN THE PAST MONTH, HAVE YOU WISHED YOU WERE DEAD OR WISHED YOU COULD GO TO SLEEP AND NOT WAKE UP?: NO
1. IN THE PAST MONTH, HAVE YOU WISHED YOU WERE DEAD OR WISHED YOU COULD GO TO SLEEP AND NOT WAKE UP?: NO
2. HAVE YOU ACTUALLY HAD ANY THOUGHTS OF KILLING YOURSELF IN THE PAST MONTH?: NO
6. HAVE YOU EVER DONE ANYTHING, STARTED TO DO ANYTHING, OR PREPARED TO DO ANYTHING TO END YOUR LIFE?: NO
6. HAVE YOU EVER DONE ANYTHING, STARTED TO DO ANYTHING, OR PREPARED TO DO ANYTHING TO END YOUR LIFE?: NO

## 2024-01-01 ASSESSMENT — ACTIVITIES OF DAILY LIVING (ADL)
ADLS_ACUITY_SCORE: 30
ADLS_ACUITY_SCORE: 39
ADLS_ACUITY_SCORE: 38
ADLS_ACUITY_SCORE: 35
ADLS_ACUITY_SCORE: 39
ADLS_ACUITY_SCORE: 38
ADLS_ACUITY_SCORE: 36
ADLS_ACUITY_SCORE: 27
ADLS_ACUITY_SCORE: 39
ADLS_ACUITY_SCORE: 37
ADLS_ACUITY_SCORE: 33
ADLS_ACUITY_SCORE: 39
ADLS_ACUITY_SCORE: 33
ADLS_ACUITY_SCORE: 30
ADLS_ACUITY_SCORE: 39
ADLS_ACUITY_SCORE: 33
ADLS_ACUITY_SCORE: 39
ADLS_ACUITY_SCORE: 33
ADLS_ACUITY_SCORE: 38
ADLS_ACUITY_SCORE: 36
ADLS_ACUITY_SCORE: 33
ADLS_ACUITY_SCORE: 27
ADLS_ACUITY_SCORE: 37
ADLS_ACUITY_SCORE: 30
ADLS_ACUITY_SCORE: 36
ADLS_ACUITY_SCORE: 35
ADLS_ACUITY_SCORE: 36
ADLS_ACUITY_SCORE: 30
ADLS_ACUITY_SCORE: 38
ADLS_ACUITY_SCORE: 37
ADLS_ACUITY_SCORE: 39
ADLS_ACUITY_SCORE: 36
ADLS_ACUITY_SCORE: 39
ADLS_ACUITY_SCORE: 37
ADLS_ACUITY_SCORE: 30
ADLS_ACUITY_SCORE: 39
ADLS_ACUITY_SCORE: 36
ADLS_ACUITY_SCORE: 37
ADLS_ACUITY_SCORE: 38
ADLS_ACUITY_SCORE: 39
ADLS_ACUITY_SCORE: 36
ADLS_ACUITY_SCORE: 39
ADLS_ACUITY_SCORE: 36
ADLS_ACUITY_SCORE: 39
ADLS_ACUITY_SCORE: 30
ADLS_ACUITY_SCORE: 35
ADLS_ACUITY_SCORE: 38
ADLS_ACUITY_SCORE: 38
ADLS_ACUITY_SCORE: 39
ADLS_ACUITY_SCORE: 38
ADLS_ACUITY_SCORE: 39
ADLS_ACUITY_SCORE: 37
ADLS_ACUITY_SCORE: 39
ADLS_ACUITY_SCORE: 39
ADLS_ACUITY_SCORE: 38
ADLS_ACUITY_SCORE: 39
ADLS_ACUITY_SCORE: 36
ADLS_ACUITY_SCORE: 38
ADLS_ACUITY_SCORE: 36
ADLS_ACUITY_SCORE: 39
ADLS_ACUITY_SCORE: 38
ADLS_ACUITY_SCORE: 38
ADLS_ACUITY_SCORE: 36
ADLS_ACUITY_SCORE: 35
ADLS_ACUITY_SCORE: 39
ADLS_ACUITY_SCORE: 36
ADLS_ACUITY_SCORE: 33
ADLS_ACUITY_SCORE: 39
ADLS_ACUITY_SCORE: 36
ADLS_ACUITY_SCORE: 33
ADLS_ACUITY_SCORE: 36
ADLS_ACUITY_SCORE: 30
ADLS_ACUITY_SCORE: 36
ADLS_ACUITY_SCORE: 36
ADLS_ACUITY_SCORE: 38
ADLS_ACUITY_SCORE: 36
ADLS_ACUITY_SCORE: 39
ADLS_ACUITY_SCORE: 39
ADLS_ACUITY_SCORE: 33
ADLS_ACUITY_SCORE: 39
ADLS_ACUITY_SCORE: 38
ADLS_ACUITY_SCORE: 39
ADLS_ACUITY_SCORE: 38
ADLS_ACUITY_SCORE: 36
ADLS_ACUITY_SCORE: 38
ADLS_ACUITY_SCORE: 39
ADLS_ACUITY_SCORE: 35
ADLS_ACUITY_SCORE: 39
ADLS_ACUITY_SCORE: 39
ADLS_ACUITY_SCORE: 37
ADLS_ACUITY_SCORE: 38
ADLS_ACUITY_SCORE: 36
ADLS_ACUITY_SCORE: 30
ADLS_ACUITY_SCORE: 30
ADLS_ACUITY_SCORE: 37
ADLS_ACUITY_SCORE: 30
ADLS_ACUITY_SCORE: 33
ADLS_ACUITY_SCORE: 36
ADLS_ACUITY_SCORE: 39
ADLS_ACUITY_SCORE: 36
ADLS_ACUITY_SCORE: 37
ADLS_ACUITY_SCORE: 33
ADLS_ACUITY_SCORE: 36
ADLS_ACUITY_SCORE: 36
ADLS_ACUITY_SCORE: 39
ADLS_ACUITY_SCORE: 37
ADLS_ACUITY_SCORE: 38
ADLS_ACUITY_SCORE: 33
ADLS_ACUITY_SCORE: 39
ADLS_ACUITY_SCORE: 30
ADLS_ACUITY_SCORE: 35
ADLS_ACUITY_SCORE: 37
ADLS_ACUITY_SCORE: 37
ADLS_ACUITY_SCORE: 36
ADLS_ACUITY_SCORE: 33
ADLS_ACUITY_SCORE: 36
ADLS_ACUITY_SCORE: 36
ADLS_ACUITY_SCORE: 27
ADLS_ACUITY_SCORE: 37
ADLS_ACUITY_SCORE: 37
ADLS_ACUITY_SCORE: 39
ADLS_ACUITY_SCORE: 39
ADLS_ACUITY_SCORE: 30
ADLS_ACUITY_SCORE: 37
ADLS_ACUITY_SCORE: 30
ADLS_ACUITY_SCORE: 37
ADLS_ACUITY_SCORE: 33
ADLS_ACUITY_SCORE: 30
ADLS_ACUITY_SCORE: 36
ADLS_ACUITY_SCORE: 39
ADLS_ACUITY_SCORE: 33
ADLS_ACUITY_SCORE: 39
ADLS_ACUITY_SCORE: 36
ADLS_ACUITY_SCORE: 36
ADLS_ACUITY_SCORE: 30
ADLS_ACUITY_SCORE: 37
ADLS_ACUITY_SCORE: 36
ADLS_ACUITY_SCORE: 36
ADLS_ACUITY_SCORE: 37
ADLS_ACUITY_SCORE: 38
ADLS_ACUITY_SCORE: 39
ADLS_ACUITY_SCORE: 30
ADLS_ACUITY_SCORE: 39
ADLS_ACUITY_SCORE: 39
ADLS_ACUITY_SCORE: 38
ADLS_ACUITY_SCORE: 39
ADLS_ACUITY_SCORE: 33
ADLS_ACUITY_SCORE: 38
ADLS_ACUITY_SCORE: 38
ADLS_ACUITY_SCORE: 37
ADLS_ACUITY_SCORE: 37
ADLS_ACUITY_SCORE: 25
ADLS_ACUITY_SCORE: 39
ADLS_ACUITY_SCORE: 43
ADLS_ACUITY_SCORE: 39
ADLS_ACUITY_SCORE: 30
ADLS_ACUITY_SCORE: 38

## 2024-01-01 ASSESSMENT — PAIN SCALES - GENERAL
PAINLEVEL: NO PAIN (0)

## 2024-01-01 ASSESSMENT — ENCOUNTER SYMPTOMS
WHEEZING: 1
DIZZINESS: 0
CHEST TIGHTNESS: 0
HEMATURIA: 0
FATIGUE: 1
NAUSEA: 0
ANAL BLEEDING: 0
COUGH: 1
BRUISES/BLEEDS EASILY: 1
COUGH: 1
STRIDOR: 0
VOMITING: 0
CHILLS: 0
NUMBNESS: 0
COLOR CHANGE: 0
ABDOMINAL PAIN: 0
HEADACHES: 0
DIAPHORESIS: 0
WEAKNESS: 1
SHORTNESS OF BREATH: 1
SHORTNESS OF BREATH: 1
WOUND: 0
VOICE CHANGE: 0
ABDOMINAL PAIN: 0
DYSRHYTHMIAS: 1
PALPITATIONS: 0
BACK PAIN: 0
ABDOMINAL DISTENTION: 0
FEVER: 0
FLANK PAIN: 0
ACTIVITY CHANGE: 1
NECK STIFFNESS: 0
ARTHRALGIAS: 0
BLOOD IN STOOL: 0
CONFUSION: 0
LIGHT-HEADEDNESS: 0
DIZZINESS: 0
MYALGIAS: 0
DYSURIA: 0
NECK PAIN: 0

## 2024-01-01 ASSESSMENT — PATIENT HEALTH QUESTIONNAIRE - PHQ9: SUM OF ALL RESPONSES TO PHQ QUESTIONS 1-9: 4

## 2024-01-01 ASSESSMENT — LIFESTYLE VARIABLES: TOBACCO_USE: 0

## 2024-01-03 NOTE — PATIENT INSTRUCTIONS
Thank you for allowing Dr JULIAN Nguyen and our  team to participate in your care. Please call our office at [phone] with scheduling questions or if you need to cancel or change your appointment. With any other questions or concerns you may call cardiology nurse at  [phone].      Follow up in 6 months.     If you experience chest pain, chest pressure, chest tightness, shortness of breath, fainting, lightheadedness, nausea, vomiting, or other concerning symptoms, please report to the Emergency Department or call 911. These symptoms may be emergent, and best treated in the Emergency Department.

## 2024-01-03 NOTE — LETTER
January 3, 2024      Kerri Gonsales  [address]  [place] [address]        Dear ,    We are writing to inform you of your test results.    Here is a copy of your lab results.  Your glucose/sugar was elevated at 177.  Your cholesterol was acceptable.  Your Hemoglobin A1c which is a 3-month average of your sugars in which you do not need to be fasting came back at 7.8%, which is in the diabetic range.  Normal is less than 6.0%.  Diabetes is 6.5% or higher.  Thyroid function is normal with a normal TSH.  Your magnesium level is low at 1.5.  I did send a prescription for magnesium to your pharmacy.  Your BNP which is a marker for heart failure/fluid overload is elevated.  This has been elevated in the past.  Your digoxin level mildly reduced at 0.5.  Otherwise, your labs look pretty good.    Resulted Orders   CBC with platelets   Result Value Ref Range    WBC Count 6.6 4.0 - 11.0 10e3/uL    RBC Count 4.20 3.80 - 5.20 10e6/uL    Hemoglobin 13.0 11.7 - 15.7 g/dL    Hematocrit 38.0 35.0 - 47.0 %    MCV 91 78 - 100 fL    MCH 31.0 26.5 - 33.0 pg    MCHC 34.2 31.5 - 36.5 g/dL    RDW 13.8 10.0 - 15.0 %    Platelet Count 236 150 - 450 10e3/uL   Comprehensive metabolic panel   Result Value Ref Range    Sodium 135 135 - 145 mmol/L      Comment:      Reference intervals for this test were updated on 09/26/2023 to more accurately reflect our healthy population. There may be differences in the flagging of prior results with similar values performed with this method. Interpretation of those prior results can be made in the context of the updated reference intervals.     Potassium 3.7 3.4 - 5.3 mmol/L    Carbon Dioxide (CO2) 30 (H) 22 - 29 mmol/L    Anion Gap 10 7 - 15 mmol/L    Urea Nitrogen 16.5 8.0 - 23.0 mg/dL    Creatinine 0.82 0.51 - 0.95 mg/dL    GFR Estimate 72 >60 mL/min/1.73m2    Calcium 9.3 8.8 - 10.2 mg/dL    Chloride 95 (L) 98 - 107 mmol/L    Glucose 177 (H) 70 - 99 mg/dL    Alkaline Phosphatase 71 40 - 150 U/L       Comment:      Reference intervals for this test were updated on 11/14/2023 to more accurately reflect our healthy population. There may be differences in the flagging of prior results with similar values performed with this method. Interpretation of those prior results can be made in the context of the updated reference intervals.    AST 32 0 - 45 U/L      Comment:      Reference intervals for this test were updated on 6/12/2023 to more accurately reflect our healthy population. There may be differences in the flagging of prior results with similar values performed with this method. Interpretation of those prior results can be made in the context of the updated reference intervals.    ALT 22 0 - 50 U/L      Comment:      Reference intervals for this test were updated on 6/12/2023 to more accurately reflect our healthy population. There may be differences in the flagging of prior results with similar values performed with this method. Interpretation of those prior results can be made in the context of the updated reference intervals.      Protein Total 7.6 6.4 - 8.3 g/dL    Albumin 4.3 3.5 - 5.2 g/dL    Bilirubin Total 0.4 <=1.2 mg/dL   Lipid Profile   Result Value Ref Range    Cholesterol 172 <200 mg/dL    Triglycerides 88 <150 mg/dL    Direct Measure HDL 61 >=50 mg/dL    LDL Cholesterol Calculated 93 <=100 mg/dL    Non HDL Cholesterol 111 <130 mg/dL    Patient Fasting > 8hrs? No     Narrative    Cholesterol  Desirable:  <200 mg/dL    Triglycerides  Normal:  Less than 150 mg/dL  Borderline High:  150-199 mg/dL  High:  200-499 mg/dL  Very High:  Greater than or equal to 500 mg/dL    Direct Measure HDL  Female:  Greater than or equal to 50 mg/dL   Male:  Greater than or equal to 40 mg/dL    LDL Cholesterol  Desirable:  <100mg/dL  Above Desirable:  100-129 mg/dL   Borderline High:  130-159 mg/dL   High:  160-189 mg/dL   Very High:  >= 190 mg/dL    Non HDL Cholesterol  Desirable:  130 mg/dL  Above Desirable:   130-159 mg/dL  Borderline High:  160-189 mg/dL  High:  190-219 mg/dL  Very High:  Greater than or equal to 220 mg/dL   Hemoglobin A1c   Result Value Ref Range    Estimated Average Glucose 177 mg/dL    Hemoglobin A1C 7.8 (H) <5.7 %      Comment:      Normal <5.7%   Prediabetes 5.7-6.4%    Diabetes 6.5% or higher     Note: Adopted from ADA consensus guidelines.   TSH with free T4 reflex   Result Value Ref Range    TSH 0.64 0.30 - 4.20 uIU/mL   Magnesium   Result Value Ref Range    Magnesium 1.5 (L) 1.7 - 2.3 mg/dL   N terminal pro BNP outpatient   Result Value Ref Range    N Terminal Pro BNP Outpatient 2,177 (H) 0 - 1,800 pg/mL      Comment:      Reference range shown and results flagged as abnormal are for the outpatient, non acute settings. Establishing a baseline value for each individual patient is useful for follow-up.    Suggested inpatient cut points for confirming diagnosis of CHF in an acute setting are:  >450 pg/mL (age 18 to less than 50)  >900 pg/mL (age 50 to less than 75)  >1800 pg/mL (75 yrs and older)    An inpatient or emergency department NT-proPBNP <300 pg/mL effectively rules out acute CHF, with 99% negative predictive value.       Digoxin level   Result Value Ref Range    Digoxin 0.5 (L) 0.6 - 2.0 ng/mL      Comment:      Therapeutic Range:  Adults: 0.6-1.2 ng/mL    The reference range for infants (less than 3 mo) is thought to be 3.0-4.0 ng/mL; infants tolerate more digoxin because they have more binding sites and an increased metabolic rate.       If you have any questions or concerns, please call the clinic at the number listed above.       Sincerely,      Abel Nguyen, DO

## 2024-01-03 NOTE — PROGRESS NOTES
[facility] HEART CARE   CARDIOLOGY PROGRESS NOTE     Chief Complaint   Patient presents with    Follow Up     6 month follow  up          Diagnosis:  1.  A-fib, new on 2/12/2021.   -Digoxin, metoprolol, dilt, and Coumadin. H/O Amiodarone.   2.  Atrial fibrillation.   -H/O A. fib with RVR.  3.  Amiodarone stopped on 10/4/21.  4.  HCU-hlspyfovc-cvhyfurxn.   -BNP at 8292 on 2/22/2021.  5.  Peripheral edema.  6.  Hiatal hernia-moderate on 2/22/2021.  7.  Left lower lobe nodule on 2/12/2011.  8.  Hypertension-controlled.  9.  Hypokalemia.  -2.9 on 2/13/2021-resolved.  10.  Hypomagnesia on 2/24/2021 at 1.4, normalized.  11.  Prediabetes-controlled.  12.  CKD-3.    Assessment/Plan:    1.    A-fib: Continues to have occasional palpitations.  She has been having  bleeding and she is concerned about her health.  Her palpitations accelerated when she worries about her condition.  She is not thinking about it, she has minimal to no palpitations.  Symptoms appear to be moderately bothersome.  Will continue metoprolol 200 mg tartrate twice a day but increase diltiazem 120 mg once a day to 120 mg twice a day.  This is based on a blood pressure today of 126/64 and heart rate of 81.  A-fib with a heart rate of 74 bpm.  I am hopeful with decreasing her heart rate, her symptoms will improve.  Briefly, we did discuss medications, antiarrhythmics, cardioversion, and ablation.  She is due for a digoxin level today and will be obtained.  2.  Labs today.  Labs to include digoxin, BMP, magnesium, TSH, A1c, lipid, CMP, and CBC.  3.  CHF: Likely diastolic in nature with history of elevated BNP.  Currently on Lasix 40 mg once a day with minimal to no peripheral edema.  Labs suggested today.  3.  Hypertension: Blood pressure continues to be controlled.  Blood pressure today is 126/64.  Will increase diltiazem from 120 mg once a day to 120 mg twice a day.  Continue on Lasix 40 mg once a day, losartan 50 mg daily, and metoprolol 200 mg twice a  day.  4.  CKD-3.  Patient made aware previously.  5.  Follow-up in 6 months or sooner with issues.  Labs obtained today and diltiazem doubled from 120 mg once daily to 120 mg twice a day.        Interval history:  See above.  Having mild/moderate palpitations with history of A-fib.  Increase diltiazem and plan for labs including digoxin level as she is taking digoxin.    HPI:    Mrs. Larkin who has seen a doctor rarely up until being admitted to the hospital on 2/12/2021 for new onset A. fib.  She is presently on digoxin, metoprolol, Coumadin, and amiodarone for atrial fibrillation.  [id number] score is 4.     She also has a history of hypokalemia, hypomagnesia, suspected diastolic dysfunction with peripheral edema with a BNP of 8292, pneumonia on 2/12/2021, goiter on 2/12/2021, moderate hiatal hernia on 2/22/2021, arthritis in the spine, and hypertension.     She had been seen on a yearly basis for regular well check examinations.  On 2/12/2021, she presented to the ER as she had been dyspneic for a few days.  She was found to have new onset A. fib with rate of 120 bpm on her EKG.  She was admitted to the hospital.  She was provided with diltiazem IV twice with improved rates.  She was also placed on metoprolol and started on Xarelto 20 mg daily.  Hydrochlorothiazide and propranolol were discontinued.  She was discharged on metoprolol and diltiazem.     She was seen for 2nd time on 2/22/2021 in the ER.  She had worsening dyspnea and palpitations.  She was found to be in A. fib with RVR with rates in the 130-160's.  Blood pressure was in 130's systolically and saturations in the 90's on room air.  BNP was 8292.  She was given oral diltiazem and IV metoprolol in the ER.  She was then subsequently loaded on digoxin IV and admitted to the ICU.  IV diltiazem was titrated without much effect.  She was started on amiodarone with heart rates in the 90's to 120's.  Echo from 2/22/2021 showed her low normal EF of 50-55%.   Metoprolol 50 mg tartrate twice a day was added.  Her heart rate with activity would increase to the 140-150's.  Ultimately, she was discharged on amiodarone 200 mg twice a day, then 200 mg daily, digoxin 125  g every other day, metoprolol 50 mg tartrate twice a day, and Xarelto 20 mg daily.     Heart rate is better controlled.  But he will increase with activity.  She remains dyspneic with activity but does not complain of any significant palpitations.  Xarelto was not affordable as she is on a limited income and was ultimately converted to Coumadin.     Was also mention that she was noted have a goiter, moderate hiatal hernia, left lower lobe nodule and arthritis in the spine on 2/12/2021.      Relevant testing:  Zio patch in 4/8/2021:  Underlying rhythm was atrial fibrillation.  Hrt rate ranged from 40 bpm, maximum heart rate of 135 bmp, averaging 79 bmp.  No significant bradycardia, 2nd degree Mobitz type II or 3rd degree heart block.  x1 pause lasting 3.0 sec's at 6:12 AM on 4/9/2021  + atrial fibrillation with 100% burden.  x10 triggered events and x9 diary entries.  These corresponded to atrial fibrillation and VE.  x0 runs of VT.  x0 runs of SVT.  Rare, less than 1% of PVC's, ventricular couplets, and ventricular triplets.  0 episodes of ventricular bigeminy.  + episodes of ventricular trigeminy lasting up to 2.6 sec's.    Echocardiogram in 2/22/2021:  No pericardial effusion is present.  Borderline (EF 50-55%) reduced left ventricular function is present.  Diastolic function not assessed due to atrial fibrillation.  The right ventricle is normal size.  Mild left atrial enlargement is present.  Mild to moderate mitral insufficiency is present.  The valve leaflets are not well visualized.  Mild to moderate aortic insufficiency is present.  Mild to moderate tricuspid insufficiency is present.  Trace to mild pulmonic insufficiency is present.  The aorta root is normal.    CTA chest on 2/12/2021:  Right upper  lobe and right middle lobe infiltrate  suspicious for pneumonia.     substernal goiter deviating the trachea from left to right.     11 mm in diameter nodule in the left lower lobe.     No pulmonary emboli.        ICD-10-CM    1. Persistent atrial fibrillation (H)  I48.19 EKG 12-lead complete w/read - (Clinic Performed)            No past medical history on file.    Past Surgical History:   Procedure Laterality Date    APPENDECTOMY      AS REMOVAL OF OVARY/TUBE(S) N/A        Allergies   Allergen Reactions    Aspirin      Heart palpitations       Current Outpatient Medications   Medication Sig Dispense Refill    digoxin (LANOXIN) 125 MCG tablet TAKE 1 TABLET BY MOUTH EVERY OTHER DAY 45 tablet 3    diltiazem ER (TIAZAC) 120 MG 24 hr ER beaded capsule Take 1 capsule (120 mg) by mouth daily 90 capsule 3    furosemide (LASIX) 40 MG tablet Take 1 tablet (40 mg) by mouth daily 90 tablet 3    ipratropium - albuterol 0.5 mg/2.5 mg/3 mL (DUONEB) 0.5-2.5 (3) MG/3ML neb solution Take 3 mLs by nebulization every 4 hours as needed      losartan (COZAAR) 50 MG tablet Take 1 tablet (50 mg) by mouth daily 90 tablet 3    metoprolol tartrate (LOPRESSOR) 100 MG tablet Take 2 tablets by mouth twice daily 360 tablet 3    warfarin ANTICOAGULANT (COUMADIN) 5 MG tablet Take 5 mg by mouth daily Monday, Wednesday, Friday take 1/2 tablet  Tuesday, Thursday, Saturday, Sunday take 1 tablet      albuterol (PROVENTIL) (2.5 MG/3ML) 0.083% neb solution Take 1 vial (2.5 mg) by nebulization every 4 hours as needed for shortness of breath / dyspnea or wheezing (Patient not taking: Reported on 1/3/2024) 90 mL 0    budesonide (PULMICORT) 0.5 MG/2ML neb solution  (Patient not taking: Reported on 1/3/2024)      gentamicin (GARAMYCIN) 0.3 % ophthalmic solution INSTILL 2 DROPS INTO EACH EYE EVERY 4 HOURS AS NEEDED FOR DRY EYES (Patient not taking: Reported on 1/3/2024)      MECLIZINE HCL PO Take 25 mg by mouth as needed for dizziness (Patient not taking:  "Reported on 1/3/2024)         Social History     Socioeconomic History    Marital status:      Spouse name: Not on file    Number of children: Not on file    Years of education: Not on file    Highest education level: Not on file   Occupational History    Not on file   Tobacco Use    Smoking status: Never    Smokeless tobacco: Never   Substance and Sexual Activity    Alcohol use: Not Currently    Drug use: Never    Sexual activity: Not on file   Other Topics Concern    Not on file   Social History Narrative    Not on file     Social Determinants of Health     Financial Resource Strain: Not on file   Food Insecurity: Not on file   Transportation Needs: Not on file   Physical Activity: Not on file   Stress: Not on file   Social Connections: Not on file   Interpersonal Safety: Not on file   Housing Stability: Not on file       LAB RESULTS:   No visits with results within 2 Month(s) from this visit.   Latest known visit with results is:   No results found for any previous visit.        Review of systems: Negative except that which was noted in the HPI.    Physical examination:  /64 (BP Location: Left arm, Cuff Size: Adult Large)   Pulse 81   Ht 1.676 m (5' 6\")   Wt 82.1 kg (181 lb)   SpO2 97%   BMI 29.21 kg/m      GENERAL APPEARANCE: healthy, alert and no distress  CHEST: lungs clear to auscultation - no rales, rhonchi or wheezes, no use of accessory muscles, no retractions, respirations are unlabored, normal respiratory rate  CARDIOVASCULAR: Irregular rate irregular rhythm.  Normal S1 with physiologic split S2, no S3 or S4 and no murmur, click or rub  EXTREMITIES: no clubbing, cyanosis but with mild peripheral edema    Total time spent on day of visit, including review of tests, obtaining/reviewing separately obtained history, ordering medications/tests/procedures, communicating with PCP/consultants, and documenting in electronic medical record: 20 minutes.             Thank you for allowing me to " participate in the care of your patient. Please do not hesitate to contact me if you have any questions.     Abel Nguyen, DO

## 2024-01-29 PROBLEM — Z79.899 ON AMIODARONE THERAPY: Status: RESOLVED | Noted: 2021-03-31 | Resolved: 2024-01-01

## 2024-01-29 PROBLEM — N18.30 STAGE 3 CHRONIC KIDNEY DISEASE (H): Status: ACTIVE | Noted: 2024-01-01

## 2024-01-29 PROBLEM — I48.91 NEW ONSET A-FIB (H): Status: ACTIVE | Noted: 2021-02-12

## 2024-01-29 PROBLEM — R91.1 LEFT LOWER LOBE PULMONARY NODULE: Status: ACTIVE | Noted: 2021-03-31

## 2024-01-29 PROBLEM — Z87.01 HISTORY OF PNEUMONIA: Status: RESOLVED | Noted: 2021-03-31 | Resolved: 2024-01-01

## 2024-01-29 PROBLEM — Z79.899 ON AMIODARONE THERAPY: Status: ACTIVE | Noted: 2021-03-31

## 2024-01-29 PROBLEM — N95.0 POSTMENOPAUSAL BLEEDING: Status: ACTIVE | Noted: 2024-01-01

## 2024-01-29 PROBLEM — Z87.19 H/O HIATAL HERNIA: Status: ACTIVE | Noted: 2021-03-31

## 2024-01-29 PROBLEM — I48.91 ATRIAL FIBRILLATION WITH RAPID VENTRICULAR RESPONSE (H): Status: RESOLVED | Noted: 2021-03-31 | Resolved: 2024-01-01

## 2024-01-29 PROBLEM — E78.2 MIXED HYPERLIPIDEMIA: Status: ACTIVE | Noted: 2024-01-01

## 2024-01-29 PROBLEM — R73.03 PREDIABETES: Status: ACTIVE | Noted: 2024-01-01

## 2024-01-29 PROBLEM — I48.91 ATRIAL FIBRILLATION WITH RAPID VENTRICULAR RESPONSE (H): Status: ACTIVE | Noted: 2021-03-31

## 2024-01-29 PROBLEM — J18.9 PNEUMONIA DUE TO INFECTIOUS ORGANISM, UNSPECIFIED LATERALITY, UNSPECIFIED PART OF LUNG: Status: ACTIVE | Noted: 2021-02-22

## 2024-01-29 PROBLEM — J18.9 PNEUMONIA DUE TO INFECTIOUS ORGANISM, UNSPECIFIED LATERALITY, UNSPECIFIED PART OF LUNG: Status: RESOLVED | Noted: 2021-02-22 | Resolved: 2024-01-01

## 2024-01-29 NOTE — PROGRESS NOTES
"GYN CONSULT NOTE     CHIEF COMPLAINT / REASON FOR VISIT  Patient presents for Gynecology consultation at the request of her primary provider, Dr. Telly Bardales, for postmenopausal bleeding.    HISTORY OF PRESENT ILLNESS  This is the patient's first visit to our clinic.  Kerri Gonsales is a 78 year old  with No LMP recorded. Patient is postmenopausal. postmenopausal female who presents for Gynecology consultation for postmenopausal bleeding.  Menopause was at age 49.  The patient had a 2-week episode of spotting and \"david\" david discharge in November. No menstrual type bleeding or blood clots.  She denies any further spotting or david discharge in the last 8 weeks. No abnormal vaginal discharge, itching, irritation, or malodor. The patient is no longer sexually active. She denies abdominal pain, cramping, or discomfort. The patient was treated for a bladder infection in November and denies any difficulty urinating, dysuria, hematuria, or flank pain now.    The patient has hypertension, atrial fibrillation on anticoagulation, chronic kidney disease stage 3, prediabetes, hyperlipidemia, and substernal goiter.    MENSTRUAL HISTORY  Postmenopausal: Yes.  Menopause was at age 49.  Current menopause symptoms: None.  Hormone replacement therapy: No.  She is not sexually active and denies issues with intercourse.   Pelvic pain: No.  Abnormal Discharge: No.  Vaginitis symptoms: No.  Past GYN history: Left oophorectomy  Previous work-up: No  STD History: No STD history.  Last Pap smear history: Normal.  Mammogram history: Normal.    ALLERGIES     Allergies   Allergen Reactions    Aspirin      Heart palpitations       MEDICATIONS    Current Outpatient Medications:     digoxin (LANOXIN) 125 MCG tablet, TAKE 1 TABLET BY MOUTH EVERY OTHER DAY, Disp: 45 tablet, Rfl: 3    diltiazem ER (TIAZAC) 120 MG 24 hr ER beaded capsule, Take 1 capsule (120 mg) by mouth every 12 hours, Disp: 180 capsule, Rfl: 3    furosemide (LASIX) 40 MG " tablet, Take 1 tablet (40 mg) by mouth daily, Disp: 90 tablet, Rfl: 3    ipratropium - albuterol 0.5 mg/2.5 mg/3 mL (DUONEB) 0.5-2.5 (3) MG/3ML neb solution, Take 3 mLs by nebulization every 4 hours as needed, Disp: , Rfl:     losartan (COZAAR) 50 MG tablet, Take 1 tablet (50 mg) by mouth daily, Disp: 90 tablet, Rfl: 3    magnesium oxide (MAG-OX) 400 MG tablet, Take 1 tablet (400 mg) by mouth daily, Disp: 90 tablet, Rfl: 0    metoprolol tartrate (LOPRESSOR) 100 MG tablet, Take 2 tablets by mouth twice daily, Disp: 360 tablet, Rfl: 3    warfarin ANTICOAGULANT (COUMADIN) 5 MG tablet, Take 5 mg by mouth daily Monday, Wednesday, Friday take 1 tablet Tuesday, Thursday, Saturday, Sunday take 1/2 tablet, Disp: , Rfl:     albuterol (PROVENTIL) (2.5 MG/3ML) 0.083% neb solution, Take 1 vial (2.5 mg) by nebulization every 4 hours as needed for shortness of breath / dyspnea or wheezing (Patient not taking: Reported on 1/3/2024), Disp: 90 mL, Rfl: 0    budesonide (PULMICORT) 0.5 MG/2ML neb solution, , Disp: , Rfl:     gentamicin (GARAMYCIN) 0.3 % ophthalmic solution, INSTILL 2 DROPS INTO EACH EYE EVERY 4 HOURS AS NEEDED FOR DRY EYES (Patient not taking: Reported on 1/3/2024), Disp: , Rfl:     MECLIZINE HCL PO, Take 25 mg by mouth as needed for dizziness (Patient not taking: Reported on 1/3/2024), Disp: , Rfl:     REVIEW OF SYSTEMS  As per the HPI, otherwise negative.    Past Medical History:   Diagnosis Date    Atrial fibrillation with RVR (H) 02/22/2021    Benign essential hypertension 02/12/2021    Chronic diastolic heart failure (H) 02/12/2021    Elevated brain natriuretic peptide (BNP) level 03/31/2021    Goiter 03/31/2021    H/O hiatal hernia-moderate 03/31/2021    Left lower lobe pulmonary nodule 03/31/2021    Lower extremity edema 03/31/2021    Mixed hyperlipidemia 01/29/2024    New onset a-fib (H) 02/12/2021    On Coumadin for atrial fibrillation (H) 03/31/2021    Osteoarthritis of spine 03/31/2021    Persistent atrial  "fibrillation (H) 2021    Pneumonia due to infectious organism, unspecified laterality, unspecified part of lung 2021    Postmenopausal bleeding 2024    Prediabetes 2024    Simple chronic bronchitis (H) 2021    Stage 3 chronic kidney disease (H) 2024       Past Surgical History:   Procedure Laterality Date    APPENDECTOMY      AS REMOVAL OF OVARY/TUBE(S) Left        OB History    Para Term  AB Living   1 1 1 0 0 1   SAB IAB Ectopic Multiple Live Births   0 0 0 0 1      # Outcome Date GA Lbr Danie/2nd Weight Sex Delivery Anes PTL Lv   1 Term 1963     Vag-Spont   JERALD       Social History     Tobacco Use    Smoking status: Never    Smokeless tobacco: Never   Substance Use Topics    Alcohol use: Not Currently     History   Sexual Activity    Sexual activity: Not Currently    Partners: Male       No family history on file.    OBJECTIVE  BP (!) 148/78   Pulse 70   Resp 16   Ht 1.676 m (5' 6\")   Wt 82.1 kg (181 lb)   BMI 29.21 kg/m      General:  Well-developed, well-nourished female in no apparent distress.  Neurological: Alert and oriented x3.  Lungs:  Clear to auscultation bilaterally with good inspiratory effort.  No wheezing rhonchi or rales noted. Breathing nonlabored.  Heart:  Regular rate and rhythm without murmur. No JVD.  No peripheral vascular disease.  Abdomen: Soft, nontender, nondistended, positive bowel sounds.  No organomegaly. No rebound, no guarding.  Pelvic exam: Declined.  Rectal exam: Declined.  Extremities:  No clubbing cyanosis or edema. Nontender bilaterally.    DIAGNOSTICS  2024 Cr 0.82  2024 TSH 0.64  2024 Hgb 13.0, hematocrit 38.0, Plt 236    2023 Pelvic ultrasound: A normal size uterus and smooth outer contour of the uterus.  Uterus measures 8.9 x 5.3 x 5.1 cm.  No uterine fibroid.  The endometrium is diffusely thickened and heterogeneous measuring 5.1 cm.  The ovaries are not visualized.  No suspicious fluid collections " in the cul-de-sac.  IMPRESSION:  Diffusely thickened and heterogeneous endometrium measuring 5.1 cm    ASSESSMENT / PLAN  Kerri Gonsales is a 78 year old  female who presents in consultation for postmenopausal bleeding.    1 Postmenopausal bleeding  2 Thickened endometrium  3 Medical problems including hypertension, atrial fibrillation on anticoagulation, chronic kidney disease stage 3, prediabetes, hyperlipidemia, and substernal goiter    - I discussed postmenopausal bleeding with the patient.  - The patient's pelvic ultrasound shows uterus measuring 8.9 x 5.3 x 5.1 cm without myometrial masses or fibroids.  The endometrium is thickened at 5.1 cm.  No fluid collection in the cul-de-sac.  No ovaries visualized.  - The patient declines pelvic examination.  - The patient reports that her spotting and bleeding symptoms have resolved.  She has had no postmenopausal bleeding in the last 8 weeks.  - I reviewed differential diagnosis with the patient.  I discussed some of the causes of postmenopausal uterine bleeding including systemic causes such as thyroid abnormalities, vaginal and cervical abnormalities such as atrophy, cervical or vaginal dysplasia and cervical polyp, and uterine abnormalities such as endometrial polyp, fibroid, endometrial hyperplasia, carcinoma.  - I am concerned about the thickened endometrium at 5.1 cm.  I am concerned for possible endometrial hyperplasia, endometrial polyp, and even endometrial carcinoma.  I recommend sampling the endometrium.  - I discussed the option endometrial biopsy as well as saline infusion sonography.  I also discussed the options of hysteroscopy with biopsy.  - The patient again declines pelvic exam.  She also declines endometrial biopsy in clinic.  - The patient would like conservative observation.  - At this point I recommend repeat pelvic ultrasound in the next 4 to 6 weeks to reassess endometrium.  If the endometrium continues to be thickened then I strongly  recommend endometrial sampling.  - The patient agrees to proceed with repeat pelvic ultrasound.  Ordered.  Radiology at [facility] will contact patient directly to schedule.  - I briefly discussed management options with the patient including observation, hormone therapy and surgical options.  I recommend sampling the endometrium before proceeding with hormone therapy.  I did discuss the option of hysteroscopy with dilation curettage to remove the endometrial lining and the patient declines at this time.  - Given the patient's multiple comorbidities, if the patient does desire to proceed with hysteroscopy and dilation curettage then I recommend that this be performed at a tertiary care center..  - The patient asked appropriate questions and these were answered for her.  - Bleeding precautions are reviewed with the patient.    - Problem list reviewed and updated.  - Follow up in 2 weeks to review pelvic ultrasound results discussed management plan.    Telly Casper MD  Obstetrics and Gynecology      CC: PRIMARY CARE PROVIDER: Telly Zarco

## 2024-03-27 NOTE — TELEPHONE ENCOUNTER
[id number] sent Rx request for the following:      Requested Prescriptions   Pending Prescriptions Disp Refills    MAGNESIUM-OXIDE 400 (240 Mg) MG tablet [Pharmacy Med Name: MAGnesium-Oxide 400 (241.3 Mg) MG Oral Tablet] 90 tablet 0     Sig: Take 1 tablet by mouth once daily       There is no refill protocol information for this order          Last Prescription Date:   1/3/24  Last Fill Qty/Refills:         90, R-0    Last Office Visit:              1/3/24   Future Office visit:           7/15/24    Routing refill request to provider for review/approval because:  Drug not on the [facility] refill protocol.    KEYLA BAEZ RN on 3/27/2024 at 10:55 AM

## 2024-03-29 NOTE — ED NOTES
Face to face report given with opportunity to observe patient.    Report given to Jeanna Ochoa RN   3/29/2024  9:18 AM

## 2024-03-29 NOTE — ED TRIAGE NOTES
Pt arrives with c/o shortness of breathe, coughing up blood and irregular HR. Pt does have history of afib. Pt states started around 1am while trying to sleep. Pt does have 2+ pitting edema to BLE. Pt denies any c/o chest pain. Does have dyspnea with exertion. Sats in triage on room air were 86-88%. Pt states she also had some nose bleeds but states the coughing up blood started before the nose bleeds.      Triage Assessment (Adult)       Row Name 03/29/24 0305          Triage Assessment    Airway WDL WDL     Additional Documentation Breath Sounds (Group)        Respiratory WDL    Respiratory WDL X;rhythm/pattern;expansion/retractions;mucous membranes;cough;all     Rhythm/Pattern, Respiratory shortness of breath;dyspnea upon exertion     Mucous Membranes pink;moist     Cough Frequency frequent     Cough Type productive        Breath Sounds    Breath Sounds All Fields     All Lung Fields Breath Sounds crackles, fine        Skin Circulation/Temperature WDL    Skin Circulation/Temperature WDL WDL        Cardiac WDL    Cardiac WDL X        Peripheral/Neurovascular WDL    Peripheral Neurovascular WDL X;all        Cognitive/Neuro/Behavioral WDL    Cognitive/Neuro/Behavioral WDL WDL        [place] Coma Scale    Best Eye Response 4-->(E4) spontaneous     Best Motor Response 6-->(M6) obeys commands     Best Verbal Response 5-->(V5) oriented     [place] Coma Scale Score 15        LUE Neurovascular Assessment    Temperature LUE warm     Color LUE no discoloration     Sensation LUE no tenderness;no tingling;no numbness        RUE Neurovascular Assessment    Temperature RUE warm     Color RUE no discoloration     Sensation RUE no tenderness;no numbness;no tingling        LLE Neurovascular Assessment    Temperature LLE warm     Color LLE no discoloration     Sensation LLE no tingling;no tenderness;no numbness        RLE Neurovascular Assessment    Temperature RLE warm     Color RLE no discoloration     Sensation RLE no  tingling;no tenderness;no numbness

## 2024-03-29 NOTE — ED NOTES
DATE/TIME OF CALL RECEIVED FROM LAB:  03/29/24 at 3:32 AM   LAB TEST:  Lactic  LAB VALUE:  2.3  PROVIDER NOTIFIED?: Yes  PROVIDER NAME: Aysha  DATE/TIME LAB VALUE REPORTED TO PROVIDER: 3/29/24 @ 0332  MECHANISM OF PROVIDER NOTIFICATION: Face-To-Face  PROVIDER RESPONSE: OK

## 2024-03-29 NOTE — ED PROVIDER NOTES
History     Chief Complaint   Patient presents with    Cough    Hemoptysis     The history is provided by the patient.   Cough  Cough characteristics:  Productive  Sputum characteristics:  Bloody  Severity:  Moderate  Onset quality:  Sudden  Duration:  3 hours  Timing:  Intermittent  Progression:  Unchanged  Chronicity:  New  Associated symptoms: shortness of breath and wheezing    Associated symptoms: no chest pain, no chills, no diaphoresis, no fever, no headaches, no myalgias and no rash        Allergies:  Allergies   Allergen Reactions    Panmycin [Tetracycline] Unknown     Listed at [facility].     Aspirin Palpitations     Heart palpitations-with high doses.        Problem List:    Patient Active Problem List    Diagnosis Date Noted    Stage 3 chronic kidney disease (H) 01/29/2024     Priority: Medium    Postmenopausal bleeding 01/29/2024     Priority: Medium    Prediabetes 01/29/2024     Priority: Medium    Mixed hyperlipidemia 01/29/2024     Priority: Medium    Encounter for monitoring digoxin therapy 06/26/2023     Priority: Medium    Persistent atrial fibrillation (H) 03/31/2021     Priority: Medium    Elevated brain natriuretic peptide (BNP) level 03/31/2021     Priority: Medium    Goiter 03/31/2021     Priority: Medium    H/O hiatal hernia-moderate 03/31/2021     Priority: Medium    Left lower lobe pulmonary nodule 03/31/2021     Priority: Medium    Osteoarthritis of spine 03/31/2021     Priority: Medium    Lower extremity edema 03/31/2021     Priority: Medium    On Coumadin for atrial fibrillation (H) 03/31/2021     Priority: Medium    Atrial fibrillation with RVR (H) 02/22/2021     Priority: Medium    Hypokalemia 02/12/2021     Priority: Medium    Hypomagnesemia 02/12/2021     Priority: Medium    New onset a-fib (H) 02/12/2021     Priority: Medium    Chronic diastolic heart failure (H) 02/12/2021     Priority: Medium    Simple chronic bronchitis (H) 02/12/2021     Priority: Medium    Benign essential  hypertension 02/12/2021     Priority: Medium        Past Medical History:    Past Medical History:   Diagnosis Date    Atrial fibrillation with RVR (H) 02/22/2021    Benign essential hypertension 02/12/2021    Chronic diastolic heart failure (H) 02/12/2021    Elevated brain natriuretic peptide (BNP) level 03/31/2021    Goiter 03/31/2021    H/O hiatal hernia-moderate 03/31/2021    Left lower lobe pulmonary nodule 03/31/2021    Lower extremity edema 03/31/2021    Mixed hyperlipidemia 01/29/2024    New onset a-fib (H) 02/12/2021    On Coumadin for atrial fibrillation (H) 03/31/2021    Osteoarthritis of spine 03/31/2021    Persistent atrial fibrillation (H) 03/31/2021    Pneumonia due to infectious organism, unspecified laterality, unspecified part of lung 02/22/2021    Postmenopausal bleeding 01/29/2024    Prediabetes 01/29/2024    Simple chronic bronchitis (H) 02/12/2021    Stage 3 chronic kidney disease (H) 01/29/2024       Past Surgical History:    Past Surgical History:   Procedure Laterality Date    APPENDECTOMY      AS REMOVAL OF OVARY/TUBE(S) Left        Family History:    History reviewed. No pertinent family history.    Social History:  Marital Status:   [4]  Social History     Tobacco Use    Smoking status: Never    Smokeless tobacco: Never   Substance Use Topics    Alcohol use: Not Currently    Drug use: Never        Medications:    albuterol (PROAIR HFA/PROVENTIL HFA/VENTOLIN HFA) 108 (90 Base) MCG/ACT inhaler  budesonide (PULMICORT) 0.5 MG/2ML neb solution  cetirizine (ZYRTEC) 10 MG tablet  digoxin (LANOXIN) 125 MCG tablet  diltiazem ER (TIAZAC) 120 MG 24 hr ER beaded capsule  furosemide (LASIX) 40 MG tablet  gentamicin (GARAMYCIN) 0.3 % ophthalmic solution  ipratropium - albuterol 0.5 mg/2.5 mg/3 mL (DUONEB) 0.5-2.5 (3) MG/3ML neb solution  losartan (COZAAR) 50 MG tablet  magnesium oxide 400 MG tablet  meclizine (ANTIVERT) 25 MG tablet  metoprolol tartrate (LOPRESSOR) 100 MG tablet  warfarin  "ANTICOAGULANT (COUMADIN) 5 MG tablet          Review of Systems   Constitutional:  Negative for chills, diaphoresis and fever.   HENT:  Negative for voice change.    Eyes:  Negative for visual disturbance.   Respiratory:  Positive for cough, shortness of breath and wheezing. Negative for chest tightness and stridor.    Cardiovascular:  Negative for chest pain, palpitations and leg swelling.   Gastrointestinal:  Negative for abdominal distention, abdominal pain, anal bleeding, blood in stool, nausea and vomiting.   Genitourinary:  Negative for decreased urine volume, dysuria, flank pain and hematuria.   Musculoskeletal:  Negative for arthralgias, back pain, gait problem, myalgias, neck pain and neck stiffness.   Skin:  Negative for color change, pallor, rash and wound.   Neurological:  Negative for dizziness, syncope, light-headedness, numbness and headaches.   Psychiatric/Behavioral:  Negative for confusion and suicidal ideas.        Physical Exam   BP: (!) 197/73  Pulse: 80  Temp: 98.8  F (37.1  C)  Resp: 20  Height: 170.2 cm (5' 7\")  Weight: 77.1 kg (170 lb)  SpO2: (!) 88 %      Physical Exam  Vitals and nursing note reviewed.   Constitutional:       Appearance: She is well-developed.   HENT:      Head: Normocephalic and atraumatic.      Mouth/Throat:      Pharynx: No oropharyngeal exudate.   Eyes:      Conjunctiva/sclera: Conjunctivae normal.      Pupils: Pupils are equal, round, and reactive to light.   Neck:      Thyroid: No thyromegaly.      Vascular: No JVD.      Trachea: No tracheal deviation.   Cardiovascular:      Rate and Rhythm: Normal rate and regular rhythm.      Heart sounds: Normal heart sounds. No murmur heard.     No friction rub. No gallop.   Pulmonary:      Effort: Pulmonary effort is normal. Tachypnea present. No accessory muscle usage or respiratory distress.      Breath sounds: No stridor. Examination of the right-upper field reveals wheezing. Examination of the left-upper field reveals " wheezing. Examination of the right-middle field reveals wheezing. Examination of the left-middle field reveals wheezing. Examination of the right-lower field reveals decreased breath sounds and wheezing. Examination of the left-lower field reveals wheezing. Decreased breath sounds and wheezing present. No rales.   Chest:      Chest wall: No tenderness.   Abdominal:      General: Bowel sounds are normal. There is no distension.      Palpations: Abdomen is soft. There is no mass.      Tenderness: There is no abdominal tenderness. There is no guarding or rebound.   Musculoskeletal:         General: No tenderness. Normal range of motion.      Cervical back: Normal range of motion and neck supple.   Lymphadenopathy:      Cervical: No cervical adenopathy.   Skin:     General: Skin is warm and dry.      Coloration: Skin is not pale.      Findings: No erythema or rash.   Neurological:      Mental Status: She is alert and oriented to person, place, and time.   Psychiatric:         Behavior: Behavior normal.         ED Course     ED Course as of 04/01/24 1945   Fri Mar 29, 2024   0702 SOR 78F with hypoxia, COPD, hemoptysis. CT chest shows cancer mets in the lungs. Oxygen improved with nebulizers and steroids.   0745 CT Chest w Contrast   Findings most consistent with metastatic malignant neoplasm with numerous nodules/masses throughout the parenchyma both lungs as well as mediastinal and hilar lymphadenopathy.     Interval enlargement of a 6.3 cm mass involving the left lobe of thyroid gland.     New 1.7 x 1.2 cm right adrenal gland nodule, also concerning for metastatic disease.     0746 Dr. Mita faria [facility]: transfer for bronch, 5mg IV vit K now     Procedures           No results found for this or any previous visit (from the past 24 hour(s)).      Medications   ipratropium - albuterol 0.5 mg/2.5 mg/3 mL (DUONEB) neb solution 3 mL (3 mLs Nebulization $Given 3/29/24 1401)   methylPREDNISolone sodium succinate (solu-MEDROL)  injection 125 mg (125 mg Intravenous $Given 3/29/24 0331)   sodium chloride (PF) 0.9% PF flush 50 mL (50 mLs Intravenous $Given 3/29/24 0433)   iopamidol (ISOVUE-370) solution 71 mL (71 mLs Intravenous $Given 3/29/24 0433)   ipratropium - albuterol 0.5 mg/2.5 mg/3 mL (DUONEB) neb solution 3 mL (3 mLs Nebulization $Given 3/29/24 0595)   LORazepam (ATIVAN) injection 0.5 mg (0.5 mg Intravenous $Given 3/29/24 4406)   phytonadione 5 mg in sodium chloride 0.9 % 50 mL intermittent infusion (0 mg Intravenous Stopped 3/29/24 0917)       Assessments & Plan (with Medical Decision Making)   Coughed blood multiple times at home, SOB  Hx of COPD,  hypoxic in ER, spo2 88 on RA,   After placing 4lit o2 spo2 improved to 92, felt comfortable      Labs reviewed  CXR: large mass like opacity in right middle lobe    CT chest pending  S/o to Dr rosenbaum at the change of shift      I have reviewed the nursing notes.    I have reviewed the findings, diagnosis, plan and need for follow up with the patient.        Discharge Medication List as of 3/29/2024  1:19 PM          Final diagnoses:   Hemoptysis   Malignant neoplasm metastatic to both lungs (H)       3/29/2024   [facility]       Brian Olmstead MD  04/01/24 1945

## 2024-03-29 NOTE — MEDICATION SCRIBE - ADMISSION MEDICATION HISTORY
Medication Scribe Admission Medication History    Admission medication history is complete. The information provided in this note is only as accurate as the sources available at the time of the update.    Information Source(s): Patient and CareEverywhere/SureScripts via in-person    Coumadin information:  INR date: 3/27/24  INR result:  2.8  Next INR date: 24  Range: 2.0-3.0  Indication: a-fib    Coumadin strength/instructions: 2.5 mg M, 5 mg ROW  Tablet strength: 5 mg    Time of day patient takes coumadin at home: (evening) 4:00 PM  Exact last dose/time patient took PTA: 5 mg 3/28/24 4:00 PM    Patient's coumadin clinic facility and contact: [facility] ([place]) [phone]  Fax number for discharge instructions (if applicable): [phone]    Pertinent Information:   Patient manages her own medications and has a handwritten medication list with her.   Duonebs- prescribed PRN-pt takes BID scheduled  Budesonide- prescribed scheduled- pt takes PRN  Lasix- takes around outings due to frequent urination so doesn't always take at the same time.     Changes made to PTA medication list:  Added: albuterol emergency inhaler-possibly . zyrtec  Deleted: albuterol nebs  Changed: patient takes her medications at very specific times through out the day to split them up. Updated in PTA meds to reflect how she takes at home.    Allergies reviewed with patient and updates made in EHR: yes    Medication History Completed By: Marisol Zavala 3/29/2024 12:25 PM    PTA Med List   Medication Sig Last Dose    albuterol (PROAIR HFA/PROVENTIL HFA/VENTOLIN HFA) 108 (90 Base) MCG/ACT inhaler Inhale 2 puffs into the lungs every 4 hours as needed for shortness of breath, wheezing or cough (every 4-6 hours) Unknown    budesonide (PULMICORT) 0.5 MG/2ML neb solution Take 0.5 mg by nebulization 2 times daily as needed (shortness of breath/ wheezing) Unknown    cetirizine (ZYRTEC) 10 MG tablet Take 10 mg by mouth daily as needed  for allergies More than a month    digoxin (LANOXIN) 125 MCG tablet Take 125 mcg by mouth every other day - morning. 3/28/2024 at 0800    diltiazem ER (TIAZAC) 120 MG 24 hr ER beaded capsule Take 120 mg by mouth every 12 hours (8:00 AM/8:30 PM) 3/28/2024 at 2030    furosemide (LASIX) 40 MG tablet Take 1 tablet (40 mg) by mouth daily 3/28/2024 at 1000    gentamicin (GARAMYCIN) 0.3 % ophthalmic solution Place 2 drops into both eyes every 4 hours as needed (dry eyes) 3/28/2024 at PRN    ipratropium - albuterol 0.5 mg/2.5 mg/3 mL (DUONEB) 0.5-2.5 (3) MG/3ML neb solution Take 3 mLs by nebulization 2 times daily -and every 4 hours as needed. 3/28/2024    losartan (COZAAR) 50 MG tablet Take 50 mg by mouth at bedtime -8:30 PM. 3/28/2024 at 2030    magnesium oxide 400 MG tablet Take 400 mg by mouth daily -midnight. 3/29/2024 at 0000    meclizine (ANTIVERT) 25 MG tablet Take 25 mg by mouth as needed for dizziness PRN at PRN    metoprolol tartrate (LOPRESSOR) 100 MG tablet Take 200 mg by mouth 2 times daily - morning and midnight. 3/29/2024 at 0000    warfarin ANTICOAGULANT (COUMADIN) 5 MG tablet Take 1/2 tablet (2.5 mg) by mouth once daily in the evening on Monday. Take 1 tablet (5 mg) all other days or as directed by [facility] anticoagulation. Patient takes at 4:00 PM. 3/28/2024 at 1600

## 2024-04-12 NOTE — ED NOTES
Patient FACE Sheet, ECG, PCS, EMTALA, and ED Transfer Summary faxed to [facility]. Fax marked successful.

## 2024-04-12 NOTE — ED NOTES
Patient ambulated from room, 100ft, then 100ft back, to the restroom, and to bed. Writer checked patient vitals, SpO2 at 89% and HR at 84 after ambulation.

## 2024-04-12 NOTE — ED PROVIDER NOTES
History     Chief Complaint   Patient presents with    Shortness of Breath     HPI  Kerri Gonsales is a 78 year old female who presents to the emergency department with concerns of worsening shortness of breath.  Patient states that today she has had increased work of breathing.  Therefore, she came to the emergency department for further evaluation.  Patient denies any chest pain or fevers with this.  Only shortness of breath and cough.  She did volunteer that when she was recently hospitalized in [place] the person next-door had a frequent cough and wonders if she might of caught something from him.  No abdominal pain or changes to bowel/bladder.    Allergies:  Allergies   Allergen Reactions    Panmycin [Tetracycline] Unknown     Listed at [facility].     Aspirin Palpitations     Heart palpitations-with high doses.        Problem List:    Patient Active Problem List    Diagnosis Date Noted    Stage 3 chronic kidney disease (H) 01/29/2024     Priority: Medium    Postmenopausal bleeding 01/29/2024     Priority: Medium    Prediabetes 01/29/2024     Priority: Medium    Mixed hyperlipidemia 01/29/2024     Priority: Medium    Encounter for monitoring digoxin therapy 06/26/2023     Priority: Medium    Persistent atrial fibrillation (H) 03/31/2021     Priority: Medium    Elevated brain natriuretic peptide (BNP) level 03/31/2021     Priority: Medium    Goiter 03/31/2021     Priority: Medium    H/O hiatal hernia-moderate 03/31/2021     Priority: Medium    Left lower lobe pulmonary nodule 03/31/2021     Priority: Medium    Osteoarthritis of spine 03/31/2021     Priority: Medium    Lower extremity edema 03/31/2021     Priority: Medium    On Coumadin for atrial fibrillation (H) 03/31/2021     Priority: Medium    Atrial fibrillation with RVR (H) 02/22/2021     Priority: Medium    Hypokalemia 02/12/2021     Priority: Medium    Hypomagnesemia 02/12/2021     Priority: Medium    New onset a-fib (H) 02/12/2021     Priority: Medium     Chronic diastolic heart failure (H) 02/12/2021     Priority: Medium    Simple chronic bronchitis (H) 02/12/2021     Priority: Medium    Benign essential hypertension 02/12/2021     Priority: Medium        Past Medical History:    Past Medical History:   Diagnosis Date    Atrial fibrillation with RVR (H) 02/22/2021    Benign essential hypertension 02/12/2021    Chronic diastolic heart failure (H) 02/12/2021    Elevated brain natriuretic peptide (BNP) level 03/31/2021    Goiter 03/31/2021    H/O hiatal hernia-moderate 03/31/2021    Left lower lobe pulmonary nodule 03/31/2021    Lower extremity edema 03/31/2021    Mixed hyperlipidemia 01/29/2024    New onset a-fib (H) 02/12/2021    On Coumadin for atrial fibrillation (H) 03/31/2021    Osteoarthritis of spine 03/31/2021    Persistent atrial fibrillation (H) 03/31/2021    Pneumonia due to infectious organism, unspecified laterality, unspecified part of lung 02/22/2021    Postmenopausal bleeding 01/29/2024    Prediabetes 01/29/2024    Simple chronic bronchitis (H) 02/12/2021    Stage 3 chronic kidney disease (H) 01/29/2024       Past Surgical History:    Past Surgical History:   Procedure Laterality Date    APPENDECTOMY      AS REMOVAL OF OVARY/TUBE(S) Left        Family History:    No family history on file.    Social History:  Marital Status:   [4]  Social History     Tobacco Use    Smoking status: Never    Smokeless tobacco: Never   Substance Use Topics    Alcohol use: Not Currently    Drug use: Never        Medications:    albuterol (PROAIR HFA/PROVENTIL HFA/VENTOLIN HFA) 108 (90 Base) MCG/ACT inhaler  budesonide (PULMICORT) 0.5 MG/2ML neb solution  cetirizine (ZYRTEC) 10 MG tablet  digoxin (LANOXIN) 125 MCG tablet  diltiazem ER (TIAZAC) 120 MG 24 hr ER beaded capsule  furosemide (LASIX) 40 MG tablet  gentamicin (GARAMYCIN) 0.3 % ophthalmic solution  ipratropium - albuterol 0.5 mg/2.5 mg/3 mL (DUONEB) 0.5-2.5 (3) MG/3ML neb solution  losartan (COZAAR) 50 MG  tablet  magnesium oxide 400 MG tablet  meclizine (ANTIVERT) 25 MG tablet  metoprolol tartrate (LOPRESSOR) 100 MG tablet  warfarin ANTICOAGULANT (COUMADIN) 5 MG tablet          Review of Systems  SEE HPI  Physical Exam   BP: 166/78  Pulse: 91  Temp: 99.4  F (37.4  C)  Resp: 20  Weight: 78.2 kg (172 lb 6.4 oz)  SpO2: (!) 85 %      Physical Exam  Constitutional: Alert and conversant. NAD  HENT: Atraumatic  Eyes: Normal pupils   Neck: Normal ROM  CV: Normal rate, regular rhythm, no murmur   Pulmonary/Chest: Mildly labored respirations, coarse bilaterally.   Abdominal: Soft, non-tender, non-distended   MSK: ENCISO.   Neuro: Alert and appropriate. Cnx, Strength, and Sensation grossly intact  Skin: Warm and dry. No diaphoresis. No rashes on exposed skin    Psych: Appropriate mood and affect     ED Course     ED Course as of 04/12/24 1555   Fri Apr 12, 2024   1030 Impression and Plan: Patient presents with concern of worsening shortness of breath.  Vitals reviewed, hypoxic to 85%, but otherwise within normal limits.  Patient with exam in which she is in no distress, however, she does have some increased work of breathing.  Answering questions appropriately.  Medical history most notable for metastatic cancer.  Plan at this time will be to further evaluate the patient with chest x-ray as well as extensive laboratory evaluation.  May ultimately get a CT scan as well pending on dimer level.  Final plan pending results.   1122 Cbc wnl.    1144 Mildly elevated proBNP.  Appears consistent with prior.   1144 Troponin mildly elevated.  However, in the intermediate range.  Will obtain delta troponin.   1144 Influenza, covid, rsv negative. CBC wnl.    1144 BMP without any marked abnormality.    1157 IMPRESSION:       Multiple masses in the lungs compatible with metastatic disease.     Mild streaky left basilar opacity is likely atelectasis or scarring.       ALBERTO ROSE MD      1157 CBC wnl.    1325 IMPRESSION:     No acute  pulmonary emboli to the subsegmental level.     Minimal progression of diffuse pulmonary, mediastinal and hilar masses  when compared to the prior dated 3/29/2024.     1354 Repeat troponin without any concerning findings.   1432 Patient continues to be hypoxic without supplemental oxygen.  Patient persistently in the mid 80s.  She responds well to 1 L and goes into the low 90s and is not symptomatic.  However, with this new oxygen requirement I believe the patient requires admission.  [facility] paged for admission.   1554 Patient discussed with Dr. Cunningham who accepted the patient for transfer to [facility].      Procedures           Results for orders placed or performed during the hospital encounter of 04/12/24 (from the past 24 hour(s))   EKG 12-lead, tracing only   Result Value Ref Range    Systolic Blood Pressure  mmHg    Diastolic Blood Pressure  mmHg    Ventricular Rate 90 BPM    Atrial Rate  BPM    MN Interval  ms    QRS Duration 72 ms     ms    QTc 415 ms    P Axis  degrees    R AXIS 16 degrees    T Axis 163 degrees    Interpretation ECG       Atrial fibrillation  ST & T wave abnormality, consider lateral ischemia  Abnormal ECG  When compared with ECG of 29-MAR-2024 03:14,  T wave inversion no longer evident in Anterior leads  T wave inversion more evident in Lateral leads  Confirmed by MD Edison, Jewel ([id number]) on 4/12/2024 2:23:26 PM     Symptomatic Influenza A/B, RSV, & SARS-CoV2 PCR (COVID-19) Nasopharyngeal    Specimen: Nasopharyngeal; Swab   Result Value Ref Range    Influenza A PCR Negative Negative    Influenza B PCR Negative Negative    RSV PCR Negative Negative    SARS CoV2 PCR Negative Negative    Narrative    Testing was performed using the Xpert Xpress CoV2/Flu/RSV Assay on the [employer] GeneXpert Instrument. This test should be ordered for the detection of SARS-CoV-2, influenza, and RSV viruses in individuals who meet clinical and/or epidemiological criteria. Test  performance is unknown in asymptomatic patients. This test is for in vitro diagnostic use under the FDA EUA for laboratories certified under CLIA to perform high or moderate complexity testing. This test has not been FDA cleared or approved. A negative result does not rule out the presence of PCR inhibitors in the specimen or target RNA in concentration below the limit of detection for the assay. If only one viral target is positive but coinfection with multiple targets is suspected, the sample should be re-tested with another FDA cleared, approved, or authorized test, if coinfection would change clinical management. This test was validated by the [facility]. These laboratories are certified under the Clinical Laboratory Improvement Amendments of 1988 (CLIA-88) as qualified to perform high complexity laboratory testing.   CBC with platelets differential    Narrative    The following orders were created for panel order CBC with platelets differential.  Procedure                               Abnormality         Status                     ---------                               -----------         ------                     CBC with platelets and d...[[id number]]                      Final result                 Please view results for these tests on the individual orders.   Basic metabolic panel   Result Value Ref Range    Sodium 133 (L) 135 - 145 mmol/L    Potassium 4.0 3.4 - 5.3 mmol/L    Chloride 91 (L) 98 - 107 mmol/L    Carbon Dioxide (CO2) 30 (H) 22 - 29 mmol/L    Anion Gap 12 7 - 15 mmol/L    Urea Nitrogen 18.3 8.0 - 23.0 mg/dL    Creatinine 0.95 0.51 - 0.95 mg/dL    GFR Estimate 60 (L) >60 mL/min/1.73m2    Calcium 9.3 8.8 - 10.2 mg/dL    Glucose 230 (H) 70 - 99 mg/dL   Troponin T, High Sensitivity   Result Value Ref Range    Troponin T, High Sensitivity 19 (H) <=14 ng/L   D dimer quantitative   Result Value Ref Range    D-Dimer Quantitative 1.15 (H) 0.00 - 0.50 ug/mL FEU    Narrative     This D-dimer assay is intended for use in conjunction with a clinical pretest probability assessment model to exclude pulmonary embolism (PE) and deep venous thrombosis (DVT) in outpatients suspected of PE or DVT. The cut-off value is 0.50 ug/mL FEU.    For patients 50 years of age or older, the application of age-adjusted cut-off values for D-Dimer may increase the specificity without significant effect on sensitivity. The literature suggested calculation age adjusted cut-off in ug/L = age in years x 10 ug/L. The results in this laboratory are reported as ug/mL rather than ug/L. The calculation for age adjusted cut off in ug/mL= age in years x 0.01 ug/mL. For example, the cut off for a 76 year old male is 76 x 0.01 ug/mL = 0.76 ug/mL (760 ug/L).    M Jesus et al. Age adjusted D-dimer cut-off levels to rule out pulmonary embolism: The ADJUST-PE Study. JUAQUIN 2014;311:4412-8297.; HJ Pearl et al. Diagnostic accuracy of conventional or age adjusted D-dimer cutoff values in older patients with suspected venous thromboembolism. Systemic review and meta-analysis. BMJ 2013:346:f2492.   NT pro BNP   Result Value Ref Range    N terminal Pro BNP Inpatient 2,071 (H) 0 - 1,800 pg/mL   CBC with platelets and differential   Result Value Ref Range    WBC Count 6.8 4.0 - 11.0 10e3/uL    RBC Count 3.98 3.80 - 5.20 10e6/uL    Hemoglobin 12.5 11.7 - 15.7 g/dL    Hematocrit 37.2 35.0 - 47.0 %    MCV 94 78 - 100 fL    MCH 31.4 26.5 - 33.0 pg    MCHC 33.6 31.5 - 36.5 g/dL    RDW 13.7 10.0 - 15.0 %    Platelet Count 194 150 - 450 10e3/uL    % Neutrophils 78 %    % Lymphocytes 14 %    % Monocytes 7 %    % Eosinophils 1 %    % Basophils 0 %    % Immature Granulocytes 0 %    NRBCs per 100 WBC 0 <1 /100    Absolute Neutrophils 5.3 1.6 - 8.3 10e3/uL    Absolute Lymphocytes 0.9 0.8 - 5.3 10e3/uL    Absolute Monocytes 0.5 0.0 - 1.3 10e3/uL    Absolute Eosinophils 0.1 0.0 - 0.7 10e3/uL    Absolute Basophils 0.0 0.0 - 0.2 10e3/uL    Absolute  Immature Granulocytes 0.0 <=0.4 10e3/uL    Absolute NRBCs 0.0 10e3/uL   Freedom Draw    Narrative    The following orders were created for panel order Freedom Draw.  Procedure                               Abnormality         Status                     ---------                               -----------         ------                     Extra Red Top Tube[[id number]]                               Final result               Extra Heparinized Syringe[[id number]]                        Final result                 Please view results for these tests on the individual orders.   Extra Red Top Tube   Result Value Ref Range    Hold Specimen JIC    Extra Heparinized Syringe   Result Value Ref Range    Hold Specimen JIC    XR Chest 2 Views    Narrative    Exam:  XR CHEST 2 VIEWS    HISTORY: SOB.    COMPARISON:  3/29/2024    FINDINGS:     The cardiomediastinal contours are unchanged.      There are multiple discrete masses in the lungs, not significantly  changed over the short interval. Mild left basilar streaky opacity.   No pleural effusion or pneumothorax.    No acute osseous abnormality.       Impression    IMPRESSION:      Multiple masses in the lungs compatible with metastatic disease.    Mild streaky left basilar opacity is likely atelectasis or scarring.      ALBERTO ROSE MD         SYSTEM ID:  [id number]   CT Chest (PE) Abdomen Pelvis w Contrast    Narrative    PROCEDURE:  CT CHEST PE ABDOMEN PELVIS W CONTRAST.    HISTORY:  Evaluate for pulmonary embolism.  SOB. Elevated DIMER.    TECHNIQUE:  Initial pre-contrast  and localizer images were  obtained.  Contrast enhanced helical CT pulmonary angiography was then  performed.  Routine transaxial and post-processed (multiplanar and/or  MIP) reformations were obtained. This CT exam was performed using one  or more the following dose reduction techniques: automated exposure  control, adjustment of the mA and/or kV according to patient size,  and/or iterative  reconstruction technique.    COMPARISON:  3/29/2024    MEDS/CONTRAST: 84 mL Isovue-370    PULMONARY CTA FINDINGS:  This is a diagnostic quality helical CT  pulmonary angiogram.  There is no acute pulmonary embolism to the  first subsegmental pulmonary artery level.    OTHER FINDINGS:      Extensive pulmonary metastatic disease is redemonstrated. A dominant  right lower lobe mass measures 4.0 x 6.3 x 5.1 cm, previously 4.0 x  6.0 x 4.8 cm. Lesion in the left upper lobe measures up to 1.8 cm,  previously 1.7 cm. The external and hilar adenopathy similar, stable  to minimally worse.    A left thoracic inlet mass related to the thyroid gland is unchanged,  again associated with rightward deviation of the trachea.    No new focal consolidation is present.    Limited views of the upper abdomen reveal no adrenal mass or acute  process.     The heart size is unchanged. No pericardial or pleural effusion is  seen.    No suspicious osseous lesion is identified.      Impression    IMPRESSION:    No acute pulmonary emboli to the subsegmental level.    Minimal progression of diffuse pulmonary, mediastinal and hilar masses  when compared to the prior dated 3/29/2024.    EUSEBIA CUNHA MD         SYSTEM ID:  [id number]   Troponin T, High Sensitivity   Result Value Ref Range    Troponin T, High Sensitivity 20 (H) <=14 ng/L       Medications   sodium chloride (PF) 0.9% PF flush 50 mL (50 mLs Intravenous $Given 4/12/24 1158)   iopamidol (ISOVUE-370) solution 84 mL (84 mLs Intravenous $Given 4/12/24 1157)   ipratropium - albuterol 0.5 mg/2.5 mg/3 mL (DUONEB) neb solution 3 mL (3 mLs Nebulization $Given 4/12/24 1211)   ipratropium - albuterol 0.5 mg/2.5 mg/3 mL (DUONEB) neb solution 3 mL (3 mLs Nebulization $Given 4/12/24 1444)   budesonide (PULMICORT) neb solution 0.5 mg (0.5 mg Nebulization $Given 4/12/24 1446)       Assessments & Plan (with Medical Decision Making)     I have reviewed the nursing notes.    I have reviewed the  findings, diagnosis, plan and need for follow up with the patient.        New Prescriptions    No medications on file       Final diagnoses:   Shortness of breath   Hypoxia       4/12/2024   [facility]       Kyrie John MD  04/12/24 [id number]

## 2024-04-22 NOTE — TELEPHONE ENCOUNTER
Disp Refills Start End TRENA   digoxin (LANOXIN) 125 MCG tablet -- --  -- No     Last Office Visit: 01/03/2024  Future Office visit:    Next 5 appointments (look out 90 days)      Jul 15, 2024  9:30 AM  (Arrive by 9:15 AM)  Return Visit with Abel Nguyen DO  [facility] [place] ([facility] [place] ) [address]  [place] [address]  [phone]             Routing refill request to provider for review/approval because:

## 2024-04-23 NOTE — PROGRESS NOTES
Psychosocial distress thermometer reviewed. Pt rated stress score as  5. Pt rated stressors as sleep, fatigue, changes in eating, loss or change of physical abilities, worry or anxiety, sadness or depression,. Provider notified, and issues addressed.  Angella Mckeon LPN

## 2024-04-23 NOTE — NURSING NOTE
"Oncology Rooming Note    April 23, 2024 1:03 PM   Kerri Gonsales is a 78 year old female who presents for:    Chief Complaint   Patient presents with    Oncology Clinic Visit     New consult.      Initial Vitals: /72 (BP Location: Right arm, Patient Position: Sitting, Cuff Size: Adult Regular)   Pulse 63   Temp 98.4  F (36.9  C) (Tympanic)   Ht 1.702 m (5' 7\")   Wt 78.2 kg (172 lb 6.4 oz)   SpO2 97%   BMI 27.00 kg/m   Estimated body mass index is 27 kg/m  as calculated from the following:    Height as of this encounter: 1.702 m (5' 7\").    Weight as of this encounter: 78.2 kg (172 lb 6.4 oz). Body surface area is 1.92 meters squared.  No Pain (0) Comment: Data Unavailable   No LMP recorded. Patient is postmenopausal.  Allergies reviewed: Yes  Medications reviewed: Yes    Medications: Medication refills not needed today.  Pharmacy name entered into [facility]: [facility] [address] [facility] [address] [facility] [address]    Frailty Screening:   Is the patient here for a new oncology consult visit in cancer care? 2. No      Clinical concerns: none       Angella Mckeon LPN              "

## 2024-04-24 NOTE — TELEPHONE ENCOUNTER
Surgery dept received referral from Dr. Bruce for patient to have port placed. I called and spoke with her and procedure date of May 7th determined with Dr. Clemens. Surgery handbook reviewed and paper copy sent in mail. She was notified she would need to stop by clinic to  Hibiclens soap prior to her procedure date. Patient takes Coumadin. I gave her my direct number to call with any questions or concerns in the meantime.     Brittany RN Care Coordinator with General Surgery 4/24/2024 at 11:27 AM

## 2024-04-25 PROBLEM — C34.12 MALIGNANT NEOPLASM OF UPPER LOBE, LEFT BRONCHUS OR LUNG (H): Status: ACTIVE | Noted: 2024-01-01

## 2024-04-25 PROBLEM — C78.30: Status: ACTIVE | Noted: 2024-01-01

## 2024-04-25 PROBLEM — C80.1: Status: ACTIVE | Noted: 2024-01-01

## 2024-04-25 NOTE — PROGRESS NOTES
[facility]: Cancer Care                                                                                        TC to patient to inquire if she is able to go to [place] for a PET scan. She would need to coordinate it with her grand daughter but is willing. TC to [facility] scheduling. They have order from Dr. Steiner. They will contact patient and schedule her for 5-14-24.       Signature:  Leticia Longoria RN

## 2024-04-26 NOTE — TELEPHONE ENCOUNTER
Patient will need a thyroid ultrasound before deciding to do thyroid bx. Per Dr. Villalta. Messaged Dr. Bruce . Patient will be coming on 4/29/24 for Ultrasound

## 2024-04-30 NOTE — ANESTHESIA PREPROCEDURE EVALUATION
Anesthesia Pre-Procedure Evaluation    Patient: Kerri Gonsales   MRN: [id number] : 1945        Procedure : Procedure(s):  INSERTION, VASCULAR ACCESS PORT          Past Medical History:   Diagnosis Date    Atrial fibrillation with RVR (H) 2021    Benign essential hypertension 2021    Chronic diastolic heart failure (H) 2021    Elevated brain natriuretic peptide (BNP) level 2021    Goiter 2021    H/O hiatal hernia-moderate 2021    Left lower lobe pulmonary nodule 2021    Lower extremity edema 2021    Mixed hyperlipidemia 2024    New onset a-fib (H) 2021    On Coumadin for atrial fibrillation (H) 2021    Osteoarthritis of spine 2021    Persistent atrial fibrillation (H) 2021    Pneumonia due to infectious organism, unspecified laterality, unspecified part of lung 2021    Postmenopausal bleeding 2024    Prediabetes 2024    Simple chronic bronchitis (H) 2021    Stage 3 chronic kidney disease (H) 2024    Uncomplicated asthma       Past Surgical History:   Procedure Laterality Date    APPENDECTOMY      AS REMOVAL OF OVARY/TUBE(S) Left       Allergies   Allergen Reactions    Panmycin [Tetracycline] Unknown     Listed at [facility].     Aspirin Palpitations     Heart palpitations-with high doses.       Social History     Tobacco Use    Smoking status: Never    Smokeless tobacco: Never   Substance Use Topics    Alcohol use: Not Currently      Wt Readings from Last 1 Encounters:   24 78.2 kg (172 lb 6.4 oz)        Anesthesia Evaluation   Pt has had prior anesthetic. Type: General and MAC.        ROS/MED HX  ENT/Pulmonary: Comment: Simple chronic bronchitis  Left lower lobe pulmonary nodule  4/15/24 - discharged from [facility] (acute respiratory failure/hypoxia) (4 days in hospital) No bipap, cpap, or  intubation needed; was on oxygen; has home oxygen PRN, 2 LPM; uses when SpO2 less than 90    94-96 on room  "air in Preop 5/7/24    Pt struggles with laying flat; states she \"coughs and struggles to get air\"        (+)                      asthma Last exacerbation: duoneb this am and Budesonide; breathing feels \"OK\" right now per patient; no s/s respiratory distress,               (-) tobacco use   Neurologic: Comment: Back pain; required pillow under knees to tolerate lower HOB position from a pain standpoint.     5/2024   Small hemorrhagic infarct versus cavernous venous  malformation of the left cerebellar hemisphere without significant  mass effect.     Small subacute to late subacute infarct of the left temporal occipital  region.      IMPRESSION: Widespread metastatic disease, similar to that seen on the  recent prior study. Widespread pulmonary metastases including a  destructive expansile osteolytic mass involving the anterior left  sixth rib. This rib lesion would be amenable to percutaneous biopsy if  further testing is clinically warranted.     Probable all metastasis to the right adrenal gland.     Large suspicious pelvic mass possibly uterine or ovarian in origin. If  the primary site is otherwise unknown, this would be a candidate.        Cardiovascular: Comment: Last cards visit 1/3/24    Walks behind shopping cart 30-45 minutes; no chest pain or SOB with this activity    (+) Dyslipidemia hypertension-range: 118/72 (4/23/24)  150/65 in preop/ -   -  - -   Taking blood thinners (coumadin)  Instructions Given to patient: 5/2/24 last coumadin. CHF etiology: likely diastolic Last EF: 50-55 date: 2021               dysrhythmias, a-fib, Irregular Heartbeat/Palpitations (with anxiety per cardiology (1/3/24)),       Previous cardiac testing   Echo: Date: 2/2021 Results:  No pericardial effusion is present.  Borderline (EF 50-55%) reduced left ventricular function is present.     Stress Test:  Date: Results:    ECG Reviewed:  Date: 4/12/24 Results:  4/12/24  HR 90, Atrial fibrillation  ST & T wave abnormality, " consider lateral ischemia  Abnormal ECG  When compared with ECG of 29-MAR-2024 03:14,  T wave inversion no longer evident in Anterior leads  T wave inversion more evident in Lateral leads     3/29/24  Atrial fibrillation  Anterior infarct (cited on or before 03-JAN-2024)  Abnormal ECG  When compared with ECG of 03-JAN-2024 13:04,  Inverted T waves have replaced nonspecific T wave abnormality in Anterior leads  T wave inversion less evident in Lateral leads     Cath:  Date: Results:      METS/Exercise Tolerance: 3 - Able to walk 1-2 blocks without stopping    Hematologic:  - neg hematologic  ROS     Musculoskeletal:   (+)  arthritis (spine),             GI/Hepatic:     (+) GERD (no symptoms 5/7/24),     hiatal hernia,              Renal/Genitourinary: Comment: Creatinine 0.7 (4/15/24)    (+) renal disease (stage 3), type: CRI,            Endo:     (+)  type II DM, Last HgA1c: 7.8, date: 1/3/24, Not using insulin, - not using insulin pump.    thyroid problem, 4/29/24 Dominant mass left lobe of thyroid. FNA is recommended.,           Psychiatric/Substance Use:       Infectious Disease:       Malignancy: Comment: Left sided lung cancer, inconclusive on biopsy  (+) Malignancy, History of Lung.    Other: Comment: Pt states she is claustrophobic was able to tolerate MRI/CT           Physical Exam    Airway        Mallampati: IV   TM distance: > 3 FB   Neck ROM: limited   Mouth opening: < 3 cm    Respiratory Devices and Support         Dental     Comment: #7 tooth loose; capped teeth on top, some missing    (+) Modest Abnormalities - crowns, retainers, 1 or 2 missing teeth      Cardiovascular          Rhythm and rate: irregular and normal     Pulmonary           breath sounds clear to auscultation           OUTSIDE LABS:  CBC:   Lab Results   Component Value Date    WBC 6.8 04/12/2024    WBC 7.7 03/29/2024    HGB 12.5 04/12/2024    HGB 12.1 03/29/2024    HCT 37.2 04/12/2024    HCT 36.2 03/29/2024     04/12/2024     " 03/29/2024     BMP:   Lab Results   Component Value Date     (L) 04/12/2024     (L) 03/29/2024    POTASSIUM 4.0 04/12/2024    POTASSIUM 4.8 03/29/2024    CHLORIDE 91 (L) 04/12/2024    CHLORIDE 94 (L) 03/29/2024    CO2 30 (H) 04/12/2024    CO2 26 03/29/2024    BUN 18.3 04/12/2024    BUN 21.1 03/29/2024    CR 0.95 04/12/2024    CR 0.88 03/29/2024     (H) 04/12/2024     (H) 03/29/2024     COAGS:   Lab Results   Component Value Date    INR 1.3 (H) 04/14/2024     POC: No results found for: \"BGM\", \"HCG\", \"HCGS\"  HEPATIC:   Lab Results   Component Value Date    ALBUMIN 4.0 03/29/2024    PROTTOTAL 8.1 03/29/2024    ALT 20 03/29/2024    AST 38 03/29/2024    ALKPHOS 98 03/29/2024    BILITOTAL 0.4 03/29/2024     OTHER:   Lab Results   Component Value Date    LACT 1.5 03/29/2024    A1C 7.8 (H) 01/03/2024    KORI 9.3 04/12/2024    MAG 2.0 03/29/2024    TSH 0.64 01/03/2024    T4 2.29 (H) 12/08/2021    CRP 17.3 (H) 02/13/2021       Anesthesia Plan    ASA Status:  4    NPO Status:  NPO Appropriate (last PO intake 2000; water 1/4 cup water with pills including diltiazem)    Anesthesia Type: MAC.     - Reason for MAC: straight local not clinically adequate              Consents    Anesthesia Plan(s) and associated risks, benefits, and realistic alternatives discussed. Questions answered and patient/representative(s) expressed understanding.     - Discussed:     - Discussed with:  Patient, Other (See Comment)      - Extended Intubation/Ventilatory Support Discussed: No.      - Patient is DNR/DNI Status: No     Use of blood products discussed: No .     Postoperative Care            Comments:    Other Comments: Chart reviewed. Recent notes from [facility] discharge, [facility].    Risks and benefits of MAC anesthetic discussed including dental damage, aspiration, loss of airway, conversion to general anesthetic, CV complications, MI, stroke, death. Pt wishes to proceed.     Case cancelled by " "surgeon in preoperative area.                Azul Moreno, DICK CNP    I have reviewed the pertinent notes and labs in the chart from the past 30 days and (re)examined the patient.  Any updates or changes from those notes are reflected in this note.     # Hyponatremia: Lowest Na = 133 mmol/L in last 30 days, will monitor as appropriate        # Coagulation Defect: INR = 1.3 (Ref range: 0.9 - 1.1) and/or PTT = N/A, will monitor for bleeding   # DMII: A1C = N/A within past 6 months  # Overweight: Estimated body mass index is 27 kg/m  as calculated from the following:    Height as of 4/23/24: 1.702 m (5' 7\").    Weight as of 4/23/24: 78.2 kg (172 lb 6.4 oz).      "

## 2024-05-02 NOTE — TELEPHONE ENCOUNTER
metoprolol tartrate (LOPRESSOR) 100 MG tablet -- --  -- No   Sig - Route: Take 200 mg by mouth 2 times daily - morning and midnight. - Oral   Class: Historical   Last Office Visit: 01/03/2024     Future Office visit:    Next 5 appointments (look out 90 days)      May 14, 2024  2:30 PM  (Arrive by 2:15 PM)  Return Visit with Celia Bruce MD  [facility] ([facility] ) [address] [facility] [address] [address]  [phone]     Jul 15, 2024  9:30 AM  (Arrive by 9:15 AM)  Return Visit with Abel Nguyen DO  [facility] ([facility] ) [address] [address]  [phone]             Routing refill request to provider for review/approval because:

## 2024-05-07 NOTE — CARE PLAN
Phoned pt to inform that per Dr Bruce, she should continue to hold her warfarin until she knows when her upcoming biopsy will be scheduled.

## 2024-05-07 NOTE — TELEPHONE ENCOUNTER
Talked with  Marcin again.      I spoke with Dr. Doyle and explained the situation.   Dr. Doyle said no to the PET tomorrow if they do decide to move forward with the port today due to it outshining the area and not getting a quality exam.    I asked how long after port placement until we can do the exam.   He didn't really know but did recommend to have the test first then the port if at all possible.    I called and connected with Marcin.  She informed me they did decide to cancel the port placement today and they will plan on moving forward with PET tomorrow.

## 2024-05-07 NOTE — H&P
Surgery Consult Clinic Note      RE: Kerri Gonsales  : 1945        Chief Complaint:  Port a cath placement    History of Present Illness:  Kerri Gonsales is a very pleasant 78 year old year old female who I am seeing at the request of Dr. Bruce for evaluation port a cath placement due to high grade neoplasm.  Her granddaughter is in attendance for today's appointment.    She denies any limitation to movement of her neck.  Reports no surgery to her neck or upper chest.  She specifically denies fever, chills, nausea, vomiting, chest pain, shortness of breath or palpitations.          Medical history:  Past Medical History:   Diagnosis Date    Atrial fibrillation with RVR (H) 2021    Benign essential hypertension 2021    Chronic diastolic heart failure (H) 2021    Elevated brain natriuretic peptide (BNP) level 2021    Goiter 2021    H/O hiatal hernia-moderate 2021    Left lower lobe pulmonary nodule 2021    Lower extremity edema 2021    Mixed hyperlipidemia 2024    New onset a-fib (H) 2021    On Coumadin for atrial fibrillation (H) 2021    Osteoarthritis of spine 2021    Persistent atrial fibrillation (H) 2021    Pneumonia due to infectious organism, unspecified laterality, unspecified part of lung 2021    Postmenopausal bleeding 2024    Prediabetes 2024    Simple chronic bronchitis (H) 2021    Stage 3 chronic kidney disease (H) 2024    Uncomplicated asthma        Surgical history:  Past Surgical History:   Procedure Laterality Date    APPENDECTOMY      AS REMOVAL OF OVARY/TUBE(S) Left        Family history:  Family History   Problem Relation Age of Onset    Heart Surgery Father     Diabetes Daughter     Coronary Artery Disease No family hx of     Hypertension No family hx of     Hyperlipidemia No family hx of     Breast Cancer No family hx of     Colon Cancer No family hx of     Prostate Cancer No  family hx of     Cerebrovascular Disease No family hx of        Medications:  Prior to Admission medications    Medication Sig Start Date End Date Taking? Authorizing Provider   albuterol (PROAIR HFA/PROVENTIL HFA/VENTOLIN HFA) 108 (90 Base) MCG/ACT inhaler Inhale 2 puffs into the lungs every 4 hours as needed for shortness of breath, wheezing or cough (every 4-6 hours)   Yes Reported, Patient   budesonide (PULMICORT) 0.5 MG/2ML neb solution Take 0.5 mg by nebulization 2 times daily as needed (shortness of breath/ wheezing) 2/13/23  Yes Reported, Patient   digoxin (LANOXIN) 125 MCG tablet TAKE 1 TABLET BY MOUTH EVERY OTHER DAY 4/22/24  Yes Abel Nguyen, DO   diltiazem ER (TIAZAC) 120 MG 24 hr ER beaded capsule Take 120 mg by mouth every 12 hours (8:00 AM/8:30 PM)   Yes Reported, Patient   furosemide (LASIX) 40 MG tablet Take 1 tablet (40 mg) by mouth daily 6/26/23  Yes Abel Nguyen, DO   gentamicin (GARAMYCIN) 0.3 % ophthalmic solution Place 2 drops into both eyes every 4 hours as needed (dry eyes) 5/6/20  Yes Reported, Patient   ipratropium - albuterol 0.5 mg/2.5 mg/3 mL (DUONEB) 0.5-2.5 (3) MG/3ML neb solution Take 3 mLs by nebulization 2 times daily -and every 4 hours as needed. 5/11/21  Yes Reported, Patient   losartan (COZAAR) 50 MG tablet Take 50 mg by mouth at bedtime -8:30 PM.   Yes Reported, Patient   meclizine (ANTIVERT) 25 MG tablet Take 25 mg by mouth as needed for dizziness   Yes Reported, Patient   metFORMIN (GLUCOPHAGE) 500 MG tablet Take 500 mg by mouth 2 times daily (with meals) 3/30/24  Yes Reported, Patient   metoprolol tartrate (LOPRESSOR) 100 MG tablet Take 2 tablets by mouth twice daily 5/2/24  Yes Abel Nguyen, DO   warfarin ANTICOAGULANT (COUMADIN) 5 MG tablet Take 1/2 tablet (2.5 mg) by mouth once daily in the evening on Monday. Take 1 tablet (5 mg) all other days or as directed by [facility] anticoagulation. Patient takes at 4:00 PM.    Reported, Patient  "      Allergies:  The patient is allergic to panmycin [tetracycline] and aspirin.  .  Social history:  Social History     Tobacco Use    Smoking status: Never    Smokeless tobacco: Never   Substance Use Topics    Alcohol use: Not Currently     Marital status: .    Review of Systems:    Constitutional: Negative for fever, chills and weight loss.   HENT: Negative for ear pain, nosebleeds, congestion, sore throat, tinnitus and ear discharge.    Eyes: Negative for blurred vision, double vision, photophobia and pain.   Respiratory: Negative for wheezing and stridor.    Cardiovascular: Negative for chest pain, palpitations and orthopnea.   Gastrointestinal: Negative for heartburn, nausea, vomiting, abdominal pain and blood in stool.   Genitourinary: Negative for urgency, frequency and hematuria.   Musculoskeletal: Negative for myalgias, back pain and joint pain.   Neurological: Negative for tingling, speech change and headaches.   Endo/Heme/Allergies: Does not bruise/bleed easily.   Psychiatric/Behavioral: Negative for depression, suicidal ideas and hallucinations. The patient is not nervous/anxious.    Physical Examination:  /65   Temp 97.8  F (36.6  C) (Tympanic)   Resp 16   Ht 1.676 m (5' 6\")   Wt 77.1 kg (170 lb)   SpO2 96%   BMI 27.44 kg/m    General: Alert and orientedx4, no acute distress, well-developed/well-nourished, ambulating without assistance  HEENT: normocephalic atraumatic, extraocular movements intact, PERRL Sclerae anicteric; Trachea mideline, no JVD  Chest:   Clear to auscultation bilaterally.    Cardiac:  Irregular rate and rhythm without additional sounds  Abdomen: Soft, non-tender, non-distended  Extremities: Cursory exam unremarkable.  No peripheral edema noted.  Skin: Warm, dry, less than 2 sec cap refill  Neuro: CN 2-12 grossly intact, no focal deficit, GCS 15  Psych: Pleasant, calm, asks appropriate questions      Assessment/Plan:  This procedure was cancelled.       Galilea" [facility] and [address]    [address]    Referring Provider:  No referring provider defined for this encounter.     Primary Care Provider:  Telly Bardales

## 2024-05-07 NOTE — TELEPHONE ENCOUNTER
Patient is scheduled for a PET exam tomorrow.  Discussed with Marcin.    -exam prep  -where and when to register  -exam length    They are here at the facility due to get a port placed.  Surgeon is waiting for a call back from Dr. Bruce to see if they want to move forward.    I will talk to radiologist as this may interfere with imaging tomorrow.  Marcin is aware of the plan and will be expecting a call later today to touch base.

## 2024-05-08 NOTE — PROVIDER NOTIFICATION
Pt agreed to left rib biopsy on 5/21/2024 arrival 0800. Pt has H&P scheduled for 5/13/2024. Pt's primary clinic called and notified that INR on 5/21/2024 will need to be 1.5 or less.

## 2024-05-09 NOTE — PROVIDER NOTIFICATION
Pt agreed to 5/21/2024 for bone biopsy, pt instructed to have a , nothing to eat or drink after midnight before procedure. Pt has H&P scheduled for 5/13/2024. [facility] to instruct pt on coumadin dosing INR will need to be less than 1.5. Pt verbalized understanding.

## 2024-05-10 NOTE — PROGRESS NOTES
[facility]/Voicemail    Clinical Data: Care Coordinator Outreach    Outreach attempted x 3.  Left message on  . [facility]  voicemail with call back information and requested return call.    Plan: Care Coordinator will try to reach patient again in 1-2 business days.    Scheduling urgent bone biopsy

## 2024-05-10 NOTE — PROGRESS NOTES
[facility]: Cancer Care                                                                                        Received a call back from [facility] they were not able to schedule the patient any sooner the 5-22-24. Patient scheduled with [facility] on 5-21-24. MD updated.       Signature:  Leticia Longoria RN

## 2024-05-14 NOTE — NURSING NOTE
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    Weight as of this encounter: 76.7 kg (169 lb 1.5 oz). Body surface area is 1.89 meters squared.  No Pain (0) Comment: no pain  No LMP recorded. Patient is postmenopausal.  Allergies reviewed: Yes  Medications reviewed: Yes    Medications: Medication refills not needed today.  Pharmacy name entered into [employer]: [facility] [id number] [address]     Frailty Screening:   Is the patient here for a new oncology consult visit in cancer care? 2. No      Clinical concerns: none       Kassandra Reyes LPN             "

## 2024-05-15 NOTE — PROGRESS NOTES
MEDICAL ONCOLOGY FOLLOW UP NOTE  May 14, 2024    Reason for follow up: High Grade Neoplasm    HISTORY OF PRESENT ILLNESS  Kerri Gonsales is a 78 year old female with PMH as stated below who is seen in the oncology clinic    Her history in short is as follows    Ms. Larkin initially presented with hemoptysis and hypoxia to the [facility] ED     3/29/2024: CT chest with contrast:Findings most consistent with metastatic malignant neoplasm with  numerous nodules/masses throughout the parenchyma both lungs as well  as mediastinal and hilar lymphadenopathy. Interval enlargement of a 6.3 cm mass involving the left lobe of  thyroid gland. New 1.7 x 1.2 cm right adrenal gland nodule, also concerning for  metastatic disease. No distinct evidence of pulmonary embolus..    She was then transferred to [facility]. Underwent Bronchoscopy and was found to have a left upper lobe endobronchial tumor. Biopsy was not possible oozing.     She then underwent CT guided biopsy of lung nodules    3/29/2024: Left upper lobe, needle biopsy: High-grade malignant neoplasm.  CK7 negative, CK20 negative, CK8/18, rare positive cells, TTF-1 1 negative, Napsin A negative, p40 negative,  negative, estrogen receptor negative, CDX2 negative, S100 negative, CD20 negative, CD3 negative CD5 negative CD10 negative, BCL-6 positive, mom-1 negative, kappa and lambda JADA negative, EBV JADA negative C-MYC strongly positive, Bcl-2 negative, CD4 negative,  negative CD56 strongly positive, CD43 highlights B cells negative, CD7 negative, CD68 negative    4/12/2024: Pathology consult: [facility] laboratories: Lung, left upper lobe, needle biopsy: High-grade malignant neoplasm.  Immunostains were performed on block A1 and the tumor cells were negative for any UT.  Chromogranin and synaptophysin were negative    Focal staining for keratin however this was not sufficient for diagnosis of carcinoma. NUT immunostain negative    She then again presented was seen in  the emergency room at [place] on 4/14/2024. At that time she was found to have hypoxic and was again transferred to [facility].     4/12/2024: CT chest with contrast: No acute pulmonary emboli to the subsegmental level.   Minimal progression of diffuse pulmonary, mediastinal and hilar masses  when compared to the prior dated 3/29/2024.    4/29/2024: Ultrasound thyroid: Right lobe of the thyroid measures 4.6 x 1.8 x 1.5 cm. Left  lobe of the thyroid measures 7.5 x 2.2 x 2.1 cm. Isthmus measures 7  mm.     There is a large mass in the mid and lower pole of the left lobe of  the thyroid. This measures 5.6 x 4.6 x 4.8 cm in size. This is solid,  heterogeneous in echotexture with hyperechoic and isoechoic areas,  tolerated than wide with poorly visualized margins. There are probably  some internal calcifications. This is a TI-RAD 5 lesion and FNA is  recommended. There is also a small 4 x 4 by 2 mm hyperechoic lesion in the right right side of the isthmus. No FNA is necessary for this lesion.    5/3/2024: CT abdomen and pelvis with contrast: Widespread metastatic disease, similar to that seen on the  recent prior study. Widespread pulmonary metastases including a  destructive expansile osteolytic mass involving the anterior left  sixth rib. This rib lesion would be amenable to percutaneous biopsy if  further testing is clinically warranted.     Probable all metastasis to the right adrenal gland.     Large suspicious pelvic mass possibly uterine or ovarian in origin. If  the primary site is otherwise unknown, this would be a candidate.    5/3/2024: MR brain with and without contrast: Small hemorrhagic infarct versus cavernous venous  malformation of the left cerebellar hemisphere without significant  mass effect.Small subacute to late subacute infarct of the left temporal occipital  region.    5/8/2024: PET scan: Widespread pulmonary metastatic disease.There is osseous metastatic disease involving the left anterior  sixth  rib, left proximal humerus and possibly the left maxillary sinus.   Large hypermetabolic pelvic mass involving the right ovary and/or  uterus. There are at least two small metastases involving left pelvic  lymph nodes.    Interim history:    Ms. Gonsales is seen in clinic today with her granddaughter.  She overall has been doing well.  She denies any progressive shortness of breath or cough.  She is using oxygen on and off at home.  She is able to do light by housework around the house.  Energy level has been low.  Denies any abdominal pain.  Denies any vaginal bleeding.    REVIEW OF SYSTEMS  A 12-point ROS negative except as in HPI    Current Outpatient Medications   Medication Sig Dispense Refill    albuterol (PROAIR HFA/PROVENTIL HFA/VENTOLIN HFA) 108 (90 Base) MCG/ACT inhaler Inhale 2 puffs into the lungs every 4 hours as needed for shortness of breath, wheezing or cough (every 4-6 hours)      budesonide (PULMICORT) 0.5 MG/2ML neb solution Take 0.5 mg by nebulization 2 times daily as needed (shortness of breath/ wheezing)      digoxin (LANOXIN) 125 MCG tablet TAKE 1 TABLET BY MOUTH EVERY OTHER DAY 45 tablet 3    diltiazem ER (TIAZAC) 120 MG 24 hr ER beaded capsule Take 120 mg by mouth every 12 hours (8:00 AM/8:30 PM)      furosemide (LASIX) 40 MG tablet Take 1 tablet (40 mg) by mouth daily 90 tablet 3    ipratropium - albuterol 0.5 mg/2.5 mg/3 mL (DUONEB) 0.5-2.5 (3) MG/3ML neb solution Take 3 mLs by nebulization 2 times daily -and every 4 hours as needed.      losartan (COZAAR) 50 MG tablet Take 50 mg by mouth at bedtime -8:30 PM.      metFORMIN (GLUCOPHAGE) 500 MG tablet Take 500 mg by mouth 2 times daily (with meals)      metoprolol tartrate (LOPRESSOR) 100 MG tablet Take 2 tablets by mouth twice daily 360 tablet 3    warfarin ANTICOAGULANT (COUMADIN) 5 MG tablet Take 1/2 tablet (2.5 mg) by mouth once daily in the evening on Monday. Take 1 tablet (5 mg) all other days or as directed by . [facility]  anticoagulation. Patient takes at 4:00 PM.      gentamicin (GARAMYCIN) 0.3 % ophthalmic solution Place 2 drops into both eyes every 4 hours as needed (dry eyes) (Patient not taking: Reported on 5/14/2024)      meclizine (ANTIVERT) 25 MG tablet Take 25 mg by mouth as needed for dizziness (Patient not taking: Reported on 5/14/2024)         Allergies   Allergen Reactions    Panmycin [Tetracycline] Unknown     Listed at [facility].     Aspirin Palpitations     Heart palpitations-with high doses.      Immunization History   Administered Date(s) Administered    COVID-19 Monovalent 18+ (Moderna) 02/15/2021, 03/15/2021, 08/02/2022    Influenza (High Dose) 3 valent vaccine 01/17/2017, 10/12/2017, 11/06/2019    Influenza (IIV3) PF 12/01/2011    Influenza Vaccine 65+ (Fluzone HD) 09/16/2020, 09/28/2021, 10/21/2022, 11/28/2023    Influenza, seasonal, injectable, PF 10/01/2010    Pneumo Conj 13-V (2010&after) 01/17/2017    Pneumococcal 23 valent 10/01/2010, 01/26/2018    TDAP Vaccine (Adacel) 01/27/2017       Past Medical History:   Diagnosis Date    Atrial fibrillation with RVR (H) 02/22/2021    Benign essential hypertension 02/12/2021    Chronic diastolic heart failure (H) 02/12/2021    Elevated brain natriuretic peptide (BNP) level 03/31/2021    Goiter 03/31/2021    H/O hiatal hernia-moderate 03/31/2021    Left lower lobe pulmonary nodule 03/31/2021    Lower extremity edema 03/31/2021    Mixed hyperlipidemia 01/29/2024    New onset a-fib (H) 02/12/2021    On Coumadin for atrial fibrillation (H) 03/31/2021    Osteoarthritis of spine 03/31/2021    Persistent atrial fibrillation (H) 03/31/2021    Pneumonia due to infectious organism, unspecified laterality, unspecified part of lung 02/22/2021    Postmenopausal bleeding 01/29/2024    Prediabetes 01/29/2024    Simple chronic bronchitis (H) 02/12/2021    Stage 3 chronic kidney disease (H) 01/29/2024    Uncomplicated asthma        Past Surgical History:   Procedure Laterality Date     APPENDECTOMY      AS REMOVAL OF OVARY/TUBE(S) Left        SOCIAL HISTORY  History   Smoking Status    Never   Smokeless Tobacco    Never    Social History    Substance and Sexual Activity      Alcohol use: Not Currently     History   Drug Use Unknown       FAMILY HISTORY  Family History   Problem Relation Age of Onset    Heart Surgery Father     Diabetes Daughter     Coronary Artery Disease No family hx of     Hypertension No family hx of     Hyperlipidemia No family hx of     Breast Cancer No family hx of     Colon Cancer No family hx of     Prostate Cancer No family hx of     Cerebrovascular Disease No family hx of        PHYSICAL EXAMINATION  /52   Pulse 73   Temp 98  F (36.7  C) (Tympanic)   Wt 76.7 kg (169 lb 1.5 oz)   SpO2 98%   BMI 27.29 kg/m    Wt Readings from Last 2 Encounters:   05/14/24 76.7 kg (169 lb 1.5 oz)   05/07/24 77.1 kg (170 lb)     Physical Exam  Constitutional:       Appearance: Normal appearance.   Pulmonary:      Effort: Pulmonary effort is normal.      Breath sounds: Normal breath sounds.   Musculoskeletal:         General: No swelling.   Skin:     Capillary Refill: Capillary refill takes less than 2 seconds.   Neurological:      Mental Status: She is alert.     Laboratory and Imaging:     Latest Reference Range & Units 04/12/24 11:06   WBC 4.0 - 11.0 10e3/uL 6.8   Hemoglobin 11.7 - 15.7 g/dL 12.5   Hematocrit 35.0 - 47.0 % 37.2   Platelet Count 150 - 450 10e3/uL 194       ASSESSMENT AND PLAN    High Grade Neoplasm:    Initially presented to the ED with hemoptysis and hypoxia. Imaging done showed pulmonary masses, mediastinal and hilar adenopathy and adrenal nodule. Underwent bronchoscopy which showed endobronchial Left upper lobe mass. Biopsy was not possible because of oozing on warfarin. She then underwent a CT guided biopsy of lung masses. Pathology showed high grade neoplasm. Focal keratin staining was positive. C-myc was strongly positive but NUT immunostains negative.  Chromogranin and synaptophysin also negative.  was 29. CEA <2.     Staging workup done till now has showed a large hypermetabolic pelvic mass involving the right ovary and the uterus.  Also found to have possible metastasis involving pelvic lymph nodes on PET scan.  MRI brain was negative for cancer.  Thyroid ultrasound showed she is also scheduled for biopsy of left anterior sixth rib.  Foundation 1 is unrevealing for any particular cancer.  MSI is stable.  TMB is 0    Overall she continues to be stable.  She has lost slight amount of weight.  Her breathing currently is stable and she is 98% today on O2 sats without oxygen.  She had labs done recently with her primary care which are also stable.      At this point I discussed that I would recommend we wait till we get the red biopsy to see if more information can be gained regarding what type of malignancy we are dealing with.  It is possible that the primary is ovarian versus uterine in origin however the question is if this is a sarcoma versus carcinoma.  1 option is to treat with a broad based regimen like Carbo and Taxol however as she is stable I would recommend we wait for the biopsy to see if it provides us with more information.      Follow up in clinic in 3 weeks.     Total time spent on the patient on day of encounter was 55 minutes doing chart review, outside records, reviewing scans, review of test results, interpretation of results, patient visit and documentation.       Celia Bruce MD

## 2024-05-21 NOTE — DISCHARGE INSTRUCTIONS
"    Post-Anesthesia Patient Instructions    IMMEDIATELY FOLLOWING SURGERY:  Do not drive or operate machinery for the first twenty four hours after surgery.  Do not make any important decisions for twenty four hours after surgery or while taking narcotic pain medications or sedatives.  If you develop intractable nausea and vomiting or a severe headache please notify your doctor immediately.    FOLLOW-UP:  Please make an appointment with your surgeon as instructed. You do not need to follow up with anesthesia unless specifically instructed to do so.    WOUND CARE INSTRUCTIONS (if applicable):  Keep a dry clean dressing on the anesthesia/puncture wound site if there is drainage.  Once the wound has quit draining you may leave it open to air.  Generally you should leave the bandage intact for twenty four hours unless there is drainage.  If the epidural site drains for more than 36-48 hours please call the anesthesia department.    QUESTIONS?:  Please feel free to call your physician or the hospital  if you have any questions, and they will be happy to assist you.       DISCHARGE INSTRUCTIONS  Needle Aspiration      IMPORTANT: As you prepare for discharge, the following information will help you return to your best level of health.               This Information Is About Your Follow Up Care     Call your doctor if you do not get better. Call sooner if you feel worse. You can reach your doctor by calling their clinic phone number.                                    This Information Is About Procedures      NEEDLE ASPIRATION  You have had a needle aspiration.  A needle aspiration (also called a \"fine needle biopsy\") is a procedure used to take a sample of cells or tissues from a lump or mass or other area of concern.  The sample of cells taken during the procedure can then be checked in the lab under a microscope to find out if there is cancer or other diseases.   Sometimes a needle aspiration is done to check the " effect of treatment on a known tumor.    When you go home, follow these instructions:  Check the site frequently for bleeding, swelling, redness, or drainage.  Use an ice pack at the site for 20 minutes at a time if needed for pain.  Rest for the remainder of the day.  Don't drive for 24 hours if you received medicine to relax you during the procedure.  Return to your usual diet.  Do not take aspirin or anti-inflammatory medicines like ibuprofen or naproxen (check with your doctor if you take aspirin for heart disease or stroke).  You may take acetaminophen (Tylenol) for pain if needed.  Do not take blood thinner medicines until your doctor tells you to restart them.    Call your doctor if you have:  Continued bleeding  Swelling or a lump at the needle site  Pain not made better by acetaminophen  Fever or chills  Redness or drainage from the needle site  Chest pain or trouble breathing  Any questions or concerns

## 2024-05-21 NOTE — PROGRESS NOTES
Procedure: Image guided bone biopsy of left sixth rib    There were no complications and patient has no symptoms.      Tolerated procedure well.    Radiologist:Dr. Doyle    Time Out: Prior to the start of the procedure and with procedural staff participation, I verbally confirmed the patient s identity using two indicators, relevant allergies, that the procedure was appropriate and matched the consent or emergent situation, and that the correct equipment/implants were available. Immediately prior to starting the procedure I conducted the Time Out with the procedural staff and re-confirmed the patient s name, procedure, and site/side. (The Joint Commission universal protocol was followed.)  Yes    Position:  supine    Pain:  4    Sedation: Moderate (conscious) sedation: TIME IN MINUTES FROM START OF SEDATION TO END OF PROCEDURE:  23 minutes.    Estimated Blood Loss: None     Condition: Stable    Comments: See dictated procedure note for full details     Patito Linton RN    Biopsy site WNL. Patient alert, denies pain at biopsy site. Instructions gone over with patient and granddaughter. Wheelchair ride with belongings to granddaughter's car.

## 2024-05-22 NOTE — TELEPHONE ENCOUNTER
Patient called to see how she is feeling today after her bone biopsy yesterday. Patient states that she is having some discomfort in the biopsy area. This area is under her breast so she was encouraged to wear a bra when it's comfortable for her. Instructed that when she removes bandaid, she should place Kleenex, a paper towel, her shirt, something in between her breast and skin to allow air flow for the biopsy site to continue to heal. Encouraged patient to have her granddaughter assess the area today to make sure there are no signs or symptoms of infection. Patient denies any further questions or concerns. Encouraged to call us back if she does have questions.

## 2024-05-23 NOTE — TELEPHONE ENCOUNTER
losartan (COZAAR) 50 MG tablet -- --  -- No   Sig - Route: Take 50 mg by mouth at bedtime -8:30 PM. - Oral   Class: Historical        Last Office Visit: 01/03/24  Future Office visit:    Next 5 appointments (look out 90 days)      Jun 06, 2024  1:30 PM  (Arrive by 1:15 PM)  Return Visit with Celia Bruce MD  [facility] ([facility] ) [address] [facility] [address]  [phone]     Jul 15, 2024  9:30 AM  (Arrive by 9:15 AM)  Return Visit with Abel Nguyen DO  [facility] ([facility] ) [address] [facility] [address]  [phone]             Routing refill request to provider for review/approval because:

## 2024-05-30 NOTE — TELEPHONE ENCOUNTER
Disp Refills Start End TRENA   furosemide (LASIX) 40 MG tablet 90 tablet 3 6/26/2023 -- No     Last Office Visit: 05/14/2024  Future Office visit:    Next 5 appointments (look out 90 days)      Jun 06, 2024  1:30 PM  (Arrive by 1:15 PM)  Return Visit with Celia Bruce MD  [facility] ([facility] ) [address] [facility] [address]  [phone]     Jul 15, 2024  9:30 AM  (Arrive by 9:15 AM)  Return Visit with Abel Nguyen DO  [facility] ([facility] ) [address]  [facility] [address]  [phone]             Routing refill request to provider for review/approval because:

## 2024-06-06 PROBLEM — C49.9 LEIOMYOSARCOMA (H): Status: ACTIVE | Noted: 2024-01-01

## 2024-06-06 NOTE — PROGRESS NOTES
MEDICAL ONCOLOGY FOLLOW UP NOTE  Jun 6, 2024    Reason for follow up: Uterine Leiomyosarcoma    HISTORY OF PRESENT ILLNESS  Kerri Gonsales is a 79 year old female with PMH as stated below who is seen in the oncology clinic    Her history in short is as follows    Ms. Larkin initially presented with hemoptysis and hypoxia to the [place] ED     3/29/2024: CT chest with contrast:Findings most consistent with metastatic malignant neoplasm with  numerous nodules/masses throughout the parenchyma both lungs as well  as mediastinal and hilar lymphadenopathy. Interval enlargement of a 6.3 cm mass involving the left lobe of  thyroid gland. New 1.7 x 1.2 cm right adrenal gland nodule, also concerning for  metastatic disease. No distinct evidence of pulmonary embolus..    She was then transferred to [facility]. Underwent Bronchoscopy and was found to have a left upper lobe endobronchial tumor. Biopsy was not possible oozing.     She then underwent CT guided biopsy of lung nodules    3/29/2024: Left upper lobe, needle biopsy: High-grade malignant neoplasm.  CK7 negative, CK20 negative, CK8/18, rare positive cells, TTF-1 1 negative, Napsin A negative, p40 negative,  negative, estrogen receptor negative, CDX2 negative, S100 negative, CD20 negative, CD3 negative CD5 negative CD10 negative, BCL-6 positive, mom-1 negative, kappa and lambda JADA negative, EBV JADA negative C-MYC strongly positive, Bcl-2 negative, CD4 negative,  negative CD56 strongly positive, CD43 highlights B cells negative, CD7 negative, CD68 negative    4/12/2024: Pathology consult: [facility] laboratories: Lung, left upper lobe, needle biopsy: High-grade malignant neoplasm.  Immunostains were performed on block A1 and the tumor cells were negative for any UT.  Chromogranin and synaptophysin were negative    Focal staining for keratin however this was not sufficient for diagnosis of carcinoma. NUT immunostain negative    She then again presented was seen  in the emergency room at [place] on 4/14/2024. At that time she was found to have hypoxic and was again transferred to [facility].     4/12/2024: CT chest with contrast: No acute pulmonary emboli to the subsegmental level.   Minimal progression of diffuse pulmonary, mediastinal and hilar masses  when compared to the prior dated 3/29/2024.    4/29/2024: Ultrasound thyroid: Right lobe of the thyroid measures 4.6 x 1.8 x 1.5 cm. Left  lobe of the thyroid measures 7.5 x 2.2 x 2.1 cm. Isthmus measures 7  mm.     There is a large mass in the mid and lower pole of the left lobe of  the thyroid. This measures 5.6 x 4.6 x 4.8 cm in size. This is solid,  heterogeneous in echotexture with hyperechoic and isoechoic areas,  tolerated than wide with poorly visualized margins. There are probably  some internal calcifications. This is a TI-RAD 5 lesion and FNA is  recommended. There is also a small 4 x 4 by 2 mm hyperechoic lesion in the right right side of the isthmus. No FNA is necessary for this lesion.    5/3/2024: CT abdomen and pelvis with contrast: Widespread metastatic disease, similar to that seen on the  recent prior study. Widespread pulmonary metastases including a  destructive expansile osteolytic mass involving the anterior left  sixth rib. This rib lesion would be amenable to percutaneous biopsy if  further testing is clinically warranted.     Probable all metastasis to the right adrenal gland.     Large suspicious pelvic mass possibly uterine or ovarian in origin. If  the primary site is otherwise unknown, this would be a candidate.    5/3/2024: MR brain with and without contrast: Small hemorrhagic infarct versus cavernous venous  malformation of the left cerebellar hemisphere without significant  mass effect.Small subacute to late subacute infarct of the left temporal occipital  region.    5/8/2024: PET scan: Widespread pulmonary metastatic disease.There is osseous metastatic disease involving the left anterior  sixth  rib, left proximal humerus and possibly the left maxillary sinus.   Large hypermetabolic pelvic mass involving the right ovary and/or  uterus. There are at least two small metastases involving left pelvic  lymph nodes.    5/21/2024: Rib, left, lesion CT-guided needle core biopsy:Malignant neoplasm, most consistent with epithelioid leiomyosarcoma (see comment)     positive for p16, vimentin, smooth muscle actin, calponin, WT1 (60%) and EMA (5-10%)    Interim history:    Ms. Gonsales is seen in clinic today with her granddaughter.  She overall has been doing well.  She continues to be fatigued.  Complains of shortness of breath on and off.  Weight and appetite have been stable.  Denies any worsening pain.  Denies any vaginal bleeding.    REVIEW OF SYSTEMS  A 12-point ROS negative except as in HPI    Current Outpatient Medications   Medication Sig Dispense Refill    albuterol (PROAIR HFA/PROVENTIL HFA/VENTOLIN HFA) 108 (90 Base) MCG/ACT inhaler Inhale 2 puffs into the lungs every 4 hours as needed for shortness of breath, wheezing or cough (every 4-6 hours)      budesonide (PULMICORT) 0.5 MG/2ML neb solution Take 0.5 mg by nebulization 2 times daily as needed (shortness of breath/ wheezing)      digoxin (LANOXIN) 125 MCG tablet TAKE 1 TABLET BY MOUTH EVERY OTHER DAY 45 tablet 3    diltiazem ER (TIAZAC) 120 MG 24 hr ER beaded capsule Take 120 mg by mouth every 12 hours (8:00 AM/8:30 PM)      furosemide (LASIX) 40 MG tablet Take 1 tablet by mouth once daily 90 tablet 0    gentamicin (GARAMYCIN) 0.3 % ophthalmic solution Place 2 drops into both eyes every 4 hours as needed (dry eyes)      ipratropium - albuterol 0.5 mg/2.5 mg/3 mL (DUONEB) 0.5-2.5 (3) MG/3ML neb solution Take 3 mLs by nebulization 2 times daily -and every 4 hours as needed.      losartan (COZAAR) 50 MG tablet Take 1 tablet by mouth once daily 90 tablet 3    meclizine (ANTIVERT) 25 MG tablet Take 25 mg by mouth as needed for dizziness      metFORMIN  (GLUCOPHAGE) 500 MG tablet Take 500 mg by mouth 2 times daily (with meals)      metoprolol tartrate (LOPRESSOR) 100 MG tablet Take 2 tablets by mouth twice daily 360 tablet 3    warfarin ANTICOAGULANT (COUMADIN) 5 MG tablet Take 1/2 tablet (2.5 mg) by mouth once daily in the evening on Monday. Take 1 tablet (5 mg) all other days or as directed by [facility] anticoagulation. Patient takes at 4:00 PM.         Allergies   Allergen Reactions    Panmycin [Tetracycline] Unknown     Listed at [facility].     Aspirin Palpitations     Heart palpitations-with high doses.      Immunization History   Administered Date(s) Administered    COVID-19 Monovalent 18+ (Moderna) 02/15/2021, 03/15/2021, 08/02/2022    Influenza (High Dose) 3 valent vaccine 01/17/2017, 10/12/2017, 11/06/2019    Influenza (IIV3) PF 12/01/2011    Influenza Vaccine 65+ (Fluzone HD) 09/16/2020, 09/28/2021, 10/21/2022, 11/28/2023    Influenza, seasonal, injectable, PF 10/01/2010    Pneumo Conj 13-V (2010&after) 01/17/2017    Pneumococcal 23 valent 10/01/2010, 01/26/2018    TDAP Vaccine (Adacel) 01/27/2017       Past Medical History:   Diagnosis Date    Atrial fibrillation with RVR (H) 02/22/2021    Benign essential hypertension 02/12/2021    Chronic diastolic heart failure (H) 02/12/2021    Elevated brain natriuretic peptide (BNP) level 03/31/2021    Goiter 03/31/2021    H/O hiatal hernia-moderate 03/31/2021    Left lower lobe pulmonary nodule 03/31/2021    Lower extremity edema 03/31/2021    Mixed hyperlipidemia 01/29/2024    New onset a-fib (H) 02/12/2021    On Coumadin for atrial fibrillation (H) 03/31/2021    Osteoarthritis of spine 03/31/2021    Persistent atrial fibrillation (H) 03/31/2021    Pneumonia due to infectious organism, unspecified laterality, unspecified part of lung 02/22/2021    Postmenopausal bleeding 01/29/2024    Prediabetes 01/29/2024    Simple chronic bronchitis (H) 02/12/2021    Stage 3 chronic kidney disease (H) 01/29/2024     Uncomplicated asthma        Past Surgical History:   Procedure Laterality Date    APPENDECTOMY      AS REMOVAL OF OVARY/TUBE(S) Left        SOCIAL HISTORY  History   Smoking Status    Never   Smokeless Tobacco    Never    Social History    Substance and Sexual Activity      Alcohol use: Not Currently     History   Drug Use Unknown       FAMILY HISTORY  Family History   Problem Relation Age of Onset    Heart Surgery Father     Diabetes Daughter     Coronary Artery Disease No family hx of     Hypertension No family hx of     Hyperlipidemia No family hx of     Breast Cancer No family hx of     Colon Cancer No family hx of     Prostate Cancer No family hx of     Cerebrovascular Disease No family hx of        PHYSICAL EXAMINATION  /64 (BP Location: Right arm, Patient Position: Right side, Cuff Size: Adult Regular)   Pulse 81   Temp 98.6  F (37  C)   Resp 15   Wt 76.2 kg (167 lb 15.9 oz)   SpO2 94%   BMI 27.11 kg/m    Wt Readings from Last 2 Encounters:   06/06/24 76.2 kg (167 lb 15.9 oz)   05/14/24 76.7 kg (169 lb 1.5 oz)     Physical Exam  Constitutional:       Appearance: Normal appearance.   Pulmonary:      Effort: Pulmonary effort is normal.      Breath sounds: Normal breath sounds.   Musculoskeletal:         General: No swelling.   Neurological:      Mental Status: She is alert.     Laboratory and Imaging:     Latest Reference Range & Units 06/06/24 14:25   WBC 4.0 - 11.0 10e3/uL 6.8   Hemoglobin 11.7 - 15.7 g/dL 11.7   Hematocrit 35.0 - 47.0 % 35.2   Platelet Count 150 - 450 10e3/uL 254       ASSESSMENT AND PLAN    Epithelioid leiomyosarcoma, uterine origin, stage IV    Initially presented to the ED with hemoptysis and hypoxia. Imaging done showed pulmonary masses, mediastinal and hilar adenopathy and adrenal nodule. Underwent bronchoscopy which showed endobronchial Left upper lobe mass. Biopsy was not possible because of oozing on warfarin. She then underwent a CT guided biopsy of lung masses.  Pathology showed high grade neoplasm. Focal keratin staining was positive. C-myc was strongly positive but NUT immunostains negative. Chromogranin and synaptophysin also negative.  was 29. CEA <2.     Staging workup done till now has showed a large hypermetabolic pelvic mass involving the right ovary and the uterus.  Also found to have possible metastasis involving pelvic lymph nodes on PET scan.  MRI brain was negative for cancer.    Foundation 1 is unrevealing for any particular cancer.  MSI is stable.  TMB is 0    Subsequent biopsy of left rib lesion showed epithelioid leiomyosarcoma very likely of uterine origin.    We discussed recommendation for treatment today.  For first-line treatment I would recommend treatment with gemcitabine and docetaxel with dose reduction given her age and performance status.    Expected side effects which include but not limited to nasuea, vomiting, diarrhea, low blood counts, infection, kidney or liver toxicity, allergic reactions, neuropathy, skin changes and swelling.    She is agreeable.  Will plan to proceed with gemcitabine at 700 mg/m  day 1 and 8 and docetaxel 60 mg/m  at day 8.  Will also add Neulasta.  Adjust medication dose for toxicity.  We will hold off on port placement currently.    Plan to restage after 3 cycles.    Will follow her closely while she is on chemo.  Will see her prior to day 8 of chemotherapy.     Follow up in clinic per treatment schedule.    Total time spent on the patient on day of encounter was 86 minutes doing chart review, outside records, reviewing scans, review of test results, interpretation of results, patient visit and documentation.       Celia Bruce MD

## 2024-06-06 NOTE — PATIENT INSTRUCTIONS
Your pathology showed epithelioid leiomyosarcoma. This very likely started in the uterus. I would recommend treatment with gemcitabine and Docetaxel.

## 2024-06-06 NOTE — NURSING NOTE
"Oncology Rooming Note    June 6, 2024 1:36 PM   Kerri Gonsales is a 79 year old female who presents for:    Chief Complaint   Patient presents with    Oncology Clinic Visit     Initial Vitals: /64 (BP Location: Right arm, Patient Position: Right side, Cuff Size: Adult Regular)   Pulse 81   Temp 98.6  F (37  C)   Resp 15   Wt 76.2 kg (167 lb 15.9 oz)   SpO2 94%   BMI 27.11 kg/m   Estimated body mass index is 27.11 kg/m  as calculated from the following:    Height as of 5/7/24: 1.676 m (5' 6\").    Weight as of this encounter: 76.2 kg (167 lb 15.9 oz). Body surface area is 1.88 meters squared.  No Pain (0) Comment: Data Unavailable   No LMP recorded. Patient is postmenopausal.  Allergies reviewed: Yes  Medications reviewed: Yes    Medications: Medication refills not needed today.  Pharmacy name entered into [employer] [facility]: [facility] [address] [facility] - [address]     Frailty Screening:   Is the patient here for a new oncology consult visit in cancer care? 2. No      Clinical concerns: Just wanting to discuss results of tests   Dr. Bruce  was notified.      Laura Kumar RN             "

## 2024-06-07 NOTE — PROGRESS NOTES
[facility]: Cancer Care                                                                                        TC to patient to discuss the plan of care. Discussed chemo education. She agrees to chemo ed on 6-14-24 and will have a calendar for her when she comes for chemo education.      Signature:  Leticia Longoria RN

## 2024-06-13 NOTE — PROGRESS NOTES
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    She presents to the clinic today accompanied by her granddaughter.  She admits that she is somewhat overwhelmed with her diagnosis.  She states \"will take it a day at a time\".    Oncologic History:     Ms. Larkin initially presented with hemoptysis and hypoxia to the [place] ED      3/29/2024: CT chest with contrast:Findings most consistent with metastatic malignant neoplasm with  numerous nodules/masses throughout the parenchyma both lungs as well  as mediastinal and hilar lymphadenopathy. Interval enlargement of a 6.3 cm mass involving the left lobe of  thyroid gland. New 1.7 x 1.2 cm right adrenal gland nodule, also concerning for  metastatic disease. No distinct evidence of pulmonary embolus..     **She was then transferred to [facility]. Underwent Bronchoscopy and was found to have a left upper lobe endobronchial tumor. Biopsy was not possible.     3/29/2024  She underwent CT guided biopsy of lung nodules with pathology showing:  Left upper lobe, needle biopsy: High-grade malignant neoplasm.  CK7 negative, CK20 negative, CK8/18, rare positive cells, TTF-1 1 negative, Napsin A negative, p40 negative,  negative, estrogen receptor negative, CDX2 negative, S100 negative, CD20 negative, CD3 negative CD5 negative CD10 negative, BCL-6 positive, mom-1 negative, kappa and lambda JADA negative, EBV JADA negative C-MYC strongly positive, Bcl-2 negative, CD4 negative,  negative CD56 strongly positive, " CD43 highlights B cells negative, CD7 negative, CD68 negative     4/12/2024: Pathology consult: [facility] laboratories: Lung, left upper lobe, needle biopsy: High-grade malignant neoplasm.  Immunostains were performed on block A1 and the tumor cells were negative for any UT.  Chromogranin and synaptophysin were negative; Focal staining for keratin was not sufficient for diagnosis of carcinoma. NUT immunostain negative    **4/12/2024: CT chest with contrast: No acute pulmonary emboli to the subsegmental level.   Minimal progression of diffuse pulmonary, mediastinal and hilar masses when compared to the prior dated 3/29/2024.     4/14/2024   She then again presented to the emergency room at [place] and found to be hypoxic and was again transferred to [facility].       4/29/2024: Ultrasound thyroid: Right lobe of the thyroid measures 4.6 x 1.8 x 1.5 cm. Left lobe of the thyroid measures 7.5 x 2.2 x 2.1 cm. Isthmus measures 7 mm.  There is a large mass in the mid and lower pole of the left lobe of  the thyroid. This measures 5.6 x 4.6 x 4.8 cm in size. This is solid,  heterogeneous in echotexture with hyperechoic and isoechoic areas,  tolerated than wide with poorly visualized margins. There are probably  some internal calcifications. This is a TI-RAD 5 lesion and FNA is  recommended. There is also a small 4 x 4 by 2 mm hyperechoic lesion in the right right side of the isthmus. No FNA is necessary for this lesion.     5/3/2024: CT abdomen and pelvis with contrast: Widespread metastatic disease, similar to that seen on the  recent prior study. Widespread pulmonary metastases including a destructive expansile osteolytic mass involving the anterior left sixth rib. This rib lesion would be amenable to percutaneous biopsy if further testing is clinically warranted.   Probable metastasis to the right adrenal gland.   Large suspicious pelvic mass possibly uterine or ovarian in origin. If  the primary site is otherwise  unknown, this would be a candidate.     5/3/2024: MR brain with and without contrast: Small hemorrhagic infarct versus cavernous venous  malformation of the left cerebellar hemisphere without significant mass effect. Small subacute to late subacute infarct of the left temporal occipital region.     5/8/2024: PET scan: Widespread pulmonary metastatic disease.There is osseous metastatic disease involving the left anterior sixth rib, left proximal humerus and possibly the left maxillary sinus.  Large hypermetabolic pelvic mass involving the right ovary and/or  uterus. There are at least two small metastases involving left pelvic  lymph nodes.     5/21/2024: Rib, left, lesion CT-guided needle core biopsy:Malignant neoplasm, most consistent with epithelioid leiomyosarcoma (see comment): positive for p16, vimentin, smooth muscle actin, calponin, WT1 (60%) and EMA (5-10%)     Treatment Goal: Palliative    Planned TX:  Gemcitabine 700mg/m2 day 1 and day 8 and docetaxel 60 mg per metered squared given day 8 with Neulasta support 6 mg subcutaneous via on body injector    Past Medical History:   Diagnosis Date    Atrial fibrillation with RVR (H) 02/22/2021    Benign essential hypertension 02/12/2021    Chronic diastolic heart failure (H) 02/12/2021    Elevated brain natriuretic peptide (BNP) level 03/31/2021    Goiter 03/31/2021    H/O hiatal hernia-moderate 03/31/2021    Left lower lobe pulmonary nodule 03/31/2021    Lower extremity edema 03/31/2021    Mixed hyperlipidemia 01/29/2024    New onset a-fib (H) 02/12/2021    On Coumadin for atrial fibrillation (H) 03/31/2021    Osteoarthritis of spine 03/31/2021    Persistent atrial fibrillation (H) 03/31/2021    Pneumonia due to infectious organism, unspecified laterality, unspecified part of lung 02/22/2021    Postmenopausal bleeding 01/29/2024    Prediabetes 01/29/2024    Simple chronic bronchitis (H) 02/12/2021    Stage 3 chronic kidney disease (H) 01/29/2024    Uncomplicated  asthma        Past Surgical History:   Procedure Laterality Date    APPENDECTOMY      AS REMOVAL OF OVARY/TUBE(S) Left        Family History   Problem Relation Age of Onset    Heart Surgery Father     Diabetes Daughter     Coronary Artery Disease No family hx of     Hypertension No family hx of     Hyperlipidemia No family hx of     Breast Cancer No family hx of     Colon Cancer No family hx of     Prostate Cancer No family hx of     Cerebrovascular Disease No family hx of        Social History     Socioeconomic History    Marital status:      Spouse name: Not on file    Number of children: Not on file    Years of education: Not on file    Highest education level: Not on file   Occupational History    Not on file   Tobacco Use    Smoking status: Never    Smokeless tobacco: Never   Substance and Sexual Activity    Alcohol use: Not Currently    Drug use: Never    Sexual activity: Not Currently     Partners: Male   Other Topics Concern    Parent/sibling w/ CABG, MI or angioplasty before 65F 55M? No   Social History Narrative    Not on file     Social Determinants of Health     Financial Resource Strain: Not on file   Food Insecurity: Not on file   Transportation Needs: Not on file   Physical Activity: Not on file   Stress: Not on file   Social Connections: Not on file   Interpersonal Safety: Not on file   Housing Stability: Not on file       Current Outpatient Medications   Medication Sig Dispense Refill    albuterol (PROAIR HFA/PROVENTIL HFA/VENTOLIN HFA) 108 (90 Base) MCG/ACT inhaler Inhale 2 puffs into the lungs every 4 hours as needed for shortness of breath, wheezing or cough (every 4-6 hours)      budesonide (PULMICORT) 0.5 MG/2ML neb solution Take 0.5 mg by nebulization 2 times daily as needed (shortness of breath/ wheezing)      digoxin (LANOXIN) 125 MCG tablet TAKE 1 TABLET BY MOUTH EVERY OTHER DAY 45 tablet 3    diltiazem ER (TIAZAC) 120 MG 24 hr ER beaded capsule Take 120 mg by mouth every 12 hours  (8:00 AM/8:30 PM)      furosemide (LASIX) 40 MG tablet Take 1 tablet by mouth once daily 90 tablet 0    guaiFENesin (ROBITUSSIN) 20 mg/mL liquid Take 200 mg by mouth every 4 hours as needed for cough Safe Tussin      ipratropium - albuterol 0.5 mg/2.5 mg/3 mL (DUONEB) 0.5-2.5 (3) MG/3ML neb solution Take 3 mLs by nebulization 2 times daily -and every 4 hours as needed.      losartan (COZAAR) 50 MG tablet Take 1 tablet by mouth once daily 90 tablet 3    metFORMIN (GLUCOPHAGE) 500 MG tablet Take 500 mg by mouth 2 times daily (with meals)      metoprolol tartrate (LOPRESSOR) 100 MG tablet Take 2 tablets by mouth twice daily 360 tablet 3    nystatin (MYCOSTATIN) 852117 UNIT/GM external cream Apply topically daily as needed      warfarin ANTICOAGULANT (COUMADIN) 5 MG tablet Take 1/2 tablet (2.5 mg) by mouth once daily in the evening on Monday and Friday. Take 1 tablet (5 mg) all other days or as directed by [facility] anticoagulation. Patient takes at 4:00 PM.      gentamicin (GARAMYCIN) 0.3 % ophthalmic solution Place 2 drops into both eyes every 4 hours as needed (dry eyes) (Patient not taking: Reported on 6/14/2024)      meclizine (ANTIVERT) 25 MG tablet Take 25 mg by mouth as needed for dizziness (Patient not taking: Reported on 6/14/2024)       No current facility-administered medications for this visit.        Allergies   Allergen Reactions    Panmycin [Tetracycline] Unknown     Listed at [facility].     Aspirin Palpitations     Heart palpitations-with high doses.        Review Of Systems:  Constitutional:    denies fever, chills, and drenching night sweats. Weight is stable.  Eyes:    denies blurred or double vision  Ears/Nose/Throat:   denies ear pain, nose problems, difficulty swallowing  Respiratory:   reports SOB and cough-non-productive  Skin:   denies rash, lesions  Cardiovascular:   denies chest pain, has palpitations and has At Fib; mild swelling to feet and ankles  Gastrointestinal:   denies abdominal pain,  "bloating, nausea, early satiety; no change in bowel habits or blood in stool  Genitourinary:   denies difficulty with urination, blood in urine  Musculoskeletal:    denies new muscle pain, bone pain  Neurologic:   denies lightheadedness, frequent headaches, numbness or tingling  Psychiatric:   denies anxiety, depression  Hematologic/Lymphatic/Immunologic:   denies easy bruising, easy bleeding, new lumps or bumps noted  Endocrine:   Denies increased thirst or dry mouth    Fatigue (0=no fatigue; 10=worst fatigue imaginable): 5    Pain  (0=no pain; 10=worst pain imaginable): 0    ECOG Performance Status: 2    Physical Exam:  BP (!) 140/80   Pulse 81   Temp 98.3  F (36.8  C) (Tympanic)   Resp 20   Ht 1.626 m (5' 4\")   Wt 74.9 kg (165 lb 2 oz)   SpO2 91%   BMI 28.34 kg/m      GENERAL APPEARANCE: alert and in no acute distress.  HEENT: Normocephalic, Sclerae anicteric.   NECK:   No asymmetry   RESP: Lungs clear to auscultation bilaterally; however dim throughout, respirations regular and easy at rest  CARDIOVASCULAR:  irregular rate and rhythm. Normal S1, S2  ABDOMEN: Soft, nontender. Bowel sounds auscultated all 4 quadrants. No palpable organomegaly or masses.  NEURO: Alert and oriented x 3.   presents in wheelchair  PSYCHIATRIC: Mentation and affect appear normal.  Mood appropriate; teary-eyed at times throughout visit    Laboratory:   None completed for today's visit    Imaging Studies: None completed for today's visit    Discussion:    Nutrition: To be discussed  Cancer Rehab: Discussed-declined     **Any Physical Limitations to exercise?  Age  Palliative Care Program: Discussed  Health Care Directive: To be discussed  Pharmacy consult review: Discussed; awaiting docetaxel review  Importance of Central line care (port) or IV site care.  Discussed; discussed possible need for Port-A-Cath or PICC line    Duration:  Cycles and rationale for strict adherence, specific supportive medication Compazine, dexamethasone, " "Emla cream along with chemotherapy medications including gemcitabine and docetaxel and side effects (including long-term and short-term) and management; skin changes/hand-foot syndrome, anemia, neutropenia, thrombocytopenia, diarrhea/constipation, hair loss syndrome, memory changes/ \"chemobrain\", mouth sores, taste changes, neuropathy, fatigue, infertility, myelosuppression, and risk of extravasation or infiltration.    Infection prevention and monitoring of lab values, what lab tests and what changes of these values meant, along with the possibility of hydration or blood product transfusion, or the need to defer or hold treatment.      General Chemotherapy Information, including ways it is excreted from the body and cleaning and containment of vomitus or other bodily fluid, use of the bathroom, sexual health and intimacy, what to do if needing to miss a treatment, when to call a provider and the need for staff to wear protective equipment.    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  Plan is to initiate gemcitabine next week.  Chemo education completed and patient does want to go forth with therapy.  She will return next week to initiate cycle 1 day 1 and she will follow-up for evaluation prior to cycle 1 day 8 for status check.    #2 back pain: Patient describes excruciating pain when she is having any type of procedure done where she needs to lie down.  We discussed premedicating with pain medicine/muscle relaxer prior to imaging.  She was grateful for the discussion.    I encouraged patient to call with any questions or concerns.    100 " minutes spent in the patient's encounter today with time spent in review of the chart along with in chart preparation.  Time was spent in review of the treamtent plan.  Time was also spent obtaining a review of systems and performing a physical exam.  Time was spent in review of all planned medications for treatment with the patient and questions answered.  Time was spent in discussion of chemotherapy and side effects along with recommendations for management.  Time was spent in reviewing her calendar and plan for follow-up and time was also spent providing active listening in regards to diagnosis and upcoming treatment.  Time was spent in placing orders and in chart documentation.    Sana Varela, GASPER LEMUSN, FNP-BC, AOCNP

## 2024-06-13 NOTE — PROGRESS NOTES
[facility] Chemotherapy Consult    The inpatient pharmacist has been consulted to review the patient's chart for  the following: any significant drug interactions between home medications, new take home medications, pre-medications, PRN medications, chemotherapy agents, and chemotherapy regimen.     Current Outpatient Medications   Medication Sig Dispense Refill    albuterol (PROAIR HFA/PROVENTIL HFA/VENTOLIN HFA) 108 (90 Base) MCG/ACT inhaler Inhale 2 puffs into the lungs every 4 hours as needed for shortness of breath, wheezing or cough (every 4-6 hours)      budesonide (PULMICORT) 0.5 MG/2ML neb solution Take 0.5 mg by nebulization 2 times daily as needed (shortness of breath/ wheezing)      digoxin (LANOXIN) 125 MCG tablet TAKE 1 TABLET BY MOUTH EVERY OTHER DAY 45 tablet 3    diltiazem ER (TIAZAC) 120 MG 24 hr ER beaded capsule Take 120 mg by mouth every 12 hours (8:00 AM/8:30 PM)      furosemide (LASIX) 40 MG tablet Take 1 tablet by mouth once daily 90 tablet 0    gentamicin (GARAMYCIN) 0.3 % ophthalmic solution Place 2 drops into both eyes every 4 hours as needed (dry eyes)      ipratropium - albuterol 0.5 mg/2.5 mg/3 mL (DUONEB) 0.5-2.5 (3) MG/3ML neb solution Take 3 mLs by nebulization 2 times daily -and every 4 hours as needed.      losartan (COZAAR) 50 MG tablet Take 1 tablet by mouth once daily 90 tablet 3    meclizine (ANTIVERT) 25 MG tablet Take 25 mg by mouth as needed for dizziness      metFORMIN (GLUCOPHAGE) 500 MG tablet Take 500 mg by mouth 2 times daily (with meals)      metoprolol tartrate (LOPRESSOR) 100 MG tablet Take 2 tablets by mouth twice daily 360 tablet 3    warfarin ANTICOAGULANT (COUMADIN) 5 MG tablet Take 1/2 tablet (2.5 mg) by mouth once daily in the evening on Monday. Take 1 tablet (5 mg) all other days or as directed by [facility] anticoagulation. Patient takes at 4:00 PM.       No current facility-administered medications for this visit.       Take home medication(s):  Compazine, Decadron    Pre-Treat(s): Zofran    PRN medication(s): Ativan, Pepcid    Chemotherapy agent(s): gemcitabine    Cancer Being Treated: leiomyosarcoma    The following interactions were found and will require patient education and/or provider assessment:     Drug-Drug interactions: Concurrent use of GEMCITABINE and WARFARIN may result in an increased risk of bleeding.     Drug-Allergy interactions:  None    Drug-Food interactions: Concurrent use of WARFARIN and CRANBERRY JUICE and/or POMEGRANATE JUICE may result in an increased risk of bleeding.     Drug-Ethanol interactions: None    Drug-Tobacco interactions: Concurrent use of ZOFRAN and TOBACCO may result in decreased ZOFRAN exposure.       If there are any additional questions or concerns, please contact the inpatient pharmacy ([phone]) for further review.     Rancho Garcia RPH  June 12, 2024

## 2024-06-14 NOTE — NURSING NOTE
"Oncology Rooming Note    June 14, 2024 1:54 PM   Kerri Gonsales is a 79 year old female who presents for:    Chief Complaint   Patient presents with    Oncology Clinic Visit     Follow up Malignant neoplasm of upper lobe, left bronchus or lung - chemo education     Initial Vitals: BP (!) 140/80   Pulse 81   Temp 98.3  F (36.8  C) (Tympanic)   Resp 20   Ht 1.626 m (5' 4\")   Wt 74.9 kg (165 lb 2 oz)   SpO2 91%   BMI 28.34 kg/m   Estimated body mass index is 28.34 kg/m  as calculated from the following:    Height as of this encounter: 1.626 m (5' 4\").    Weight as of this encounter: 74.9 kg (165 lb 2 oz). Body surface area is 1.84 meters squared.  No Pain (0) Comment: Data Unavailable   No LMP recorded. Patient is postmenopausal.  Allergies reviewed: Yes  Medications reviewed: Yes    Medications: Medication refills not needed today.  Pharmacy name entered into [facility]: [facility] [id number] [id number] [address]    Frailty Screening:   Is the patient here for a new oncology consult visit in cancer care? 2. No            Becky Jackson LPN             "

## 2024-06-14 NOTE — PROGRESS NOTES
[facility] Chemotherapy Consult     The inpatient pharmacist has been consulted to review the patient's chart for  the following: any significant drug interactions between home medications, new take home medications, pre-medications, PRN medications, chemotherapy agents, and chemotherapy regimen.      Current Facility Medications          Current Outpatient Medications   Medication Sig Dispense Refill    albuterol (PROAIR HFA/PROVENTIL HFA/VENTOLIN HFA) 108 (90 Base) MCG/ACT inhaler Inhale 2 puffs into the lungs every 4 hours as needed for shortness of breath, wheezing or cough (every 4-6 hours)        budesonide (PULMICORT) 0.5 MG/2ML neb solution Take 0.5 mg by nebulization 2 times daily as needed (shortness of breath/ wheezing)        digoxin (LANOXIN) 125 MCG tablet TAKE 1 TABLET BY MOUTH EVERY OTHER DAY 45 tablet 3    diltiazem ER (TIAZAC) 120 MG 24 hr ER beaded capsule Take 120 mg by mouth every 12 hours (8:00 AM/8:30 PM)        furosemide (LASIX) 40 MG tablet Take 1 tablet by mouth once daily 90 tablet 0    gentamicin (GARAMYCIN) 0.3 % ophthalmic solution Place 2 drops into both eyes every 4 hours as needed (dry eyes)        ipratropium - albuterol 0.5 mg/2.5 mg/3 mL (DUONEB) 0.5-2.5 (3) MG/3ML neb solution Take 3 mLs by nebulization 2 times daily -and every 4 hours as needed.        losartan (COZAAR) 50 MG tablet Take 1 tablet by mouth once daily 90 tablet 3    meclizine (ANTIVERT) 25 MG tablet Take 25 mg by mouth as needed for dizziness        metFORMIN (GLUCOPHAGE) 500 MG tablet Take 500 mg by mouth 2 times daily (with meals)        metoprolol tartrate (LOPRESSOR) 100 MG tablet Take 2 tablets by mouth twice daily 360 tablet 3    warfarin ANTICOAGULANT (COUMADIN) 5 MG tablet Take 1/2 tablet (2.5 mg) by mouth once daily in the evening on Monday. Take 1 tablet (5 mg) all other days or as directed by [facility] anticoagulation. Patient takes at 4:00 PM.           No current facility-administered medications for this visit.            Take home medication(s): Compazine, Decadron     Pre-Treat(s): Zofran     PRN medication(s): Ativan, Pepcid     Chemotherapy agent(s): gemcitabine, docetaxel     Cancer Being Treated: leiomyosarcoma     The following interactions were found and will require patient education and/or provider assessment:      Drug-Drug interactions: Concurrent use of GEMCITABINE and WARFARIN may result in an increased risk of bleeding.     Drug-Allergy interactions:  None     Drug-Food interactions: Concurrent use of WARFARIN and CRANBERRY JUICE and/or POMEGRANATE JUICE may result in an increased risk of bleeding.      Concurrent use of DOCETAXEL and GRAPEFRUIT JUICE may result in increased docetaxel exposure.     Drug-Ethanol interactions: None     Drug-Tobacco interactions: Concurrent use of ZOFRAN and TOBACCO may result in decreased ZOFRAN exposure.         If there are any additional questions or concerns, please contact the inpatient pharmacy ([phone]) for further review.      Rancho Garcia RPH  June 12, 2024

## 2024-06-17 NOTE — DISCHARGE INSTRUCTIONS
It is very important that you follow-up with your primary care physician in the next 1 to 2 days for recheck.    Increase your Lasix to 40 mg twice a day for the next few days.    Your symptoms are multifactorial and secondary to likely your persistent atrial fibrillation, history of heart failure that seems to be slightly worse, as well as the advanced cancer now in your lungs.    Return here for any other questions or concerns.

## 2024-06-17 NOTE — ED NOTES
Patient back from imaging. Denies pain. Watching tv. Cardiac monitoring continues. Call light in reach.

## 2024-06-17 NOTE — ED PROVIDER NOTES
History     Chief Complaint   Patient presents with    Shortness of Breath     The history is provided by the patient.     Kerri Gonsales is a 79 year old female who presented to the emergency department for evaluation of shortness of breath.  Chronic but seems to be worsening over the last few days.  Mild nonproductive cough.  No hemoptysis.  Currently on Coumadin secondary to atrial fibrillation.  She has known metastatic cancer and is post start chemotherapy in 2 days.    Allergies:  Allergies   Allergen Reactions    Panmycin [Tetracycline] Unknown     Listed at [facility].     Aspirin Palpitations     Heart palpitations-with high doses.        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) 04/25/2024     Priority: Medium    Malignant neoplasm of upper lobe, left bronchus or lung (H) 04/25/2024     Priority: Medium    Stage 3 chronic kidney disease (H) 01/29/2024     Priority: Medium    Postmenopausal bleeding 01/29/2024     Priority: Medium    Prediabetes 01/29/2024     Priority: Medium    Mixed hyperlipidemia 01/29/2024     Priority: Medium    Encounter for monitoring digoxin therapy 06/26/2023     Priority: Medium    Persistent atrial fibrillation (H) 03/31/2021     Priority: Medium    Elevated brain natriuretic peptide (BNP) level 03/31/2021     Priority: Medium    Goiter 03/31/2021     Priority: Medium    H/O hiatal hernia-moderate 03/31/2021     Priority: Medium    Left lower lobe pulmonary nodule 03/31/2021     Priority: Medium    Osteoarthritis of spine 03/31/2021     Priority: Medium    Lower extremity edema 03/31/2021     Priority: Medium    On Coumadin for atrial fibrillation (H) 03/31/2021     Priority: Medium    Atrial fibrillation with RVR (H)  02/22/2021     Priority: Medium    Hypokalemia 02/12/2021     Priority: Medium    Hypomagnesemia 02/12/2021     Priority: Medium    New onset a-fib (H) 02/12/2021     Priority: Medium    Chronic diastolic heart failure (H) 02/12/2021     Priority: Medium    Simple chronic bronchitis (H) 02/12/2021     Priority: Medium    Benign essential hypertension 02/12/2021     Priority: Medium        Past Medical History:    Past Medical History:   Diagnosis Date    Atrial fibrillation with RVR (H) 02/22/2021    Benign essential hypertension 02/12/2021    Chronic diastolic heart failure (H) 02/12/2021    Elevated brain natriuretic peptide (BNP) level 03/31/2021    Goiter 03/31/2021    H/O hiatal hernia-moderate 03/31/2021    Left lower lobe pulmonary nodule 03/31/2021    Lower extremity edema 03/31/2021    Mixed hyperlipidemia 01/29/2024    New onset a-fib (H) 02/12/2021    On Coumadin for atrial fibrillation (H) 03/31/2021    Osteoarthritis of spine 03/31/2021    Persistent atrial fibrillation (H) 03/31/2021    Pneumonia due to infectious organism, unspecified laterality, unspecified part of lung 02/22/2021    Postmenopausal bleeding 01/29/2024    Prediabetes 01/29/2024    Simple chronic bronchitis (H) 02/12/2021    Stage 3 chronic kidney disease (H) 01/29/2024    Uncomplicated asthma        Past Surgical History:    Past Surgical History:   Procedure Laterality Date    APPENDECTOMY      AS REMOVAL OF OVARY/TUBE(S) Left        Family History:    Family History   Problem Relation Age of Onset    Heart Surgery Father     Diabetes Daughter     Coronary Artery Disease No family hx of     Hypertension No family hx of     Hyperlipidemia No family hx of     Breast Cancer No family hx of     Colon Cancer No family hx of     Prostate Cancer No family hx of     Cerebrovascular Disease No family hx of        Social History:  Marital Status:   [4]  Social History     Tobacco Use    Smoking status: Never    Smokeless tobacco: Never  "  Substance Use Topics    Alcohol use: Not Currently    Drug use: Never        Medications:    budesonide (PULMICORT) 0.5 MG/2ML neb solution  digoxin (LANOXIN) 125 MCG tablet  diltiazem ER (TIAZAC) 120 MG 24 hr ER beaded capsule  furosemide (LASIX) 40 MG tablet  guaiFENesin (ROBITUSSIN) 20 mg/mL liquid  ipratropium - albuterol 0.5 mg/2.5 mg/3 mL (DUONEB) 0.5-2.5 (3) MG/3ML neb solution  losartan (COZAAR) 50 MG tablet  metFORMIN (GLUCOPHAGE) 500 MG tablet  metoprolol tartrate (LOPRESSOR) 100 MG tablet  warfarin ANTICOAGULANT (COUMADIN) 5 MG tablet  albuterol (PROAIR HFA/PROVENTIL HFA/VENTOLIN HFA) 108 (90 Base) MCG/ACT inhaler  gentamicin (GARAMYCIN) 0.3 % ophthalmic solution  meclizine (ANTIVERT) 25 MG tablet  nystatin (MYCOSTATIN) 951391 UNIT/GM external cream  [START ON 6/19/2024] prochlorperazine (COMPAZINE) 10 MG tablet          Review of Systems   Constitutional:  Positive for activity change and fatigue.   Respiratory:  Positive for cough and shortness of breath.    Gastrointestinal:  Negative for abdominal pain.   Genitourinary: Negative.    Skin: Negative.    Neurological:  Positive for weakness. Negative for dizziness.   Hematological:  Bruises/bleeds easily.       Physical Exam   BP: 114/58  Pulse: 83  Temp: 97.5  F (36.4  C)  Resp: 16  Height: 162.6 cm (5' 4\")  Weight: 74.8 kg (164 lb 14.5 oz)  SpO2: 92 %      Physical Exam  Vitals and nursing note reviewed.   Constitutional:       General: She is not in acute distress.     Appearance: Normal appearance. She is normal weight. She is not ill-appearing, toxic-appearing or diaphoretic.      Comments: Pleasant and talkative 79-year-old female found semireclined in no distress   Cardiovascular:      Rate and Rhythm: Normal rate. Rhythm irregular.   Pulmonary:      Effort: Pulmonary effort is normal.      Comments: Decreased breath sounds bilaterally.  She has no evidence of tachypnea, increased work of breathing, or respiratory distress.  Abdominal:      " Palpations: Abdomen is soft.      Tenderness: There is no abdominal tenderness.   Musculoskeletal:      Right lower leg: No edema.      Left lower leg: No edema.   Skin:     General: Skin is warm and dry.      Capillary Refill: Capillary refill takes less than 2 seconds.   Neurological:      General: No focal deficit present.      Mental Status: She is alert and oriented to person, place, and time.   Psychiatric:         Mood and Affect: Mood normal.         ED Course     ED Course as of 06/17/24 1429   Mon Jun 17, 2024   1253 My independent review of the chest x-ray shows widespread lesions   1308 My independent review of the EKG shows atrial fibrillation with PVC.  Sagging ST segments in the lateral leads that are unchanged from previous EKG.  Ventricular rate of 73.  Normal axis.   1343 Symptoms are almost certainly multifactorial but most likely secondary to advancing metastatic disease and small pleural effusions.  Low concern for pulmonary embolism.  Low concern for ACS.   1412 Troponin T, High Sensitivity(!): 22  No significant change   1412 N-Terminal Pro BNP Inpatient(!): 4,033  Elevated from previous     Procedures              Critical Care time:  none               Results for orders placed or performed during the hospital encounter of 06/17/24 (from the past 24 hour(s))   XR Chest 2 Views    Narrative    PROCEDURE:  XR CHEST 2 VIEWS    HISTORY: Shortness of breath, .    COMPARISON:  4/12/2024    FINDINGS:  The cardiomediastinal contours are partially obscured. The trachea is  midline.  Moderate left and small right pleural effusions are new. Pulmonary  metastatic disease is worse when compared to 4/12/2024.    Osteopenia or osteoporosis. No subdiaphragmatic free air.      Impression    IMPRESSION:      Progressive pulmonary metastatic disease. Left greater than right  pleural effusions.      EUSEBIA CUNHA MD         SYSTEM ID:  [id number]   UA with Microscopic reflex to Culture    Specimen: Urine,  Midstream   Result Value Ref Range    Color Urine Straw Colorless, Straw, Light Yellow, Yellow    Appearance Urine Clear Clear    Glucose Urine Negative Negative mg/dL    Bilirubin Urine Negative Negative    Ketones Urine Negative Negative mg/dL    Specific Gravity Urine 1.006 1.003 - 1.035    Blood Urine Trace (A) Negative    pH Urine 6.0 4.7 - 8.0    Protein Albumin Urine Negative Negative mg/dL    Urobilinogen Urine Normal Normal, 2.0 mg/dL    Nitrite Urine Negative Negative    Leukocyte Esterase Urine Negative Negative    Mucus Urine Present (A) None Seen /LPF    RBC Urine <1 <=2 /HPF    WBC Urine <1 <=5 /HPF    Squamous Epithelials Urine 0 <=1 /HPF    Hyaline Casts Urine 14 (H) <=2 /LPF    Narrative    Urine Culture not indicated   CBC with platelets differential    Narrative    The following orders were created for panel order CBC with platelets differential.  Procedure                               Abnormality         Status                     ---------                               -----------         ------                     CBC with platelets and d...[[id number]]                      Final result                 Please view results for these tests on the individual orders.   Basic metabolic panel   Result Value Ref Range    Sodium 136 135 - 145 mmol/L    Potassium 4.2 3.4 - 5.3 mmol/L    Chloride 92 (L) 98 - 107 mmol/L    Carbon Dioxide (CO2) 28 22 - 29 mmol/L    Anion Gap 16 (H) 7 - 15 mmol/L    Urea Nitrogen 17.8 8.0 - 23.0 mg/dL    Creatinine 0.97 (H) 0.51 - 0.95 mg/dL    GFR Estimate 58 (L) >60 mL/min/1.73m2    Calcium 9.4 8.8 - 10.2 mg/dL    Glucose 157 (H) 70 - 99 mg/dL   INR   Result Value Ref Range    INR 3.38 (H) 0.85 - 1.15   Troponin T, High Sensitivity   Result Value Ref Range    Troponin T, High Sensitivity 22 (H) <=14 ng/L   Nt probnp inpatient (BNP)   Result Value Ref Range    N terminal Pro BNP Inpatient 4,033 (H) 0 - 1,800 pg/mL   CBC with platelets and differential   Result Value Ref  Range    WBC Count 7.2 4.0 - 11.0 10e3/uL    RBC Count 4.00 3.80 - 5.20 10e6/uL    Hemoglobin 12.4 11.7 - 15.7 g/dL    Hematocrit 37.0 35.0 - 47.0 %    MCV 93 78 - 100 fL    MCH 31.0 26.5 - 33.0 pg    MCHC 33.5 31.5 - 36.5 g/dL    RDW 14.6 10.0 - 15.0 %    Platelet Count 252 150 - 450 10e3/uL    % Neutrophils 71 %    % Lymphocytes 19 %    % Monocytes 8 %    % Eosinophils 1 %    % Basophils 0 %    % Immature Granulocytes 0 %    NRBCs per 100 WBC 0 <1 /100    Absolute Neutrophils 5.1 1.6 - 8.3 10e3/uL    Absolute Lymphocytes 1.4 0.8 - 5.3 10e3/uL    Absolute Monocytes 0.6 0.0 - 1.3 10e3/uL    Absolute Eosinophils 0.1 0.0 - 0.7 10e3/uL    Absolute Basophils 0.0 0.0 - 0.2 10e3/uL    Absolute Immature Granulocytes 0.0 <=0.4 10e3/uL    Absolute NRBCs 0.0 10e3/uL   Extra Tube    Narrative    The following orders were created for panel order Extra Tube.  Procedure                               Abnormality         Status                     ---------                               -----------         ------                     Extra Red Top Tube[[id number]]                               In process                 Extra Heparinized Syringe[[id number]]                        In process                   Please view results for these tests on the individual orders.       Medications   ipratropium - albuterol 0.5 mg/2.5 mg/3 mL (DUONEB) neb solution 3 mL (3 mLs Nebulization $Given 6/17/24 7149)       Assessments & Plan (with Medical Decision Making)   This is an 79-year-old female who presented to the emergency department for evaluation of persistent shortness of breath.  Broad differential was considered in the emergency department.  Broad workup.  Symptoms are obviously multifactorial.  Low concern over acute coronary syndrome.  Likely has mild worsening of her CHF as well as the obvious worsening of her metastatic cancer with new lesions just on simple chest x-ray over the last few months.  Her vital signs are within normal  limits at this time.  She has no significant tachycardia.  Her oxygen saturation is acceptable on room air.  She has no objective evidence of shortness of breath.  We did have a long detailed discussion regarding her findings and she is apparently having reservations about even starting chemotherapy.  This would be a good discussion with her oncology team.  Outpatient echocardiogram was ordered.  Strict return precautions were provided.  I am going to have her increase her Lasix to 40 mg twice a day for the next 3 days.    Ms. Gonsales has also agreed to schedule a follow up appointment with her primary provider and oncologist for re-evaluation and further management. She verbalized an understanding of the results of our workup and agree with the complete discharge plan including instructions for return and follow up.  There is no indication for further investigation or treatment on an emergent basis.  There is no reasonably foreseeable injury that would be associated with discharge and close outpatient follow-up.     This document was prepared using a combination of typing and voice generated software.  While every attempt was made for accuracy, spelling and grammatical errors may exist.      I have reviewed the nursing notes.    I have reviewed the findings, diagnosis, plan and need for follow up with the patient.           Medical Decision Making  The patient's presentation was of moderate complexity (an undiagnosed new problem with uncertain prognosis).    The patient's evaluation involved:  review of 1 test result(s) ordered prior to this encounter (recent chest x-ray)  ordering and/or review of 3+ test(s) in this encounter (labs and images along with EKG)    The patient's management necessitated moderate risk (prescription drug management including medications given in the ED).        New Prescriptions    No medications on file       Final diagnoses:   Shortness of breath   Persistent atrial fibrillation (H)    CHF (congestive heart failure) (H)   Metastatic cancer (H)       6/17/2024   [facility]       Denia Rutledge PA-C  06/17/24 3783

## 2024-06-17 NOTE — ED TRIAGE NOTES
Patient here with increased shortness of breath and cough. States the shortness of breath has been for the last 2 days. States she has had a cough for over a week. States she also has A-fib and when she is in A-fib she gets short of breath but usually her nebs make that better. She tried a neb and does not feel any better.      Triage Assessment (Adult)       Row Name 06/17/24 1196          Triage Assessment    Airway WDL WDL        Respiratory WDL    Respiratory WDL X;all;cough     Rhythm/Pattern, Respiratory shortness of breath     Cough Type nonproductive        Skin Circulation/Temperature WDL    Skin Circulation/Temperature WDL WDL        Cardiac WDL    Cardiac WDL WDL        Peripheral/Neurovascular WDL    Peripheral Neurovascular WDL WDL        Cognitive/Neuro/Behavioral WDL    Cognitive/Neuro/Behavioral WDL WDL

## 2024-06-18 NOTE — PROGRESS NOTES
[facility]: Transitions of Care Note  Chief Complaint   Patient presents with    Clinic Care Coordination - Follow-up       Most Recent Admission Date: 6/17/2024   Most Recent Admission Diagnosis:      Most Recent Discharge Date: 6/17/2024   Most Recent Discharge Diagnosis: Shortness of breath - R06.02  Persistent atrial fibrillation (H) - I48.19  CHF (congestive heart failure) (H) - I50.9  Metastatic cancer (H) - C79.9     Discussed with Dr. Bruce, patient will not need a provider visit prior to initiation of treatment. Patient is set up for day 8 follow up. Patient requesting that we draw an INR tomorrow, TORB from Dr. Teo may to draw. This writer reached out to [facility] coumadin [facility] for fax number to send results. Per coumadin clinic they will use her INR from yesterdays ER visit and will set her up in a week when she follows up with PCP, no need to draw INR tomorrow.     Transitions of Care Assessment    Discharge Assessment  How are you doing now that you are home?: I am doing better, I will need to use my neb treatment while there. Can I do this?  How are your symptoms? (Red Flag symptoms escalate to triage hotline per guidelines): Improved  Do you know how to contact your clinic care team if you have future questions or changes to your health status? : Yes  Does the patient have their discharge instructions? : Yes  Does the patient have questions regarding their discharge instructions? : No  Were you started on any new medications or were there changes to any of your previous medications? : Yes  Does the patient have all of their medications?: Yes  Do you have questions regarding any of your medications? : Yes (see comment) (will I be able to use my nebs while getting treatment.)  Do you have all of your needed medical supplies or equipment (DME)?  (i.e. oxygen tank, CPAP, cane, etc.): Yes             Follow up Plan     Discharge Follow-Up  Discharge follow up appointment scheduled in alignment  with recommended follow up timeframe or Transitions of Risk Category? (Low = within 30 days; Moderate= within 14 days; High= within 7 days): Yes  Discharge Follow Up Appointment Date: 06/26/24  Discharge Follow Up Appointment Scheduled with?: Specialty Care Provider    Future Appointments   Date Time Provider Department Center   6/19/2024  9:00 AM HC INF RM 3310 [facility] [place]   6/24/2024  8:00 AM [id number] [facility]   6/26/2024  8:30 AM HC INF RM 3308 [facility] [place]   6/26/2024  9:10 AM Sana Varela NP [facility] [place]   6/26/2024 10:00 AM HC INF RM 3302 [facility] [place]   7/10/2024  9:00 AM HC INF RM 3312 [facility] [place]   7/10/2024  9:30 AM Celia Bruce MD [facility] [place]   7/10/2024 10:00 AM HC INF RM 3308 [facility] [place]   7/15/2024  9:30 AM Abel Nguyen, DO [facility] [place]   7/17/2024  9:00 AM HC INF RM 3316 [facility] [place]   7/31/2024  9:30 AM HC INF RM 3316 [facility] [place]   7/31/2024 10:00 AM Celia Bruce MD [facility] [place]   7/31/2024 10:30 AM HC INF RM 3308 [facility] [place]   8/7/2024  9:00 AM HC INF RM 3304 [facility] [place]   8/21/2024  9:30 AM HC INF RM 3300 [facility] [place]   8/21/2024 10:00 AM Celia Bruce MD [facility] [place]   8/21/2024 10:30 AM HC INF RM 3314 [facility] [place]   8/28/2024  9:00 AM HC INF RM 3304 [facility] [place]   9/11/2024  9:30 AM HC INF RM 3300 [facility] [place]   9/11/2024 10:00 AM Sana Varela NP [facility] [place]   9/11/2024 11:00 AM HC INF RM 3316 [facility] [place]   9/18/2024  9:00 AM HC INF RM 3300 [facility] [place]   10/2/2024  9:30 AM HC INF RM 3302 [facility] [place]   10/2/2024 10:00 AM Sana Varela, NP [facility] [place]   10/2/2024 11:00 AM HC INF RM 3300 [facility] [place]   10/9/2024  9:00 AM HC INF RM 3300 [facility] [place]       Outpatient Plan as outlined on AVS reviewed with patient.    For any urgent concerns, please contact our 24 hour clinic  line:          Leticia Longoria RN

## 2024-06-19 NOTE — CONFIDENTIAL NOTE
Patient Magnesium is 1.3 do you want to replace this today?  Please advise     Also nearly a 2 gm drop in HGB from two days ago, patient denies any bleeding with urination, bowel movements or with phlegm. No history of gastric ulcers/diverticulosis.   Did have a higher INR yesterday and had some coumadin changes.  Coumadin clinic did talk with her about diet/couamdin.      Still waiting on CMP to result but those parameters are not for treatment- She is other wise ready to be treated please advise on Mag.  Thank you Laura

## 2024-06-19 NOTE — PHARMACY-ANTICOAGULATION SERVICE
I spoke with [facility] ([phone]) to inform about the drug interaction between warfarin and gemcitabine.  I gave information on gemcitabine received today, planned for day 8 on June 27, and that patient is on 28 day cycle, planned for 6 cycles through October.    Judith Daniels, PharmD on 6/19/2024

## 2024-06-19 NOTE — PROGRESS NOTES
Infusion Nursing Note:  Kerri Gonsales presents today for gemzar .    Patient seen by provider today: No   present during visit today: Not Applicable.    Note: Patient here today for day one gemzar.  Patient reports no healthcare changes since her ER visit.  Patient questions if she should get Chemo reporting she has A fib and it is quite fatiguing.  She has a cardiogram on Monday.  TC to provider, report patient heart beat is strong, steady and no indication of Afib present.  Patient reports her coumadin was changed yesterday and she follows that order to a jihan.  Patient also reports needing her neb every 4 hours, she has brought her own equipment to self administer.  Provider reports that patient can receive treatment today and if she does not agree we can cancel the day and she can begin next week.  Patient expresses a willingness to proceed today.  Magnesium 1.3 TRBO from Dr. Bruce to replace Mag with 2 gm over one hour.  Verification from pharmacy that this can be ran with gemcitabine.  Patient agrees.        Intravenous Access:  Labs drawn without difficulty.  Peripheral IV placed.    Treatment Conditions:  Lab Results   Component Value Date    HGB 10.8 (L) 06/19/2024    WBC 7.2 06/19/2024    ANEU 3.8 04/14/2021    ANEUTAUTO 5.4 06/19/2024     06/19/2024        Lab Results   Component Value Date     06/19/2024    POTASSIUM 3.6 06/19/2024    MAG 1.3 (L) 06/19/2024    CR 1.03 (H) 06/19/2024    KORI 8.4 (L) 06/19/2024    BILITOTAL 0.6 06/19/2024    ALBUMIN 3.3 (L) 06/19/2024    ALT 11 06/19/2024    AST 26 06/19/2024       Results reviewed, labs MET treatment parameters, ok to proceed with treatment.      Post Infusion Assessment:  Patient tolerated infusion without incident.  Blood return noted pre and post infusion.  Site patent and intact, free from redness, edema or discomfort.  No evidence of extravasations.  Access discontinued per protocol.       Discharge Plan:   Patient declined  prescription refills.  Discharge instructions reviewed with: Patient and Family.  Copy of AVS reviewed with patient and/or family.  Patient will return next week for next appointment.  Patient discharged in stable condition accompanied by: granddaughter.  Departure Mode: Wheelchair.      Laura Kumar RN

## 2024-06-21 PROBLEM — N39.0 UTI (URINARY TRACT INFECTION): Status: ACTIVE | Noted: 2024-01-01

## 2024-06-21 PROBLEM — G89.3 CANCER ASSOCIATED PAIN: Status: ACTIVE | Noted: 2024-01-01

## 2024-06-21 PROBLEM — R79.1 SUPRATHERAPEUTIC INR: Status: ACTIVE | Noted: 2024-01-01

## 2024-06-21 PROBLEM — E87.1 HYPONATREMIA: Status: ACTIVE | Noted: 2024-01-01

## 2024-06-21 PROBLEM — N17.9 ACUTE KIDNEY INJURY (H): Status: ACTIVE | Noted: 2024-01-01

## 2024-06-21 PROBLEM — R09.02 HYPOXIA: Status: ACTIVE | Noted: 2024-01-01

## 2024-06-21 NOTE — H&P
[facility]    History and Physical - Hospitalist Service       Date of Admission:  6/21/2024    Assessment & Plan      Kerri Gonsales is a 79 year old female admitted on 6/21/2024    # Acute kidney injury  -79 year old female With established diagnosis of metastatic uterine cancer who presented to the hospital complaining of decreased urine output, low oral intake, and having some blood in her pants.    -On admission  creatinine elevated at 1.9 from a baseline of 0.9,   -UA is positive for UTI,   - CT abdomen pelvis reported Worsening metastatic disease with interval enlargement of numerous solid nodules and masses in the lung bases and development of bilateral adrenal gland nodules. No evidence of acute abnormality of the ureters or bladder. No evidence of hydronephrosis.  - patient started on IV fluid.   - Was given 1 L bolus in the ER and started patient on normal saline at 75 ml every hour for the next 24 hours.   - We will be carefully assessing fluid status due to CHF.  -  We also held Lasix and losartan.    # UTI  -UA positive for UTI  Started Rocephin  -Follow urine culture-    # mild Hyponatremia  -Sodium low at 129  -Patient on normal saline    # Worsening metastatic uterine cancer with pain  -Follow-up with medical oncology in outpatient setting  -Pain control with oxygen diet    # atrial Fibrillation  # Supratherapeutic INR  -Resume home Cardizem and metoprolol  -Patient is on warfarin but INR elevated at 4.9  -Pharmacy will hold warfarin for now    # COPD  # Hypoxia  -Resume home inhalers  -Resume home oxygen      # CHF  -Home Lasix on hold given acute kidney injury  -Losartan on hold to  -Continue home metoprolol        Diet: Combination Diet Low Saturated Fat Na <2400mg Diet, No Caffeine Diet    DVT Prophylaxis: Warfarin  Smith Catheter: Not present  Lines: None     Cardiac Monitoring: None  Code Status: Full Code      Clinically Significant Risk Factors Present on Admission         #  "Hyponatremia: Lowest Na = 129 mmol/L in last 2 days, will monitor as appropriate      # Hypoalbuminemia: Lowest albumin = 3.3 g/dL at 6/21/2024  1:44 PM, will monitor as appropriate  # Drug Induced Coagulation Defect: home medication list includes an anticoagulant medication   # Acute Kidney Injury, unspecified: based on a >150% or 0.3 mg/dL increase in last creatinine compared to past 90 day average, will monitor renal function  # Hypertension: Noted on problem list            # DMII: A1C = N/A within past 6 months    # Overweight: Estimated body mass index is 28.38 kg/m  as calculated from the following:    Height as of this encounter: 1.626 m (5' 4\").    Weight as of this encounter: 75 kg (165 lb 5.5 oz).              Disposition Plan     Medically Ready for Discharge:            Heron Cuellar MD  Hospitalist Service  [facility]  Securely message with [employer] (more info)  Text page via [facility] Paging/Directory     ______________________________________________________________________    Chief Complaint   Decreased urine output, hematuria    History is obtained from the patient    History of Present Illness   Kerri Gonsales is a 79 year old female With established diagnosis of atrial fibrillation, CHF, hypertension, coronary disease, metastatic uterine cancer who presented to the hospital complaining of decreased urine output, low oral intake, and having some blood in her pants.  She denies fever, chills.  She also has some pubic pain.    On admission sodium is low at 129, creatinine elevated at 1.9 from a baseline of 0.9, UA is positive for UTI, INR elevated at 4.93 CT abdomen pelvis reported Worsening metastatic disease with interval enlargement of numerous solid nodules and masses in the lung bases and development of  bilateral adrenal gland nodules. No evidence of acute abnormality of the ureters or bladder. No evidence of hydronephrosis.    Patient assessed as having acute kidney injury, UTI, " hyponatremia, supratherapeutic INR, possibly metastatic uterine cancer    For acute kidney injury patient started on IV fluid.  Was given 1 L bolus in the ER and started patient on normal saline at 75 mm every hour for the next 24 hours.  We will be carefully assessing fluid status due to CHF.  We also held Lasix and losartan.    Regarding UTI patient started on Rocephin.    For hyponatremia.  Started on normal saline.          Past Medical History    Past Medical History:   Diagnosis Date    Atrial fibrillation with RVR (H) 02/22/2021    Benign essential hypertension 02/12/2021    Chronic diastolic heart failure (H) 02/12/2021    Elevated brain natriuretic peptide (BNP) level 03/31/2021    Goiter 03/31/2021    H/O hiatal hernia-moderate 03/31/2021    Left lower lobe pulmonary nodule 03/31/2021    Lower extremity edema 03/31/2021    Mixed hyperlipidemia 01/29/2024    New onset a-fib (H) 02/12/2021    On Coumadin for atrial fibrillation (H) 03/31/2021    Osteoarthritis of spine 03/31/2021    Persistent atrial fibrillation (H) 03/31/2021    Pneumonia due to infectious organism, unspecified laterality, unspecified part of lung 02/22/2021    Postmenopausal bleeding 01/29/2024    Prediabetes 01/29/2024    Simple chronic bronchitis (H) 02/12/2021    Stage 3 chronic kidney disease (H) 01/29/2024    Uncomplicated asthma        Past Surgical History   Past Surgical History:   Procedure Laterality Date    APPENDECTOMY      AS REMOVAL OF OVARY/TUBE(S) Left      Allergies:  Allergies[]Expand by Default         Allergies   Allergen Reactions    Panmycin [Tetracycline] Unknown       Listed at [facility].     Aspirin Palpitations       Heart palpitations-with high doses.          Family History:    Family History         Family History   Problem Relation Age of Onset    Heart Surgery Father      Diabetes Daughter      Coronary Artery Disease No family hx of      Hypertension No family hx of      Hyperlipidemia No family hx of       Breast Cancer No family hx of      Colon Cancer No family hx of      Prostate Cancer No family hx of      Cerebrovascular Disease No family hx of              Social History:  Marital Status:   [4]  Social History   []Expand by Default           Tobacco Use    Smoking status: Never    Smokeless tobacco: Never   Substance Use Topics    Alcohol use: Not Currently    Drug use: Never          Prior to Admission Medications   Prior to Admission Medications   Prescriptions Last Dose Informant Patient Reported? Taking?   albuterol (PROAIR HFA/PROVENTIL HFA/VENTOLIN HFA) 108 (90 Base) MCG/ACT inhaler   Yes No   Sig: Inhale 2 puffs into the lungs every 4 hours as needed for shortness of breath, wheezing or cough (every 4-6 hours)   budesonide (PULMICORT) 0.5 MG/2ML neb solution   Yes No   Sig: Take 0.5 mg by nebulization 2 times daily as needed (shortness of breath/ wheezing)   digoxin (LANOXIN) 125 MCG tablet 6/21/2024  No Yes   Sig: TAKE 1 TABLET BY MOUTH EVERY OTHER DAY   diltiazem ER (TIAZAC) 120 MG 24 hr ER beaded capsule 6/21/2024  Yes Yes   Sig: Take 120 mg by mouth every 12 hours (8:00 AM/8:30 PM)   furosemide (LASIX) 40 MG tablet 6/21/2024  No Yes   Sig: Take 1 tablet by mouth once daily   gentamicin (GARAMYCIN) 0.3 % ophthalmic solution   Yes No   Sig: Place 2 drops into both eyes every 4 hours as needed (dry eyes)   Patient not taking: Reported on 6/14/2024   guaiFENesin (ROBITUSSIN) 20 mg/mL liquid   Yes No   Sig: Take 200 mg by mouth every 4 hours as needed for cough Safe Tussin   ipratropium - albuterol 0.5 mg/2.5 mg/3 mL (DUONEB) 0.5-2.5 (3) MG/3ML neb solution   Yes No   Sig: Take 3 mLs by nebulization 2 times daily -and every 4 hours as needed.   losartan (COZAAR) 50 MG tablet 6/20/2024  No Yes   Sig: Take 1 tablet by mouth once daily   meclizine (ANTIVERT) 25 MG tablet   Yes No   Sig: Take 25 mg by mouth as needed for dizziness   Patient not taking: Reported on 6/14/2024   metFORMIN (GLUCOPHAGE) 500  MG tablet Past Month  Yes Yes   Sig: Take 500 mg by mouth 2 times daily (with meals)   metoprolol tartrate (LOPRESSOR) 100 MG tablet 6/20/2024  No Yes   Sig: Take 2 tablets by mouth twice daily   nystatin (MYCOSTATIN) 798975 UNIT/GM external cream   Yes No   Sig: Apply topically daily as needed   prochlorperazine (COMPAZINE) 10 MG tablet   No No   Sig: Take 0.5 tablets (5 mg) by mouth every 6 hours as needed for nausea or vomiting   Patient not taking: Reported on 6/19/2024   warfarin ANTICOAGULANT (COUMADIN) 5 MG tablet 6/21/2024  Yes Yes   Sig: Take 1 tablet (5 mg) by mouth once daily on Monday and Friday. Take 1/2 tablet (2.5 mg) all other days or as directed by [facility] anticoagulation.      Facility-Administered Medications: None        Review of Systems    The 10 point Review of Systems is negative other than noted in the HPI     Physical Exam   Vital Signs: Temp: 98.6  F (37  C) Temp src: Tympanic BP: 115/59 Pulse: 75   Resp: 18 SpO2: 95 % O2 Device: Nasal cannula Oxygen Delivery: 2 LPM  Weight: 165 lbs 5.52 oz    Constitutional: Alert and conversant. NAD   HENT: NCAT   Eyes: Normal pupils   Neck: supple   CV: No pallor, regular rate and rhythm  Pulmonary/Chest: Non-labored respirations, nasal cannula in place, wheezing  Abdominal: non-distended   MSK: ENCISO.  Bilateral lower extremity edema  Neuro: Alert and appropriate   Skin: Warm and dry. No diaphoresis. No rashes on exposed skin    Psych: Appropriate mood and affect         Medical Decision Making       80 MINUTES SPENT BY ME on the date of service doing chart review, history, exam, documentation & further activities per the note.      Data

## 2024-06-21 NOTE — MEDICATION SCRIBE - ADMISSION MEDICATION HISTORY
Medication Scribe Admission Medication History    Admission medication history is complete. The information provided in this note is only as accurate as the sources available at the time of the update.    Information Source(s): Patient, Joey/Jareth, and  [facility]  via phone    Coumadin information:  INR date: 6/17/24  INR result: 3.4  Next INR date: 6/25/24  Range:  (2.0-3.0 information obtained from this writer's last note on 3/29/24)  Indication: (a-fib information obtained from this writer's last note on 3/29/24)    Coumadin strength/instructions: 5 mg MF, 2.5 mg ROW  Tablet strength: 5 mg    Time of day patient takes coumadin at home: takes in the AM around 11  Exact last dose/time patient took PTA: today 11 AM 5 mg    Patient's coumadin clinic facility and contact: [facility]-closed ([phone])  Fax number for discharge instructions (if applicable): [phone]     Pertinent Information:   Patient manages her own medications and is a good historian.   Pain management- manages pain from chemo with apap 1000 mg PRN  Lasix- reports she has been having issues with voiding and was told to take 2 tablets for the past 2-3 days.   Metformin- reports she has temporarily discontinued due to the medication causing side effects (does not feel well when she takes). Wants to discuss with primary.   Metoprolol- reports she took her home AM dose while in the ER at 3 PM (did not take at home)    Changes made to PTA medication list:  Added: apap  Deleted: None  Changed: budesonide- from PRN to BID (takes scheduled)  Dupnebs- takes every 4h while awake    Allergies reviewed with patient and updates made in EHR: yes    Medication History Completed By: Marisol Zavala 6/21/2024 6:54 PM    PTA Med List   Medication Sig Note Last Dose    acetaminophen (TYLENOL) 500 MG tablet Take 1,000 mg by mouth every 8 hours as needed (pain from chemo)  6/20/2024 at HS    albuterol (PROAIR HFA/PROVENTIL HFA/VENTOLIN  HFA) 108 (90 Base) MCG/ACT inhaler Inhale 2 puffs into the lungs every 4 hours as needed for shortness of breath, wheezing or cough (every 4-6 hours)  PRN at PRN    budesonide (PULMICORT) 0.5 MG/2ML neb solution Take 0.5 mg by nebulization 2 times daily 6/21/2024: Sometimes will take a third dose 6/21/2024 at 1200    digoxin (LANOXIN) 125 MCG tablet TAKE 1 TABLET BY MOUTH EVERY OTHER DAY  6/21/2024 at 0900    diltiazem ER (TIAZAC) 120 MG 24 hr ER beaded capsule Take 120 mg by mouth every 12 hours (8:00 AM/8:30 PM)  6/21/2024 at 0900    furosemide (LASIX) 40 MG tablet Take 1 tablet by mouth once daily (Patient taking differently: Take 80 mg by mouth daily) 6/21/2024: Has been taking 80 mg for the past 2-3 days to try increase voiding.  6/21/2024 at 0900    gentamicin (GARAMYCIN) 0.3 % ophthalmic solution Place 2 drops into both eyes every 4 hours as needed (dry eyes)  PRN at PRN    guaiFENesin (ROBITUSSIN) 20 mg/mL liquid Take 200 mg by mouth every 4 hours as needed for cough Safe Tussin  PRN at PRN    ipratropium - albuterol 0.5 mg/2.5 mg/3 mL (DUONEB) 0.5-2.5 (3) MG/3ML neb solution Take 3 mLs by nebulization every 4 hours -while awake  6/21/2024 at 1200    losartan (COZAAR) 50 MG tablet Take 50 mg by mouth at bedtime  6/20/2024 at 2030    meclizine (ANTIVERT) 25 MG tablet Take 25 mg by mouth as needed for dizziness  PRN at PRN    metoprolol tartrate (LOPRESSOR) 100 MG tablet Take 2 tablets by mouth twice daily  6/21/2024 at 1500    nystatin (MYCOSTATIN) 575729 UNIT/GM external cream Apply topically daily as needed  PRN at PRN    prochlorperazine (COMPAZINE) 10 MG tablet Take 0.5 tablets (5 mg) by mouth every 6 hours as needed for nausea or vomiting  PRN at PRN    warfarin ANTICOAGULANT (COUMADIN) 5 MG tablet Take 1 tablet (5 mg) by mouth once daily on Monday and Friday. Take 1/2 tablet (2.5 mg) all other days or as [facility]. Takes around 11:00 AM.  6/21/2024 at 1100 5 mg

## 2024-06-21 NOTE — ED PROVIDER NOTES
"[facility]  ED Provider Note    Chief Complaint   Patient presents with    Hematuria     History:  Kerri Gonsales is a 79 year old female with history of heart failure A-fib Coumadin metastatic cancer from the uterus who presents to the emergency department today for concern for having not been able to urinate today also that she found some blood in her pants she does not know where it came from.  She states she has not been taking in as much fluid as she should have lately.  No other complaints    Review of Systems   Performed; see HPI for pertinent positives and negatives.     Medical history, surgical history, and social history was reviewed.  Nursing documentation, triage note, and vitals were reviewed.    Vitals:  BP: 112/58  Pulse: (!) 137  Temp: 98.1  F (36.7  C)  Resp: 20  Height: 162.6 cm (5' 4\")  Weight: 75 kg (165 lb 5.5 oz)  SpO2: 92 %    Physical Exam:  Constitutional: Alert and conversant. NAD   HENT: NCAT   Eyes: Normal pupils   Neck: supple   CV: No pallor  Pulmonary/Chest: Non-labored respirations, nasal cannula in place  Abdominal: non-distended   MSK: ENCISO.  Bilateral lower extremity edema  Neuro: Alert and appropriate   Skin: Warm and dry. No diaphoresis. No rashes on exposed skin    Psych: Appropriate mood and affect       MDM:      ED Course as of 06/21/24 1821 Fri Jun 21, 2024 1724 79-year-old female with extensive metastatic cancer from the uterus presents to the emergency department concern for having not urinated today despite trying.  She also noticed some blood in her pants and there is a wound on her her rear end. this appears to be the source of the bleeding based on the stains in her underwear   1744 There is an CHINO.  It is likely the patient is not urinating significantly because of decreased urine production.  Discussed the possibility of an obstructive physiology with the patient's oncologist and therefore we ordered a CT.  There is no hydronephrosis on CT and " therefore no evidence of an obstructive process.   1748 CT reveals fairly extensive metastatic disease including on the adrenal glands   1748 Patient is hypovolemic on exam.  She has on 2 L of oxygen which she usually only requires intermittently.  Fluids started.  Albuterol given.  Some relief of symptoms.  Given the amount of fluid that is likely to be required in her history of heart failure and likelihood of possibly third spacing or placing the fluid into her lungs, will plan for admission       Procedures:  Procedures    Impression:  Final diagnoses:   Hypoxia   Acute kidney injury (H24)            Elvin Mendez MD  06/21/24 [id number]

## 2024-06-21 NOTE — ED TRIAGE NOTES
Here for some blood in urine and inability to urinate as much as her normal. States she has not voided at all yet today.  Denies any pain.  Started first chemo treatment  Wednesday, does not have a port.

## 2024-06-21 NOTE — PROGRESS NOTES
[facility]: Cancer Care                                                                                        Patient called at the end of the day on 6-21-24 reporting that she was having left sided rib pain. She was wondering if it would be okay to take tylenol. Patient       Signature:  Leticia Longoria RN

## 2024-06-21 NOTE — ED NOTES
Patient presents with c/o abdominal/bladder discomfort with blood on her liner in underwear. Patient unsure if blood is coming from urine or vagina. Patient reports decreased urination, denies any burning with urination. Reports that she has not voided yet today. Patient also appears to be in Afib, with rapid rate- reports that she has not taking her morning dose of 200 mg of metoprolol. Denies any chest pain.

## 2024-06-21 NOTE — TELEPHONE ENCOUNTER
Left a text message for patient to call this SW back.  This SW did initially call but there was no VM.  SO this SW texted asking fro a call back to discuss transportation.    [employer] has a volunteer  program in this area which patient could access.    Will await call back from patient.

## 2024-06-21 NOTE — PLAN OF CARE
[facility] Inpatient Admission Note:    Patient admitted to 3106/3106-1 at approximately 1750 via wheel chair accompanied by transport tech from emergency room . Report received from MICHAEL Cheng in SBAR format at 1737 via telephone. Patient ambulated to bed via self.. Patient is alert and oriented X 3, denies pain; rates at 0 on 0-10 scale.  Patient oriented to room, unit, hourly rounding, and plan of care. Explained admission packet and patient handbook with patient bill of rights brochure. Will continue to monitor and document as needed.     Inpatient Nursing criteria listed below was met:    Health care directives status obtained and documented: No    Patient identifies a surrogate decision maker: Yes If yes, who:Marcin  Contact Information:[phone]     If initial lactic acid greater than 2.0, repeat lactic acid drawn within one hour of arrival to unit: NA. If no, state reason:     Clergy visit ordered if patient requests: N/A    Skin issues/needs documented: Yes and No    Isolation Patient: no Education given, correct sign in place and documentation row added to PCS:  Yes    Fall Prevention Yes: Care plan updated, education given and documented, sticker and magnet in place: Yes    Care Plan initiated: Yes    Education Documented (including assessment): Yes    Patient has discharge needs :  not at this time  If yes, please explain:

## 2024-06-22 NOTE — PLAN OF CARE
"/59 (BP Location: Left arm)   Pulse 75   Temp 98.6  F (37  C) (Tympanic)   Resp 18   Ht 1.626 m (5' 4\")   Wt 75 kg (165 lb 5.5 oz)   SpO2 95%   BMI 28.38 kg/m      A&O, VSS, afebrile, denies pain, remains on 2L NC, infreq nonproductive cough, up in chair assist 1/sba with cane/gaitbelt, voidedx1, IV running NS at 75ml/hr, free from falls, call light within reach, chair alarm on, makes needs known.    Face to face report given with opportunity to observe patient.    Report given to MICHAEL Carlson RN   6/21/2024  7:26 PM    "

## 2024-06-22 NOTE — PHARMACY-ANTICOAGULATION SERVICE
Pharmacy Consult-Warfarin Assessment Day #2    Kerri Gonsales is a 79 year old female admitted on 6/21/2024 with <principal problem not specified>    Primary Indication(s) for Anticoagulation: A-fib    Goal INR: 2-3    FYI, patient is followed by the Anticoagulation/Protime Clinic at: [facility]    Patient previously anticoagulated on Coumadin at a dose of 22.5 mg/week; dosed as: 5 mg Monday and Friday, 2.5 mg ROW Patient takes in the AM around 11     Home tablet strength(s): 5 mg    Factors that may increase patient's bleeding risk and/or sensitivity to warfarin (Coumadin) include: Advanced age, gemcitabine, hematuria    Factors that may decrease patient's bleeding risk and/or sensitivity to warfarin (Coumadin) include: -      Anticoagulation Dose History  More data exists               6/21/2024 6/22/2024      INR       4.93  6.88            CBC RESULTS:   Recent Labs   Lab Test 06/22/24  0518   HGB 11.3*          Assessment/Plan: INR remains supratherapeutic; hold warfarin today. Check INR with AM labs.     Thank You for the Consult. Will continue to follow.    Jared Taveras [facility] ....................  6/22/2024   8:55 AM

## 2024-06-22 NOTE — PHARMACY-ANTICOAGULATION SERVICE
Pharmacy Consult-Warfarin Assessment Day #1    Kerri Gonsales is a 79 year old female admitted on 6/21/2024 with <principal problem not specified>    Primary Indication(s) for Anticoagulation: afib    Goal INR: 2-3    FYI, patient is followed by the Anticoagulation/Protime Clinic at: [facility]     Patient previously anticoagulated on Coumadin at a dose of 22.5 mg/week; dosed as: 5 mg Monday and Friday, 2.5 mg ROW Patient takes in the AM around 11    Home tablet strength(s): 5 mg    Factors that may increase patient's bleeding risk and/or sensitivity to warfarin (Coumadin) include: advanced age, gemcitabine & dexamethasone given 6/19, blood in urine      Factors that may decrease patient's bleeding risk and/or sensitivity to warfarin (Coumadin) include:     Dietary Considerations: Combination diet low saturated fat, no caffeine     Anticoagulation Dose History  More data exists         6/21/2024   Recent Dosing and Labs   INR 4.93   Patient took 5 mg dose this AM at home        CBC RESULTS:   Recent Labs   Lab Test 06/21/24  1344   HGB 11.2*          Assessment/Plan: Hold warfarin tonight. Recheck INR tomorrow AM.     Thank You for the Consult. Will continue to follow.    Mariely Benavides RPH ....................  6/21/2024   7:03 PM

## 2024-06-22 NOTE — PROGRESS NOTES
[facility]    Medicine Progress Note - Hospitalist Service    Date of Admission:  6/21/2024    Assessment & Plan        # Acute kidney injury  -79 year old female With established diagnosis of metastatic uterine cancer who presented to the hospital complaining of decreased urine output, low oral intake, and having some blood in her pants.    -On admission  creatinine elevated at 1.9 from a baseline of 0.9,   -UA is positive for UTI,   - CT abdomen pelvis reported Worsening metastatic disease with interval enlargement of numerous solid nodules and masses in the lung bases and development of bilateral adrenal gland nodules. No evidence of acute abnormality of the ureters or bladder. No evidence of hydronephrosis.  - patient started on IV fluid.   - Was given 1 L bolus in the ER and started patient on normal saline at 75 ml every hour for the next 24 hours.   - We will be carefully assessing fluid status due to CHF.  -  We also held Lasix and losartan.  -Today 06/22 creatinine still elevated at 1.49 but improved from 1.91.  Continue IV fluid     # UTI  -UA positive for UTI  -on Rocephin  -Follow urine culture-     # mild Hyponatremia  -Sodium low at 129  -Patient on normal saline  -Today 6/22 sodium is 132 from 129.  Continue normal saline     # Worsening metastatic uterine cancer with pain  -Follow-up with medical oncology in outpatient setting  -Pain control with oxygen diet     # atrial Fibrillation  # Supratherapeutic INR  -Resume home Cardizem and metoprolol  -Patient is on warfarin but INR elevated at 4.9  -Pharmacy will hold warfarin for now  -Today 6-22 INR 6.88.  No bleeding  -Continue holding warfarin by pharmacy  -resume home metoprolol, cardizem CD     # COPD  # Hypoxia  -on home inhalers  -on  home oxygen        # CHF  -Home Lasix on hold given acute kidney injury  -Losartan on hold to  -Continue home metoprolol     Diet: Combination Diet Low Saturated Fat Na <2400mg Diet, No Caffeine Diet   "  DVT Prophylaxis: Warfarin  Smith Catheter: Not present  Lines: None     Cardiac Monitoring: ACTIVE order. Indication: Tachyarrhythmias, acute (48 hours)  Code Status: Full Code      Clinically Significant Risk Factors Present on Admission         # Hyponatremia: Lowest Na = 129 mmol/L in last 2 days, will monitor as appropriate    # Hypomagnesemia: Lowest Mg = 1.6 mg/dL in last 2 days, will replace as needed   # Hypoalbuminemia: Lowest albumin = 3.3 g/dL at 6/22/2024  5:18 AM, will monitor as appropriate  # Drug Induced Coagulation Defect: home medication list includes an anticoagulant medication   # Acute Kidney Injury, unspecified: based on a >150% or 0.3 mg/dL increase in last creatinine compared to past 90 day average, will monitor renal function  # Hypertension: Noted on problem list            # DMII: A1C = N/A within past 6 months    # Overweight: Estimated body mass index is 28.38 kg/m  as calculated from the following:    Height as of this encounter: 1.626 m (5' 4\").    Weight as of this encounter: 75 kg (165 lb 5.5 oz).              Disposition Plan     Medically Ready for Discharge:              Heron Cuellar MD  Hospitalist Service  [facility]  Securely message with [employer] (more info)  Text page via [facility] Paging/Directory   ______________________________________________________________________    Interval History     Kerri Gonsales is a 79 year old female With established diagnosis of atrial fibrillation, CHF, hypertension, coronary disease, metastatic uterine cancer who presented to the hospital complaining of decreased urine output, low oral intake, and having some blood in her pants.  She denies fever, chills.  She also has some pubic pain.     On admission sodium is low at 129, creatinine elevated at 1.9 from a baseline of 0.9, UA is positive for UTI, INR elevated at 4.93 CT abdomen pelvis reported Worsening metastatic disease with interval enlargement of numerous solid nodules and " masses in the lung bases and development of  bilateral adrenal gland nodules. No evidence of acute abnormality of the ureters or bladder. No evidence of hydronephrosis.     Patient assessed as having acute kidney injury, UTI, hyponatremia, supratherapeutic INR, possibly metastatic uterine cancer     For acute kidney injury patient started on IV fluid.  Was given 1 L bolus in the ER and started patient on normal saline at 75 mm every hour for the next 24 hours.  We will be carefully assessing fluid status due to CHF.  We also held Lasix and losartan.     Regarding UTI patient started on Rocephin.     For hyponatremia.  Started on normal saline.    Today 6/22 patient seen and examined.  She feels a little better than on admission.  Regarding her acute kidney injury creatinine still elevated at 1.49 but improved to 1.91.  Continue IV fluid.  Her INR is elevated at 6.88 but no bleeding.  Will continue to closely monitor.  Hold off warfarin for now.  Regarding UTI urine culture pending so far.  Continue Rocephin.    Physical Exam   Vital Signs: Temp: 99.4  F (37.4  C) Temp src: Oral BP: 127/64 Pulse: (!) 165   Resp: 22 SpO2: 97 % O2 Device: Nasal cannula Oxygen Delivery: 1 LPM  Weight: 165 lbs 5.52 oz    Constitutional: Alert and conversant. NAD   HENT: NCAT   Eyes: Normal pupils   Neck: supple   CV: No pallor, regular rate and rhythm  Pulmonary/Chest: Non-labored respirations, nasal cannula in place, wheezing  Abdominal: non-distended   MSK: ENCISO.  Bilateral lower extremity edema  Neuro: Alert and appropriate   Skin: Warm and dry. No diaphoresis. No rashes on exposed skin    Psych: Appropriate mood and affect         Medical Decision Making       40 MINUTES SPENT BY ME on the date of service doing chart review, history, exam, documentation & further activities per the note.      Data

## 2024-06-22 NOTE — PLAN OF CARE
"Reason for admission: hypoxia   Pt is AO.  SBA with GB and cane. Chronically on 2L O2 sats 90-96%. HRR irregular 70-80's. Notified MD of excoriated buttocks and possible yeast seen. MD ordered nystatin cream. Nystatin Cream applied today. PRN oral Ativan given x1 today for anxiety pt had some episode of being confused after taking a nap after ativan. Pt is currently AO and reports would not like to take that anymore.   Makes needs known, Call light within reach, wheels locked, ID band on. Denies pain throughout shift. IV  NS @75ml/hr, ABX rocephin    /56 (BP Location: Left arm, Patient Position: Sitting, Cuff Size: Adult Regular)   Pulse 84   Temp 98.5  F (36.9  C) (Tympanic)   Resp 20   Ht 1.626 m (5' 4\")   Wt 75 kg (165 lb 5.5 oz)   SpO2 94%   BMI 28.38 kg/m    Face to face report given with opportunity to observe patient.    Report given to Aldo Calix RN on 6/22/2024 at 7:15 PM          "

## 2024-06-22 NOTE — PHARMACY-MEDICATION REGIMEN REVIEW
Pharmacy Antimicrobial Stewardship Assessment     Current Antimicrobial Therapy:  Anti-infectives (From now, onward)      Start     Dose/Rate Route Frequency Ordered Stop    24 1900  cefTRIAXone IN D5W (ROCEPHIN) intermittent infusion 2 g         2 g  100 mL/hr over 30 Minutes Intravenous EVERY 24 HOURS 24 1825              Indication: UTI    Days of Therapy: 2     Pertinent Labs:    Recent Labs   Lab Test 24  0518 24  1344 24  0926 WBC 8.8 8.5 7.2     Temperature:  Temp (24hrs), Av.4  F (36.9  C), Min:98  F (36.7  C), Max:98.8  F (37.1  C)      Culture Results:   30-Day Micro Results       Collected Updated Procedure Result Status      2024 1416 2024 1427 Urine Culture [[id number]]   Urine, Midstream    In process Component Value   No component results                        Recommendations/Interventions:  1. Consider obtaining blood cultures; chemotherapy given 24    Jared Taveras, [facility]  2024

## 2024-06-22 NOTE — PLAN OF CARE
"Reason for hospital stay:  Hypoxia  Living situation PTA: Home  Most recent vitals: BP (!) 129/46 (BP Location: Left arm, Patient Position: Sitting, Cuff Size: Adult Regular)   Pulse 112   Temp 98.8  F (37.1  C) (Tympanic)   Resp 22   Ht 1.626 m (5' 4\")   Wt 75 kg (165 lb 5.5 oz)   SpO2 94%   BMI 28.38 kg/m        Pain Management:  Pt rated pain at 5/10 on pain scale. Prn tylenol given with relief at 4/10 pain. Pt denies pain at end of shift.  LOC:  A&O x 4  Cardiac:  HRR irregular. MD notified and Telemetry unit was placed. Pt had also not been given her night time dose of Diltiazem, so this was given and she was monitored throughout the shift. BP a bit soft at 96/60, she does have fluids running at 75/hr.  **HR was still irregular from 70's-170's, MD was notified. 2.5 mg of IV Metoprolol given.  Respiratory:  Lungs clear in upper lobes, but diminished throughout. O2 93-97% on 2 lpm via nc.  GI:  Normoactive BS x 4  :  Pt voiding spontaneously w/o difficulty this shift. Up to bedside commode.   Skin Issues:  Some scattered bruising present. Buttocks/sacral/coccyx area excoriated due to diarrhea prior to admission. Barrier cream applied.    IVF:  NS @ 75 mL/hr.  ABX:  Rocephin    Nutrition: Low sat fat/ 2400 mg NA, and no caffeine, fair appetite.  Activity: SBA w/ gait belt and cane  Safety:  Up in chair, wheels locked, and alarms in place. Call light and personal items within reach and makes needs known. Room near nurses station w/ door open and frequent rounding.    Face to face report given with opportunity to observe patient.    Report given to MICHAEL Salas RN   6/22/2024  7:15 AM      "

## 2024-06-22 NOTE — PHARMACY-MEDICATION REGIMEN REVIEW
Pharmacy Antimicrobial Stewardship Assessment     Current Antimicrobial Therapy:  Anti-infectives (From now, onward)      Start     Dose/Rate Route Frequency Ordered Stop    24 1900  cefTRIAXone IN D5W (ROCEPHIN) intermittent infusion 2 g         2 g  100 mL/hr over 30 Minutes Intravenous EVERY 24 HOURS 24 1825              Indication: UTI    Days of Therapy: 1     Pertinent Labs:    Recent Labs   Lab Test 24  1344 24  0926 24  1336 24  1425 24  1106 24  0320 24  1405   WBC 8.5 7.2 7.2 6.8 6.8 7.7 6.6       Recent Labs   Lab Test 24  0620 24  0320 21  0444 21  1412 21  0522   LACT 1.5 2.3*   < > 2.2*  --    CRPI  --  10.67*  --   --   --    PCAL  --   --   --  <0.05 <0.05    < > = values in this interval not displayed.        Temperature:  Temp (24hrs), Av.4  F (36.9  C), Min:98.1  F (36.7  C), Max:98.6  F (37  C)      Culture Results:   30-Day Micro Results       Collected Updated Procedure Result Status      2024 1416 2024 1427 Urine Culture [[id number]]   Urine, Midstream    In process Component Value   No component results                          Recommendations/Interventions:  No recommendations at this time. Will continue to monitor.     Mariely Benavides RPH  2024

## 2024-06-23 NOTE — PHARMACY-MEDICATION REGIMEN REVIEW
Pharmacy Antimicrobial Stewardship Assessment     Current Antimicrobial Therapy:  Anti-infectives (From now, onward)      Start     Dose/Rate Route Frequency Ordered Stop    24 1900  cefTRIAXone IN D5W (ROCEPHIN) intermittent infusion 2 g         2 g  100 mL/hr over 30 Minutes Intravenous EVERY 24 HOURS 24 1825              Indication: UTI     Days of Therapy: 3     Pertinent Labs:    Recent Labs   Lab Test 24  0517 24  0518 24  1344 24  0926 24  1336 24  1425 24  1106   WBC 10.0 8.8 8.5 7.2 7.2 6.8 6.8       Recent Labs   Lab Test 24  0620 24  0320 21  0444 21  1412 21  0522   LACT 1.5 2.3*   < > 2.2*  --    CRPI  --  10.67*  --   --   --    PCAL  --   --   --  <0.05 <0.05    < > = values in this interval not displayed.        Temperature:  Temp (24hrs), Av.1  F (37.3  C), Min:98.4  F (36.9  C), Max:99.9  F (37.7  C)      Culture Results:   30-Day Micro Results       Collected Updated Procedure Result Status      2024 1416 2024 0930 Urine Culture [[id number]]    Urine, Midstream    Final result Component Value   Culture >100,000 CFU/mL Mixture of Urogenital Lisha                      Recommendations/Interventions:  1. Consider obtaining blood cultures; chemotherapy given 24   2. Patient is tachycardic-obtain procalcitonin if warranted    Jared Taveras, [facility]  2024

## 2024-06-23 NOTE — PHARMACY-ANTICOAGULATION SERVICE
Pharmacy Consult-Warfarin Assessment Day #3    Kerri Gonsales is a 79 year old female admitted on 6/21/2024 with <principal problem not specified>    Primary Indication(s) for Anticoagulation: A-fib    Goal INR: 2-3    FYI, patient is followed by the Anticoagulation[facility] at: [facility]    Patient previously anticoagulated on Coumadin at a dose of 22.5 mg/week; dosed as: 5 mg Monday and Friday, 2.5 mg ROW Patient takes in the AM around 11     Home tablet strength(s): 5 mg    Factors that may increase patient's bleeding risk and/or sensitivity to warfarin (Coumadin) include: Advanced age, gemcitabine, hematuria    Factors that may decrease patient's bleeding risk and/or sensitivity to warfarin (Coumadin) include: -      Anticoagulation Dose History  More data exists               6/21/2024 6/22/2024 6/23/2024       INR       4.93  6.88  5.59            Assessment/Plan: INR remains supratherapeutic; hold warfarin today. Check INR with AM labs.     Thank You for the Consult. Will continue to follow.    Jared Taveras [facility] on 6/23/2024 at 8:58 AM

## 2024-06-23 NOTE — PLAN OF CARE
"Pt is AO.  SBA with GB and cane. Chronically on 2L O2 sats 90-96%, occasionally dip throughout the day but would rebound to 90's quickly. HRR irregular 70-80's, would increase with activity to 1 teens. Md ordered Dr Wright butt paste d/t butt more excoriated and bleeding minimally today. Dr. Wright Cream applied today. Md notified pt has weak dry nonproductive cough, and pt requesting robitussin, Md ordered, med given. Outputs as charted.  Makes needs known, Call light within reach, wheels locked, ID band on. Denies pain throughout shift. IV  NS @75ml/hr, ABX rocephin     BP (!) 122/47 (BP Location: Right arm, Patient Position: Sitting, Cuff Size: Adult Regular)   Pulse 91   Temp 98.2  F (36.8  C) (Tympanic)   Resp 20   Ht 1.626 m (5' 4\")   Wt 75 kg (165 lb 5.5 oz)   SpO2 94%   BMI 28.38 kg/m        Face to face report given with opportunity to observe patient.    Report given to Chata Calix RN   6/23/2024  6:59 PM    "

## 2024-06-23 NOTE — PLAN OF CARE
"Reason for hospital stay:  Hypoxia  Living situation PTA: Home  Most recent vitals: BP (!) 132/48 (BP Location: Right arm, Patient Position: Sitting, Cuff Size: Adult Regular)   Pulse 109   Temp 99.4  F (37.4  C) (Tympanic)   Resp 18   Ht 1.626 m (5' 4\")   Wt 75 kg (165 lb 5.5 oz)   SpO2 94%   BMI 28.38 kg/m        Pain Management:  Pt denies pain this shift  LOC:  A&O x 4  Cardiac:  HRR irregular. BP stable and WDL.    ** Pt's HR was reaching the 180's. We did attempt to blow through a straw. MD was notified. Morning doses of Cardizem and metoprolol were ordered to give.  Respiratory:  Lungs clear in upper lobes, but diminished throughout. O2 92-97% on 2 lpm via nc.  GI:  Normoactive BS x 4  :  Pt voiding spontaneously w/o difficulty this shift. Up to bedside commode.   Skin Issues:  Some scattered bruising present. Buttocks/sacral/coccyx area excoriated due to diarrhea prior to admission. Nystatin cream applied.    IVF:  SL.  ABX:  Rocephin    Nutrition: Low sat fat/ 2400 mg NA, and no caffeine, fair appetite.  Activity: SBA w/ gait belt and cane  Safety:  Up in chair, wheels locked, and alarms in place. Call light and personal items within reach and makes needs known. Room near nurses station w/ door open and frequent rounding.    Face to face report given with opportunity to observe patient.    Report given to MICHAEL Salas RN   6/23/2024  7:15 AM      "

## 2024-06-23 NOTE — PROGRESS NOTES
[facility]    Medicine Progress Note - Hospitalist Service    Date of Admission:  6/21/2024    Hospital course  Kerri Gonsales is a 79 year old female With established diagnosis of atrial fibrillation, CHF, hypertension, coronary disease, metastatic uterine cancer who presented to the hospital complaining of decreased urine output, low oral intake, and having some blood in her pants.  She denies fever, chills.  She also has some pubic pain.     On admission sodium is low at 129, creatinine elevated at 1.9 from a baseline of 0.9, UA is positive for UTI, INR elevated at 4.93 CT abdomen pelvis reported Worsening metastatic disease with interval enlargement of numerous solid nodules and masses in the lung bases and development of  bilateral adrenal gland nodules. No evidence of acute abnormality of the ureters or bladder. No evidence of hydronephrosis.     Patient assessed as having acute kidney injury, UTI, hyponatremia, supratherapeutic INR, possibly metastatic uterine cancer     For acute kidney injury patient started on IV fluid.  Was given 1 L bolus in the ER and started patient on normal saline at 75 mm every hour for the next 24 hours.  We will be carefully assessing fluid status due to CHF.  We also held Lasix and losartan.     Regarding UTI patient started on Rocephin.     For hyponatremia.  Started on normal saline.      6/22 patient seen and examined.  She feels a little better than on admission.  Regarding her acute kidney injury creatinine still elevated at 1.49 but improved to 1.91.  Continue IV fluid.  Her INR is elevated at 6.88 but no bleeding.  Will continue to closely monitor.  Hold off warfarin for now.  Regarding UTI urine culture pending so far.  Continue Rocephin.    Today 6/23 patient seen and examined.  She is having some palpitation this morning.  No nausea or vomiting.  She also feels weak.  Heart rate is in the 170s.  We are giving early her metoprolol 200 mg twice a day and  Cardizem  mg twice a day.  She is also on digoxin and has a therapeutic level in her blood.  If heart rate does not improve may need to give additional IV metoprolol or increase Cardizem CD dose.  Regarding acute kidney injury creatinine has improved at 1.09 and patient is off IV fluid now.  Will resume home Lasix at 40 mg daily for her CHF.  INR remains elevated at 5.59 but is improving from 6.98 the day prior.  No bleeding.      Assessment & Plan        # Acute kidney injury  -79 year old female With established diagnosis of metastatic uterine cancer who presented to the hospital complaining of decreased urine output, low oral intake, and having some blood in her pants.    -On admission  creatinine elevated at 1.9 from a baseline of 0.9,   -UA is positive for UTI,   - CT abdomen pelvis reported Worsening metastatic disease with interval enlargement of numerous solid nodules and masses in the lung bases and development of bilateral adrenal gland nodules. No evidence of acute abnormality of the ureters or bladder. No evidence of hydronephrosis.  - patient started on IV fluid.   - Was given 1 L bolus in the ER and started patient on normal saline at 75 ml every hour for the next 24 hours.   - We will be carefully assessing fluid status due to CHF.  -  We also held Lasix and losartan.  -Today 06/23 creatinine has improved at 1.09.  -Patient is of IV fluid now  -Will resume home Lasix 40 mg daily     # UTI  -UA positive for UTI  -on Rocephin       # mild Hyponatremia  -Sodium low at 129  -Patient on normal saline  -Today 6/23 sodium is 133      # Worsening metastatic uterine cancer with pain  -Follow-up with medical oncology in outpatient setting  -Pain control with oxygen diet     # atrial Fibrillation  # Supratherapeutic INR  -Resume home Cardizem and metoprolol  -Patient is on warfarin but INR elevated at 4.9  -Pharmacy will hold warfarin for now  -6/22 INR 6.88.  No bleeding  -Continue holding warfarin by  "pharmacy  -resume home metoprolol, cardizem CD  -Today 6/23 heart rate was in the 170s this morning  -We are giving home metoprolol 200 mg twice daily and Cardizem CD1 20 mg twice daily  -Patient is also on digoxin.  -Will reevaluate heart rate throughout the day and may need to increase Cardizem CD dose.     # COPD  # Hypoxia  -on home inhalers  -on  home oxygen        # CHF  --Continue home metoprolol  -resume home lasix           Diet: Combination Diet Low Saturated Fat Na <2400mg Diet, No Caffeine Diet    DVT Prophylaxis: Heparin SQ  Smith Catheter: Not present  Lines: None     Cardiac Monitoring: ACTIVE order. Indication: Tachyarrhythmias, acute (48 hours)  Code Status: Full Code      Clinically Significant Risk Factors         # Hyponatremia: Lowest Na = 129 mmol/L in last 2 days, will monitor as appropriate    # Hypomagnesemia: Lowest Mg = 1.6 mg/dL in last 2 days, will replace as needed   # Hypoalbuminemia: Lowest albumin = 3.3 g/dL at 6/22/2024  5:18 AM, will monitor as appropriate  # Coagulation Defect: INR = 5.59 (Ref range: 0.85 - 1.15) and/or PTT = N/A, will monitor for bleeding    # Hypertension: Noted on problem list             # DMII: A1C = N/A within past 6 months, PRESENT ON ADMISSION  # Overweight: Estimated body mass index is 28.38 kg/m  as calculated from the following:    Height as of this encounter: 1.626 m (5' 4\").    Weight as of this encounter: 75 kg (165 lb 5.5 oz)., PRESENT ON ADMISSION            Disposition Plan     Medically Ready for Discharge:              Heron Cuellar MD  Hospitalist Service  [facility]  Securely message with [employer] (more info)  Text page via [facility] Paging/Directory   ______________________________________________________________________    Interval History     Kerri Gonsales is a 79 year old female With established diagnosis of atrial fibrillation, CHF, hypertension, coronary disease, metastatic uterine cancer who presented to the hospital " complaining of decreased urine output, low oral intake, and having some blood in her pants.  She denies fever, chills.  She also has some pubic pain.     On admission sodium is low at 129, creatinine elevated at 1.9 from a baseline of 0.9, UA is positive for UTI, INR elevated at 4.93 CT abdomen pelvis reported Worsening metastatic disease with interval enlargement of numerous solid nodules and masses in the lung bases and development of  bilateral adrenal gland nodules. No evidence of acute abnormality of the ureters or bladder. No evidence of hydronephrosis.     Patient assessed as having acute kidney injury, UTI, hyponatremia, supratherapeutic INR, possibly metastatic uterine cancer     For acute kidney injury patient started on IV fluid.  Was given 1 L bolus in the ER and started patient on normal saline at 75 mm every hour for the next 24 hours.  We will be carefully assessing fluid status due to CHF.  We also held Lasix and losartan.     Regarding UTI patient started on Rocephin.     For hyponatremia.  Started on normal saline.      6/22 patient seen and examined.  She feels a little better than on admission.  Regarding her acute kidney injury creatinine still elevated at 1.49 but improved to 1.91.  Continue IV fluid.  Her INR is elevated at 6.88 but no bleeding.  Will continue to closely monitor.  Hold off warfarin for now.  Regarding UTI urine culture pending so far.  Continue Rocephin.    Today 6/23 patient seen and examined.  She is having some palpitation this morning.  No nausea or vomiting.  She also feels weak.  Heart rate is in the 170s.  We are giving early her metoprolol 200 mg twice a day and Cardizem  mg twice a day.  She is also on digoxin and has a therapeutic level in her blood.  If heart rate does not improve may need to give additional IV metoprolol or increase Cardizem CD dose.  Regarding acute kidney injury creatinine has improved at 1.09 and patient is off IV fluid now.  Will resume  home Lasix at 40 mg daily for her CHF.  INR remains elevated at 5.59 but is improving from 6.98 the day prior.  No bleeding.        Physical Exam   Vital Signs: Temp: 98.4  F (36.9  C) Temp src: Tympanic BP: 112/85 Pulse: (!) 172   Resp: 20 SpO2: 95 % O2 Device: Nasal cannula Oxygen Delivery: 2 LPM  Weight: 165 lbs 5.52 oz    Constitutional: Alert and conversant. NAD   HENT: NCAT   Eyes: Normal pupils   Neck: supple   CV: No pallor, regular rate and rhythm  Pulmonary/Chest: Non-labored respirations, nasal cannula in place, wheezing  Abdominal: non-distended   MSK: ENCISO.  Bilateral lower extremity edema  Neuro: Alert and appropriate   Skin: Warm and dry. No diaphoresis. No rashes on exposed skin    Psych: Appropriate mood and affect            Medical Decision Making       40 MINUTES SPENT BY ME on the date of service doing chart review, history, exam, documentation & further activities per the note.      Data

## 2024-06-24 NOTE — PHARMACY-MEDICATION REGIMEN REVIEW
Pharmacy Antimicrobial Stewardship Assessment     Current Antimicrobial Therapy:  Anti-infectives (From now, onward)      Start     Dose/Rate Route Frequency Ordered Stop    24 1900  cefTRIAXone IN D5W (ROCEPHIN) intermittent infusion 2 g         2 g  100 mL/hr over 30 Minutes Intravenous EVERY 24 HOURS 24 1825              Indication: UTI    Days of Therapy: 4     Pertinent Labs:    Recent Labs   Lab Test 24  0509 24  0517 24  0518 24  1344 24  0926 24  1336 24  1425   WBC 6.8 10.0 8.8 8.5 7.2 7.2 6.8       Recent Labs   Lab Test 24  0620 24  0320 21  0444 21  1412 21  0522   LACT 1.5 2.3*   < > 2.2*  --    CRPI  --  10.67*  --   --   --    PCAL  --   --   --  <0.05 <0.05    < > = values in this interval not displayed.        Temperature:  Temp (24hrs), Av  F (36.7  C), Min:96.9  F (36.1  C), Max:98.4  F (36.9  C)      Culture Results:   30-Day Micro Results       Collected Updated Procedure Result Status      2024 1416 2024 0930 Urine Culture [[id number]]    Urine, Midstream    Final result Component Value   Culture >100,000 CFU/mL Mixture of Urogenital Lisha                          Recommendations/Interventions:  No recommendations at this time. Will continue to monitor.     Mariely Benavides RPH  2024

## 2024-06-24 NOTE — PHARMACY-ANTICOAGULATION SERVICE
Pharmacy Consult-Warfarin Assessment Day #4    Kerri Gonsales is a 79 year old female admitted on 6/21/2024 with <principal problem not specified>    Primary Indication(s) for Anticoagulation: A-fib     Goal INR: 2-3     FYI, patient is followed by the [facility]tico[facility] at: [facility]     Patient previously anticoagulated on Coumadin at a dose of 22.5 mg/week; dosed as: 5 mg Monday and Friday, 2.5 mg ROW Patient takes in the AM around 11     Home tablet strength(s): 5 mg     Factors that may increase patient's bleeding risk and/or sensitivity to warfarin (Coumadin) include: Advanced age, gemcitabine, hematuria, loose stools       Factors that may decrease patient's bleeding risk and/or sensitivity to warfarin (Coumadin) include: -       Anticoagulation Dose History  More data exists         6/21/2024 6/22/2024 6/23/2024 6/24/2024   Recent Dosing and Labs   INR 4.93  6.88  5.59  6.47        CBC RESULTS:   Recent Labs   Lab Test 06/24/24  0509   HGB 9.6*   *       Assessment/Plan: Continue to hold warfarin today. Check INR with AM labs.     Thank You for the Consult. Will continue to follow.    Mariely Benavides RPH ....................  6/24/2024   7:20 AM

## 2024-06-24 NOTE — PROVIDER NOTIFICATION
HR sustaining in 130-170's for approx 45 mins. /64. Provider was notified. Instructed to give 0800 dose of cardizem and metoprolol.

## 2024-06-24 NOTE — PLAN OF CARE
Pt is AO.  SBA with GB and cane. Chronically on 2L O2 sats 90-96%, occasionally dip throughout the day but would rebound to 90's quickly. HRR irregular 70-80's.  Paste applied to to bottom.  Outputs as charted. Morphine given x 1 IV for SOB, appears effective.  Makes needs known, Call light within reach, wheels locked, ID band on. Denies pain throughout shift. IV  NS @75ml/hr, ABX rocephin     Face to face report given with opportunity to observe patient.    Report given to MICHAEL Mathias, RN on 6/24/2024 at 3:00 PM

## 2024-06-24 NOTE — PROGRESS NOTES
[facility]    Hospitalist Progress Note    Hospital course  Kerri Gonsales is a 79 year old female With established diagnosis of atrial fibrillation, CHF, hypertension, coronary disease, metastatic uterine cancer who presented to the hospital complaining of decreased urine output, low oral intake, and having some blood in her pants.  She denies fever, chills.  She also has some pubic pain.  Upon arrival temperature was 98.1, respiratory 20, pulse 137, blood pressure 112/58 and O2 sats 90% on room air.    On admission sodium is low at 129, creatinine elevated at 1.9 from a baseline of 0.9, UA is positive for UTI, INR elevated at 4.93 CT abdomen pelvis reported Worsening metastatic disease with interval enlargement of numerous solid nodules and masses in the lung bases and development of  bilateral adrenal gland nodules. No evidence of acute abnormality of the ureters or bladder. No evidence of hydronephrosis.     Patient assessed as having acute kidney injury, UTI, hyponatremia, supratherapeutic INR, possibly metastatic uterine cancer     For acute kidney injury patient started on IV fluid.  Was given 1 L bolus in the ER and started patient on normal saline at 75 mm every hour for the next 24 hours.  We will be carefully assessing fluid status due to CHF.  We also held Lasix and losartan.     Regarding UTI patient started on Rocephin.     For hyponatremia.  Started on normal saline.      6/22 patient seen and examined.  She feels a little better than on admission.  Regarding her acute kidney injury creatinine still elevated at 1.49 but improved to 1.91.  Continue IV fluid.  Her INR is elevated at 6.88 but no bleeding.  Will continue to closely monitor.  Hold off warfarin for now.  Regarding UTI urine culture pending so far.  Continue Rocephin.     Today 6/23 patient seen and examined.  She is having some palpitation this morning.  No nausea or vomiting.  She also feels weak.  Heart rate is in the 170s.   We are giving early her metoprolol 200 mg twice a day and Cardizem  mg twice a day.  She is also on digoxin and has a therapeutic level in her blood.  If heart rate does not improve may need to give additional IV metoprolol or increase Cardizem CD dose.  Regarding acute kidney injury creatinine has improved at 1.09 and patient is off IV fluid now.  Will resume home Lasix at 40 mg daily for her CHF.  INR remains elevated at 5.59 but is improving from 6.98 the day prior.  No bleeding.           Assessment & Plan     Acute kidney injury  -79 year old female With established diagnosis of metastatic uterine cancer who presented to the hospital complaining of decreased urine output, low oral intake, and having some blood in her pants.    -On admission  creatinine elevated at 1.9 from a baseline of 0.9,   -UA is positive for UTI,   - CT abdomen pelvis reported Worsening metastatic disease with interval enlargement of numerous solid nodules and masses in the lung bases and development of bilateral adrenal gland nodules. No evidence of acute abnormality of the ureters or bladder. No evidence of hydronephrosis.  - patient started on IV fluid.   - Was given 1 L bolus in the ER and started patient on normal saline at 75 ml every hour for the next 24 hours.   - We will be carefully assessing fluid status due to CHF.  -  We also held Lasix and losartan.  -Today 06/23 creatinine has improved at 1.09.  -Patient is of IV fluid now  -Will resume home Lasix 40 mg daily  -6/24: BUN/creatinine are little higher today 41/1.14.  Her Lasix was restarted and IV fluid discontinued.  Will hold her Lasix today.  She does have dependent edema of her legs.1-2+ however she is sitting upright in the chair all time.  Will not add any extra fluid at this point just recheck things.     Possible UTI  -UA positive for UTI  -on Rocephin  -6/24: Cultures growing multiple bugs suggesting that this is a contaminant.  No fevers.  Discontinue  Rocephin      Hyponatremia  -Sodium low at 129  -Patient on normal saline  -Today 6/23 sodium is 133  -6/24: Serum sodium 132 today.  Continue to follow.       Dyspnea /acute respiratory failure with hypoxia/history of COPD:  -on home inhalers  -on  home oxygen  -6/24: She is on 2 L with O2 sats 95%.  Afebrile.  Should be noted that she does have multiple very large pulmonary mets..  She is getting nebs every 4-6 hours they do make her feel better however she has never had any wheezing.  Does have a dry nonproductive cough.  No obvious evidence of pneumonia.  Her major complaint is that she is short of breath with any minimal movement.  She is sitting up in the chair and of almost tripoding.  She lays back she feels too short of breath.  I think the predominant reason is her widely metastatic uterine cancer she has multiple nodules very large in her lungs.  Does have some underlying COPD.  Do not feel there is any pneumonia volume wise she is difficult to sort out.  I do not think Lasix can help a lot.  I talked to her about her CODE STATUS.  Went over exactly what it means.  She was very adamant she does not want to be intubated.  And if the end happens since opiate.  Up until that time are going to try and make her feel better and get her out of the hospital.  So she will be DNR/DNI status at this time.  Because of her dyspnea I am going to trial a bit of morphine will try some IV couple milligrams every 3-4 hours as needed and see if that does help her.      Metastatic uterine carcinoma:  -Follow-up with medical oncology in outpatient setting  -Pain control with oxygen diet  -6/24: Patient has widely metastatic uterine cancer lungs bone thyroid adrenal.  Has just started chemotherapy.  Got her first dose of gemcitabine on 6/19.  White count stable at 6800 platelets 148,000.  Patient does have widely metastatic disease.  Did discuss CODE STATUS as above she is now DNR/DNI.  Received her first dose of gemcitabine  on 619.  Do not feel that she is in any shape to get any further rounds of chemotherapy at this point.  Will touch base with her primary care oncologist she sees Selene Varela and Dr. Bruce.     Chronic atrial fibrillation with rapid ventricular response:  -Resume home Cardizem and metoprolol  -Patient is on warfarin but INR elevated at 4.9  -Pharmacy will hold warfarin for now  -6/22 INR 6.88.  No bleeding  -Continue holding warfarin by pharmacy  -resume home metoprolol, cardizem CD  -Today 6/23 heart rate was in the 170s this morning  -We are giving home metoprolol 200 mg twice daily and Cardizem CD1 20 mg twice daily  -Patient is also on digoxin.  -Will reevaluate heart rate throughout the day and may need to increase Cardizem CD dose.  -6/24: Rates have been now higher up and down this morning she was up to 130-170 bpm.  Blood pressure was 130/64.  Afebrile.  She is on metoprolol 200 twice daily tartrate Cardizem  twice a day.  Will bump her up to 360 daily.  Home but looks like she got her 120 early.  Give her to 40 to make it to 360 and just once a day.  If her rate cannot be controlled adequately may need to consider amiodarone although given her current oncological issues would rather not.  Will check an echocardiogram today just to assess her LV function and help with treatment.    Supratherapeutic INR  -6/24: Patient has continued elevated INR 6.47.  Obviously we will be holding this.  Hemoglobin stable 9.6.        Congestive heart failure:   Not sure exactly where this diagnosis came from.  BNP in the past has been elevated.  Last echo was quite sometime ago showing EF from 50 to 55%.  Her Lasix was started yesterday.  However renal functions got little worse we will hold this at this point to assess her further for failure.  Try get an echo on her today sitting upright this can be difficult I would just do a limited trying to get a couple views of her LV function if feasible.      Diet: Combination  Diet Low Saturated Fat Na <2400mg Diet, No Caffeine Diet  Fluids: None    DVT Prophylaxis: Warfarin  Code Status: No CPR- Do NOT Intubate    Disposition: Expected discharge in 2-3 days once pulmonary shrestha stable.    Cyril Velasquez MD    Interval History   Patient is sitting upright and hunched over in chair.  Complains mainly of being very dyspneic.  Any movement laying back extremely dyspneic.  No fevers.  Her A-fib is not well-controlled vary between 70 up to 160.  Blood pressure has been stable rate around 100 systolic.  Is on 2 L sats 95%.  At this point hopefully it will be little better controlled on higher dose of Cardizem.  Her dyspnea is can be difficult to really treat and assess may be partially due to her A-fib but I think predominantly due to her pulmonary mets.  Will try some as needed morphine to see if that helps with the breathing.  Aggressive diuresis and not sure is can be much benefit her BUN/creatinine have jumped up a little bit she will hold her Lasix at this time.  Overall prognosis is not good.  Did go over CODE STATUS with her and she is DNR/DNI per her request.  I did update her granddaughter regarding her current status.  Our goal is to try and get out of the hospital but that might not be even a feasible option.    -Data reviewed today: I reviewed all new labs and imaging results over the last 24 hours. I personally reviewed no images or EKG's today.    Physical Exam   Temp: 97.3  F (36.3  C) Temp src: Tympanic BP: 95/67 Pulse: (!) 161   Resp: 20 SpO2: 93 % O2 Device: Nasal cannula Oxygen Delivery: 2 LPM  Vitals:    06/21/24 1757   Weight: 75 kg (165 lb 5.5 oz)     Vital Signs with Ranges  Temp:  [96.9  F (36.1  C)-98.3  F (36.8  C)] 97.3  F (36.3  C)  Pulse:  [] 161  Resp:  [20-22] 20  BP: ()/(47-67) 95/67  SpO2:  [91 %-96 %] 93 %  I/O last 3 completed shifts:  In: -   Out: 900 [Urine:900]    Peripheral IV 06/21/24 Right Antecubital fossa (Active)   Site Assessment WDL  06/24/24 0743   Line Status Saline locked 06/24/24 0743   Dressing Transparent 06/24/24 0743   Dressing Status clean;dry;intact 06/24/24 0743   Dressing Intervention New dressing  06/23/24 1616   Line Intervention Flushed 06/24/24 0743   Phlebitis Scale 0-->no symptoms 06/24/24 0743   Infiltration? no 06/24/24 0743   Number of days: 3       Wound Buttocks (Active)   Number of days: 1216     Port-A-Cath right upper chest area accessed    Constitutional: Alert and oriented x3. moderate    HEENT: Normocephalic/atraumatic, PERRL, EOMI, mouth moist, neck supple, no LN.     Cardiovascular:Irreg RRR. no Murmur, no  rubs, or gallops.  JVD no.  Bruits no.  Pulses 2    Respiratory: Decreased bilaterally no wheezes.  No obvious areas of consolidation.  Clear to auscultation bilaterally    Abdomen: Soft, nontender, nondistended, no organomegaly. Bowel sounds present    Extremities: Warm/dry.  Dependent 1-2+ soft pitting edema    Neuro:   Non focal, cranial nerves intact, Moves all extremities.  Medications   Current Facility-Administered Medications   Medication Dose Route Frequency Provider Last Rate Last Admin    Patient is already receiving anticoagulation with heparin, enoxaparin (LOVENOX), warfarin (COUMADIN)  or other anticoagulant medication   Does not apply Continuous PRN Heron Cuellar MD         Current Facility-Administered Medications   Medication Dose Route Frequency Provider Last Rate Last Admin    budesonide (PULMICORT) neb solution 0.5 mg  0.5 mg Nebulization BID Heron Cuellar MD   0.5 mg at 06/24/24 0715    digoxin (LANOXIN) tablet 125 mcg  125 mcg Oral Every Other Day Heron Cuellar MD   125 mcg at 06/23/24 1010    [START ON 6/25/2024] diltiazem ER COATED BEADS (CARDIZEM CD/CARTIA XT) 24 hr capsule 360 mg  360 mg Oral Daily Cyril Velasquez MD        [Held by provider] furosemide (LASIX) tablet 40 mg  40 mg Oral Daily Heron Cuellar MD   40 mg at 06/23/24 1010    levalbuterol (XOPENEX) neb solution 0.63  mg  0.63 mg Nebulization Q4H WA Heron Cuellar MD   0.63 mg at 06/24/24 1116    metoprolol tartrate (LOPRESSOR) tablet 200 mg  200 mg Oral BID Heron Cuellar MD   200 mg at 06/24/24 0531    senna-docusate (SENOKOT-S/PERICOLACE) 8.6-50 MG per tablet 1 tablet  1 tablet Oral BID Heron Cuellar MD   1 tablet at 06/22/24 0943    Or    senna-docusate (SENOKOT-S/PERICOLACE) 8.6-50 MG per tablet 2 tablet  2 tablet Oral BID Heron Cuellar MD        sodium chloride (PF) 0.9% PF flush 3 mL  3 mL Intracatheter Q8H Heron Cuellar MD   3 mL at 06/24/24 0444    Warfarin Dose Required Daily - Pharmacist Managed  1 each Oral See Admin Instructions Heron Cuellar MD        warfarin-No DOSE today  1 each Does not apply no dose today (warfarin) Cyril Velasquez MD           Data   Recent Labs   Lab 06/24/24  0509 06/23/24  0517 06/22/24  0518 06/21/24  1344   WBC 6.8 10.0 8.8 8.5   HGB 9.6* 9.7* 11.3* 11.2*   MCV 93 93 91 92   * 156 188 223   INR 6.47* 5.59* 6.88* 4.93*   * 133* 132* 129*   POTASSIUM 4.4 4.3 4.2 5.1   CHLORIDE 95* 96* 93* 89*   CO2 23 23 24 25   BUN 40.7* 33.0* 37.4* 37.8*   CR 1.14* 1.09* 1.49* 1.91*   ANIONGAP 14 14 15 15   KORI 9.3 8.7* 8.8 9.1   * 192* 188* 183*   ALBUMIN  --   --  3.3* 3.3*   PROTTOTAL  --   --  7.1 7.4   BILITOTAL  --   --  0.6 0.8   ALKPHOS  --   --  63 59   ALT  --   --  13 14   AST  --   --  29 35       No results found for this or any previous visit (from the past 24 hour(s)).

## 2024-06-24 NOTE — PROGRESS NOTES
Assessment completed with granddaughter Marcin.    LOC: patient sleeping    Dx: hypoxia    Chronic Disease Management: A-Fib, HF, HTN, goiter, Malignant neoplasm of upper lobe     Lives with: family friend  Living at:  home in [facility]  Transportation: YES, drives self or family provide    Primary PCP: Telly Bardales  Insurance:  medicare and BCBS      Support System:  family and friends  Homecare/PCA: no  /County Services:   no  : NO      How was the [facility] notification completed: n/a    Health Care Directive: no  Guardian: no  POA: no    Pharmacy: [facility]  Meds management: manages own    Adequate Resources for needs (housing, utilities, food/med): YES  Household chores: self  Work/community/social activity: NO     ADLs: independent  Ambulation: independent  Falls: independent  Nutrition: no concerns  Sleep: has always had trouble sleeping    Equipment used: nebulizer      Oxygen supplier: O2      Does the supplier have valid oxygen orders: yes    Mental health: denies  Substance abuse: denies  Exposure to violence/abuse: not asked  Stressors: not asked    Able to Return to Prior Living Arrangements:  unknown at this time    Choice of Vendor: n/a    Barriers: metastatic cancer    MARIANNE: low    Plan: unknown at this time    Chiara Stevens CM

## 2024-06-24 NOTE — PROGRESS NOTES
Saw this patient in the ED and again this morning up in the hospital.  Gave patient the [employer] Road to Recovery information that will help get her to and from her cancer appointments / infusions.  We also spoke about Meals on Wheels, which she already gets in the frozen format.  We spoke about the [employer] briefly for unmet needs.  We discussed patient Assistance programs for medications with high co-pays.  She is currently not on any at this moment, but she is early in her treatment plan/program.  If she encounters any issues or problems, she will reach out to the care coordinator or nurses and they will alert the Oncology SW or this SW to address issues.

## 2024-06-24 NOTE — PLAN OF CARE
Pt is A&O, vs and assessments as charted. Afebrile. HR tachy 130's-170's at end of shift provider was notified and morning doses of metoprolol and cardizem given (see prior note). Tele shows AFIB 100's-120's per ICU nurses charted report. Ativan given x2 with good relief. Pt was agreeable after education was provided. Slept on/off t/o night. Up in chair multiple times. Barrier cream to buttocks with each transfer. Blood was noted to site. IV SL. Bed is locked and low, call light within reach, pt makes needs known.       Face to face report given with opportunity to observe patient.    Report given to MICHAEL Akins RN   6/24/2024  6:57 AM

## 2024-06-25 NOTE — PLAN OF CARE
Goal Outcome Evaluation:      Plan of Care Reviewed With: patient, caregiver, child, family    Overall Patient Progress: no changeOverall Patient Progress: no change    A/O, lungs clear, up with assist 2, c/o anxiety and medicated per MAR with good results. Face to face report given with opportunity to observe patient.    Report given to Carolina Gasca RN   6/24/2024  7:17 PM

## 2024-06-25 NOTE — PLAN OF CARE
Goal Outcome Evaluation:      Plan of Care Reviewed With: patient, caregiver, child, family    Overall Patient Progress: decliningOverall Patient Progress: declining    A/O. C/o SOB and pain. /64. Dr Velasquez gave verbal ok to give am dose of Diltiazem, Metoprolol and Morphine after which patient became hypotensive. BP's monitored per Dr. Velasquez. Keep MAP>60. Bolus fluid given, BP remains low. Medicated for pain and has been quiet and restful all afternoon.  Face to face report given with opportunity to observe patient.    Report given to Skylar Gasca RN   6/25/2024  1915 PM

## 2024-06-25 NOTE — PLAN OF CARE
Pt is A&O. VS and assessments as charted. Afebrile. Denies pain. C/o SOB. Morphine and ativan both given x1 this shift with some relief noted. BP's soft. Night time dose of metoprolol was initially held d/t BP 70's over 40's. Provider was updated (see prior note). Tachy most of this shift ranging from 130's-170's. Once BP stabilized provider gave orders for one time dose of 100 mg Metoprolol. O2 was turned up to 3 LPM d/t SPO2 of 88% on two liters and dyspnea. Up to BSC and chair with a 1-2. Barrier cream applied frequently to sacral area with some bleeding noted. IV SL. Bed is locked and low, call light within reach. Pt makes needs known.       Face to face report given with opportunity to observe patient.    Report given to MICHAEL Singh RN   6/25/2024  7:15 AM

## 2024-06-25 NOTE — PROVIDER NOTIFICATION
BP 70's over 40's. Manual BP was 86/44. 's to 150's. 200 mg metoprolol due. Provider notified. Orders to hold metoprolol and continue to monitor VS notifying provider with changes.     0000: Provider updated on improved BP. Pt was c/o dyspnea at this time. Provider instructed this writer to give dose of morphine and recheck BP if systolic over 105 give half dose of metoprolol. Order for 100 mg of metoprolol tartate one time dose placed with BP of 135/69.

## 2024-06-25 NOTE — PROGRESS NOTES
[facility]    Hospitalist Progress Note    Hospital course    Kerri Gonsales is a 79 year old female With established diagnosis of atrial fibrillation, CHF, hypertension, coronary disease, metastatic uterine cancer who presented to the hospital complaining of decreased urine output, low oral intake, and having some blood in her pants.  She denies fever, chills.  She also has some pubic pain.  Upon arrival temperature was 98.1, respiratory 20, pulse 137, blood pressure 112/58 and O2 sats 90% on room air.     On admission sodium is low at 129, creatinine elevated at 1.9 from a baseline of 0.9, UA is positive for UTI, INR elevated at 4.93 CT abdomen pelvis reported Worsening metastatic disease with interval enlargement of numerous solid nodules and masses in the lung bases and development of  bilateral adrenal gland nodules. No evidence of acute abnormality of the ureters or bladder. No evidence of hydronephrosis.     Patient assessed as having acute kidney injury, UTI, hyponatremia, supratherapeutic INR, possibly metastatic uterine cancer     For acute kidney injury patient started on IV fluid.  Was given 1 L bolus in the ER and started patient on normal saline at 75 mm every hour for the next 24 hours.  We will be carefully assessing fluid status due to CHF.  We also held Lasix and losartan.     Regarding UTI patient started on Rocephin.     For hyponatremia.  Started on normal saline.      6/22 patient seen and examined.  She feels a little better than on admission.  Regarding her acute kidney injury creatinine still elevated at 1.49 but improved to 1.91.  Continue IV fluid.  Her INR is elevated at 6.88 but no bleeding.  Will continue to closely monitor.  Hold off warfarin for now.  Regarding UTI urine culture pending so far.  Continue Rocephin.     Today 6/23 patient seen and examined.  She is having some palpitation this morning.  No nausea or vomiting.  She also feels weak.  Heart rate is in the  170s.  We are giving early her metoprolol 200 mg twice a day and Cardizem  mg twice a day.  She is also on digoxin and has a therapeutic level in her blood.  If heart rate does not improve may need to give additional IV metoprolol or increase Cardizem CD dose.  Regarding acute kidney injury creatinine has improved at 1.09 and patient is off IV fluid now.  Will resume home Lasix at 40 mg daily for her CHF.  INR remains elevated at 5.59 but is improving from 6.98 the day prior.  No bleeding.           Assessment & Plan     Acute kidney injury  -79 year old female With established diagnosis of metastatic uterine cancer who presented to the hospital complaining of decreased urine output, low oral intake, and having some blood in her pants.    -On admission  creatinine elevated at 1.9 from a baseline of 0.9,   -UA is positive for UTI,   - CT abdomen pelvis reported Worsening metastatic disease with interval enlargement of numerous solid nodules and masses in the lung bases and development of bilateral adrenal gland nodules. No evidence of acute abnormality of the ureters or bladder. No evidence of hydronephrosis.  - patient started on IV fluid.   - Was given 1 L bolus in the ER and started patient on normal saline at 75 ml every hour for the next 24 hours.   - We will be carefully assessing fluid status due to CHF.  -  We also held Lasix and losartan.  -Today 06/23 creatinine has improved at 1.09.  -Patient is off IV fluid now  -Will resume home Lasix 40 mg daily  -6/24: BUN/creatinine are little higher today 41/1.14.  Her Lasix was restarted and IV fluid discontinued.  Will hold her Lasix today.  She does have dependent edema of her legs.1-2+ however she is sitting upright in the chair all time.  Will not add any extra fluid at this point just recheck things.  -6/25: Echocardiogram showed basically normal systolic function EF 65%.  The right ventricle was mildly reduced in function moderate left atrial enlargement  did have mild elevation of RV systolic pressure is 48+ right atrium.  Could not assess the IVC.  However today her BUN/creatinine are 57/1.87 potassium is 4.5.  Certainly she should be able to handle the fluid however with the A-fib RVR unable to get good rate control without inducing significant hypotension.  However, between a rock and a hard place with this once again with her.  Will give her 1 fluid bolus.     Possible UTI  -UA positive for UTI  -on Rocephin  -6/24: Cultures growing multiple bugs suggesting that this is a contaminant.  No fevers.  Discontinue Rocephin      Hyponatremia  -Sodium low at 129  -Patient on normal saline  -Today 6/23 sodium is 133  -6/24: Serum sodium 132 today.  Continue to follow.  6/25: 133 today.    Dyspnea /acute respiratory failure with hypoxia/history of COPD:  -on home inhalers  -on  home oxygen  -6/24: She is on 2 L with O2 sats 95%.  Afebrile.  Should be noted that she does have multiple very large pulmonary mets..  She is getting nebs every 4-6 hours they do make her feel better however she has never had any wheezing.  Does have a dry nonproductive cough.  No obvious evidence of pneumonia.  Her major complaint is that she is short of breath with any minimal movement.  She is sitting up in the chair and of almost tripoding.  She lays back she feels too short of breath.  I think the predominant reason is her widely metastatic uterine cancer she has multiple nodules very large in her lungs.  Does have some underlying COPD.  Do not feel there is any pneumonia volume wise she is difficult to sort out.  I do not think Lasix can help a lot.  I talked to her about her CODE STATUS.  Went over exactly what it means.  She was very adamant she does not want to be intubated.  And if the end happens since opiate.  Up until that time are going to try and make her feel better and get her out of the hospital.  So she will be DNR/DNI status at this time.  Because of her dyspnea I am going to  trial a bit of morphine will try some IV couple milligrams every 3-4 hours as needed and see if that does help her.  -6/25: Patient still is dyspneic.  The morphine does help significantly.  She bumped up to 3 L 94% sat.  Heart rate being elevated may be causing some of the problem.  Will give a little fluid bolus and see if this helps her pressure so we can up her medications for rate control.        Metastatic uterine carcinoma:  -Follow-up with medical oncology in outpatient setting  -Pain control with oxygen diet  -6/24: Patient has widely metastatic uterine cancer lungs bone thyroid adrenal.  Has just started chemotherapy.  Got her first dose of gemcitabine on 6/19.  White count stable at 6800 platelets 148,000.  Patient does have widely metastatic disease.  Did discuss CODE STATUS as above she is now DNR/DNI.  Received her first dose of gemcitabine on 619.  Do not feel that she is in any shape to get any further rounds of chemotherapy at this point.  Will touch base with her primary care oncologist she sees Selene Varela and Dr. Bruce.    -6/25: Patient continues to basically decline.  I will talk to oncology today her overall clinical status is such that she will not be able to consider further chemotherapy.  She has decided DNR/DNI status.  At this point probable hospice involvement is most likely beneficial course of action.  I did touch base with Selene Gentile.  Patient is in no clinical shape to even consider any further chemotherapy.  She is DNR/DNI status now.  Selene will come up and see the patient tomorrow.  My thoughts are she is likely should enter into hospice/comfort cares at this point.     Chronic atrial fibrillation with rapid ventricular response:  -Resume home Cardizem and metoprolol  -Patient is on warfarin but INR elevated at 4.9  -Pharmacy will hold warfarin for now  -6/22 INR 6.88.  No bleeding  -Continue holding warfarin by pharmacy  -resume home metoprolol, cardizem CD  -Today 6/23  heart rate was in the 170s this morning  -We are giving home metoprolol 200 mg twice daily and Cardizem CD1 20 mg twice daily  -Patient is also on digoxin.  -Will reevaluate heart rate throughout the day and may need to increase Cardizem CD dose.  -6/24: Rates have been now higher up and down this morning she was up to 130-170 bpm.  Blood pressure was 130/64.  Afebrile.  She is on metoprolol 200 twice daily tartrate Cardizem  twice a day.  Will bump her up to 360 daily.  Home but looks like she got her 120 early.  Give her to 40 to make it to 360 and just once a day.  If her rate cannot be controlled adequately may need to consider amiodarone although given her current oncological issues would rather not.  Will check an echocardiogram today just to assess her LV function and help with treatment.  -6/25: Rates are still uncontrolled she is 150s 160s pressures right around 100 systolic.  She is on diltiazem metoprolol and digoxin basically as high as we can go pressures are limiting things.  LV function is normal.  Rate control is not can to be feasible with her current regimen  Thank.  The next option is just to load her up on some amiodarone to help with rate control.  Given her hypotension.  Will give a 1 try with some IV fluids to see if this helps keep her pressure up will be give her her medications.     Supratherapeutic INR  -6/24: Patient has continued elevated INR 6.47.  Obviously we will be holding this.  Hemoglobin stable 9.6.  -6/25: INR is higher today 7.45.  No bleeding.  INR not coming down because of just no real significant intake of food.  Will give her dose of some vitamin K orally.           Congestive heart failure:   Not sure exactly where this diagnosis came from.  BNP in the past has been elevated.  Last echo was quite sometime ago showing EF from 50 to 55%.  Her Lasix was started yesterday.  However renal functions got little worse we will hold this at this point to assess her further  for failure.  Try get an echo on her today sitting upright this can be difficult I would just do a limited trying to get a couple views of her LV function if feasible.    6/25: No obvious evidence of congestive heart failure her ejection fraction is normal.  Does have some peripheral edema.  But I think most of her current symptoms are all due to her underlying severe pulmonary mets.        Diet: Combination Diet Low Saturated Fat Na <2400mg Diet, No Caffeine Diet  Fluids: None    DVT Prophylaxis: Warfarin  Code Status: No CPR- Do NOT Intubate    Disposition: Expected discharge unclear given current clinical status  Cyril Velasquez MD    Interval History   He continues to feel pretty miserable.  Landed very difficult position just because of her dyspnea.  Spoke briefly with oncology today will discuss further with patient she is not a candidate for any further chemotherapy.  Our goal is to make her as comfortable as we can.  Will try a little bit of fluid here to see if her medications can slow down her heart rate and help with things if this is a failure then I think we should strictly go to for comfort cares give her all the morphine that she wants and such.    -Data reviewed today: I reviewed all new labs and imaging results over the last 24 hours. I personally reviewed no images or EKG's today.    Physical Exam   Temp: (!) 96.7  F (35.9  C) Temp src: Tympanic BP: 107/64 Pulse: (!) 157   Resp: 20 SpO2: 94 % O2 Device: Nasal cannula Oxygen Delivery: 3 LPM  Vitals:    06/21/24 1757   Weight: 75 kg (165 lb 5.5 oz)     Vital Signs with Ranges  Temp:  [96.7  F (35.9  C)-97.4  F (36.3  C)] 96.7  F (35.9  C)  Pulse:  [] 157  Resp:  [16-22] 20  BP: ()/(42-69) 107/64  SpO2:  [91 %-96 %] 94 %  No intake/output data recorded.    Peripheral IV 06/21/24 Right Antecubital fossa (Active)   Site Assessment WDL 06/25/24 0200   Line Status Saline locked 06/25/24 0200   Dressing Transparent 06/25/24 0200   Dressing  Status clean;dry;intact 06/25/24 0200   Dressing Intervention New dressing  06/23/24 1616   Line Intervention Flushed 06/24/24 2023   Phlebitis Scale 0-->no symptoms 06/25/24 0200   Infiltration? no 06/25/24 0200   Number of days: 4       Wound Buttocks (Active)   Number of days: 1217     No line/device    Constitutional: Alert and oriented x3. Moderate      HEENT: Normocephalic/atraumatic, PERRL, EOMI, mouth moist, neck supple, no LN.     Cardiovascular:irreg tachy RRR. no Murmur, no  rubs, or gallops.  JVD no.  Bruits no.  Pulses 2    Respiratory:decreased no wheezingClear to auscultation bilaterally    Abdomen: Soft, nontender, nondistended, no organomegaly. Bowel sounds present    Extremities: 2+Warm/dry.  edema    Neuro:   Non focal, cranial nerves intact, Moves all extremities.  Medications   Current Facility-Administered Medications   Medication Dose Route Frequency Provider Last Rate Last Admin    Patient is already receiving anticoagulation with heparin, enoxaparin (LOVENOX), warfarin (COUMADIN)  or other anticoagulant medication   Does not apply Continuous PRN Heron Cuellar MD         Current Facility-Administered Medications   Medication Dose Route Frequency Provider Last Rate Last Admin    budesonide (PULMICORT) neb solution 0.5 mg  0.5 mg Nebulization BID Heron Cuellar MD   0.5 mg at 06/24/24 1924    digoxin (LANOXIN) tablet 125 mcg  125 mcg Oral Every Other Day Heron Cuellar MD   125 mcg at 06/23/24 1010    diltiazem ER COATED BEADS (CARDIZEM CD/CARTIA XT) 24 hr capsule 360 mg  360 mg Oral Daily Cyril Velasquez MD        [Held by provider] furosemide (LASIX) tablet 40 mg  40 mg Oral Daily Heron Cuellar MD   40 mg at 06/23/24 1010    levalbuterol (XOPENEX) neb solution 0.63 mg  0.63 mg Nebulization Q4H WA Heron Cuellar MD   0.63 mg at 06/24/24 1923    metoprolol tartrate (LOPRESSOR) tablet 200 mg  200 mg Oral BID Heron Cuellar MD   200 mg at 06/24/24 0531    senna-docusate  (SENOKOT-S/PERICOLACE) 8.6-50 MG per tablet 1 tablet  1 tablet Oral BID Heron Cuellar MD   1 tablet at 24 0943    Or    senna-docusate (SENOKOT-S/PERICOLACE) 8.6-50 MG per tablet 2 tablet  2 tablet Oral BID Heron Cuellar MD        sodium chloride (PF) 0.9% PF flush 3 mL  3 mL Intracatheter Q8H Heron Cuellar MD   3 mL at 24 0201    Warfarin Dose Required Daily - Pharmacist Managed  1 each Oral See Admin Instructions Heron Cuellar MD           Data   Recent Labs   Lab 24  0508 24  0509 24  0517 24  0518 24  1344   WBC 5.3 6.8 10.0 8.8 8.5   HGB 9.1* 9.6* 9.7* 11.3* 11.2*   MCV 95 93 93 91 92   * 148* 156 188 223   INR  --  6.47* 5.59* 6.88* 4.93*   NA  --  132* 133* 132* 129*   POTASSIUM  --  4.4 4.3 4.2 5.1   CHLORIDE  --  95* 96* 93* 89*   CO2  --     BUN  --  40.7* 33.0* 37.4* 37.8*   CR  --  1.14* 1.09* 1.49* 1.91*   ANIONGAP  --  14 14 15 15   KORI  --  9.3 8.7* 8.8 9.1   GLC  --  203* 192* 188* 183*   ALBUMIN  --   --   --  3.3* 3.3*   PROTTOTAL  --   --   --  7.1 7.4   BILITOTAL  --   --   --  0.6 0.8   ALKPHOS  --   --   --  63 59   ALT  --   --   --  13 14   AST  --   --   --  29 35       Recent Results (from the past 24 hour(s))   Echocardiogram Complete   Result Value    LVEF  60-65%    [facility]    [id number]  [id number]  [id number]  [id number]^CHERRIE^[facility]  Echocardiography Laboratory  [address] [address]     Name: SHARRON BELLAMY  MRN: [id number]  : 1945  Study Date: 2024 08:10 AM  Age: 79 yrs  Gender: Female  Patient Location: [facility]  Reason For Study: CHF  History: CHF  Ordering Physician: JONAS GRIER  Performed By: Brandi Evans RDCS     BSA: 1.8 m2  Height: 64 in  Weight: 165 lb  ______________________________________________________________________________  Procedure  Complete Portable Echo Adult. The patient's rhythm is atrial fibrillation. At  times had RVR  rate up to 140 bpm.  ______________________________________________________________________________  Interpretation Summary  Global and regional left ventricular function is normal with an EF of 60-65%.  Global right ventricular function is mildly reduced.  Moderate left atrial enlargement is present.  Mild mitral insufficiency is present.  Mild aortic insufficiency is present.  Mild tricuspid insufficiency is present.  The right ventricular systolic pressure is 48mmHg above the right atrial  pressure.  Mild pulmonic insufficiency is present.  At times had RVR rate up to 140 bpm  ______________________________________________________________________________  Left Ventricle  Global and regional left ventricular function is normal with an EF of 60-65%.     Right Ventricle  Global right ventricular function is mildly reduced.     Atria  Moderate left atrial enlargement is present.     Mitral Valve  Mild mitral insufficiency is present.     Aortic Valve  Mild aortic insufficiency is present. The mean AoV pressure gradient is 2.1  mmHg. The peak AoV pressure gradient is 4.0 mmHg. No aortic stenosis is  present.     Tricuspid Valve  Mild tricuspid insufficiency is present. The right ventricular systolic  pressure is 48mmHg above the right atrial pressure.     Pulmonic Valve  Mild pulmonic insufficiency is present.     Vessels  Ascending aorta 2.91 cm.     Pericardium  No pericardial effusion is present.     ______________________________________________________________________________  MMode/2D Measurements & Calculations  IVSd: 0.98 cm  LVIDd: 3.9 cm  LVIDs: 2.5 cm  LVPWd: 1.0 cm  FS: 34.9 %  LV mass(C)d: 119.8 grams  LV mass(C)dI: 66.5 grams/m2  Ao root diam: 3.3 cm  LA dimension: 4.2 cm     asc Aorta Diam: 2.9 cm  LA/Ao: 1.3  LVOT diam: 2.1 cm  LVOT area: 3.4 cm2  Ao root diam index Ht(cm/m): 2.0  Ao root diam index BSA (cm/m2): 1.8  Asc Ao diam index BSA (cm/m2): 1.6  Asc Ao diam index Ht(cm/m): 1.8  EF Biplane: 61.3  %  LA Volume Indexed (AL/bp): 44.0 ml/m2  RWT: 0.52     Doppler Measurements & Calculations  Ao V2 max: 100.0 cm/sec  Ao max P.0 mmHg  Ao V2 mean: 69.0 cm/sec  Ao mean P.1 mmHg  Ao V2 VTI: 18.6 cm  ALEX(I,D): 2.2 cm2  ALEX(V,D): 2.3 cm2  LV V1 max P.8 mmHg  LV V1 max: 67.0 cm/sec  LV V1 VTI: 12.1 cm  SV(LVOT): 41.0 ml  SI(LVOT): 22.8 ml/m2  TR max eldon: 347.0 cm/sec  TR max P.1 mmHg  AV Eldon Ratio (DI): 0.67  ALEX Index (cm2/m2): 1.2     ______________________________________________________________________________  Report approved by: Annemarie Saucedo 2024 02:06 PM

## 2024-06-25 NOTE — PHARMACY-ANTICOAGULATION SERVICE
Pharmacy Consult-Warfarin Assessment Day #5    Kerri Gonsales is a 79 year old female admitted on 6/21/2024 with <principal problem not specified>    Primary Indication(s) for Anticoagulation: A-fib     Goal INR: 2-3     FYI, patient is followed by the Anticoagulation/Protime Clinic at: [facility]     Patient previously anticoagulated on Coumadin at a dose of 22.5 mg/week; dosed as: 5 mg Monday and Friday, 2.5 mg ROW Patient takes in the AM around 11     Home tablet strength(s): 5 mg     Factors that may increase patient's bleeding risk and/or sensitivity to warfarin (Coumadin) include: Advanced age, gemcitabine (given 6/19/24), hematuria, loose stools       Factors that may decrease patient's bleeding risk and/or sensitivity to warfarin (Coumadin) include: -       Anticoagulation Dose History  More data exists         6/21/2024 6/22/2024 6/23/2024 6/24/2024 6/25/2025   Recent Dosing and Labs    INR 4.93  6.88  5.59  6.47  7.45           CBC RESULTS:   Recent Labs   Lab Test 06/24/24  0509   HGB 9.6*   *       Assessment/Plan: Continue to hold warfarin today. Check INR with AM labs.     Thank You for the Consult. Will continue to follow.    Jared Taveras [facility] on 6/25/2024 at 8:00 AM

## 2024-06-26 PROBLEM — N17.0 ACUTE KIDNEY FAILURE WITH TUBULAR NECROSIS (H): Status: ACTIVE | Noted: 2024-01-01

## 2024-06-26 NOTE — PLAN OF CARE
Goal Outcome Evaluation:       Patient lethargic, confused to time and situation. Large change in LOC at 2020- see provider notification note. Patient transferred to ICU. Granddaughter Marcin notified of transfer and change in patient condition.    VSS, afebrile. LS diminished to bases. On 3L O2, sats high 90's. HR irregular. Patient did not receive NOC metoprolol due to decreased LOC. +3 edema to BLE. Barrier cream applied to buttocks due to excoriation. IV SL. Turned and repo q2H.    Face to face report given with opportunity to observe patient.    Report given to MICHAEL Lubin RN   6/25/2024  11:20 PM

## 2024-06-26 NOTE — PROGRESS NOTES
Patient more lethargic. ABG shows repiratory acidosis . Order placed for bipap and transfer to ICU. Start with IPAP OF 14 and EPAP of 7. Repeat ABG at 3 am.

## 2024-06-26 NOTE — PROGRESS NOTES
Care Transitions focused note:      Call placed to [facility] to see if patient qualifies for inpatient Hospice, LVM.    Chiara Stevens CM

## 2024-06-26 NOTE — PLAN OF CARE
Family has notified they are ready to move toward comfort cares and remove the Bipap mask. Dr. Cuellar notified; comfort care orders rec'd.  2mg morphine administered prior to removing Bipap mask, mask removed at 1756 in addition to all monitoring and levophed drip.

## 2024-06-26 NOTE — PLAN OF CARE
Patient transferred to ICU and was unresponsive. Placed on bipap and levophed started. Patient started to become alert and follow commands after an hour of bipap. Heart rates 50s-140s. Bps all over the place with the Afib. Levo is currently at 0.1mcg/kg/hr. One incontinent void. Purewick placed. Oxycodone given x1. When bipap was taken off for the pill sats dropped into 70s but quickly rebounded. IV in right arm infiltrated. MD made aware. Protocol followed. Dr. Benavidez ordered to not give antidote.     Face to face report given with opportunity to observe patient.    Report given to MICHAEL Lobo RN   6/26/2024  7:21 AM

## 2024-06-26 NOTE — PROGRESS NOTES
I spoke with Dr. Velasquez about this critically ill patient. She is currently DNR/DNI, requiring moderate amount of vasopressor. No lactate result on this admission. Constant bipap needed over the last many hours. With that, still with a component of acute respiratory acidosis. Can consider increasing TV by decreasing EPAP to 5 as her SpO2 is good and/or increasing IPAP further. However, it is important to note that bipap is not an end of life treatment option. It is uncomfortable, can cause injury, and will not ultimately solve the underlying issue. From a ethical and compassionate perspective, I agree completely with Dr. Velasquez's ongoing efforts to help family understand this and allow him to remove the bipap from the patient and help her simply feel comfortable. Overall, patient has worsening MOD. Patient's family moving toward comfort care. Dr. Velasquez noted that he feels comfortable continuing care without tele-ICU following given patient's grim prognosis and GOC discussions with family.    If you have any concerns whatsoever and would like our service to resume following the patient, please do not hesitate to contact tele-ICU.    *I did notice that the diltiazem  mg is still on the MAR. Given her hypotension and NE requirement, I recommend this be completely discontinued. Digoxin is ok to keep on.*      Juliana Hardy MD  Critical Care Medicine    At the time of my review I was able to access to the patient s medical records/chart but not personally see or evaluate the patient. I cannot provide direct medical advice or consultation, but can provide a general opinion for consideration by the in-person treating provider. The contents of this note are not meant to replace or supersede the clinical judgement of the in-person treating provider.      Tele-hub will continue to follow. Please call with questions

## 2024-06-26 NOTE — PROGRESS NOTES
Patient continues to not do very well.  Relative hypotension A-fib with intermittent rapid ventricular spots.  Temp 97 7 sats 97% on BiPAP.  She has been minimally more responsive this afternoon.  Oncology did come by and speak with the family.  Recommending comfort as primary goal.  At this point going any higher on the Levophed makes absolutely no sense would not do so.  Any any other interventions seem appropriate also.  Family at this point is continuing with the BiPAP.  She is starting a little mottled.  They are having a difficult time with her current status.  Still believe at this point her goal is comfort.  She is DNR/DNI.  Does have morphine ordered.  Has not required it due to her severe fatigue and basically sleeping.  They have expressed interest in speaking with oncology again.  The note has been sent out to [facility] once again.  I believe the patient is actively dying.  Will continue with her support at this point however any further escalation is not indicated.

## 2024-06-26 NOTE — PROVIDER NOTIFICATION
MD notified: Patient is very lethargic, will not wake up enough to take PO meds. Vitals are stable. Will check glucose. Labored respirations, prolonged expiratory phase. Do you want to recheck ABGs or VBGs?     MD ordered ABGs

## 2024-06-26 NOTE — PLAN OF CARE
Respiratory rate remains 14 breaths per minute at this point.  She is calm and appears comfortable.  Patient surrounded by supportive family.       on-call Rev. Anderson called per family request and plans to come in to visit with patient and family.      Face to face report given with opportunity to observe patient.    Report given to MICHAEL Lubin.    Sadiq Berkowitz RN   6/26/2024  7:10 PM

## 2024-06-26 NOTE — PHARMACY-ANTICOAGULATION SERVICE
Pharmacy Consult-Warfarin Assessment Day #6    Kerri Gonsales is a 79 year old female admitted on 6/21/2024 with <principal problem not specified>    Primary Indication(s) for Anticoagulation: A-fib     Goal INR: 2-3     FYI, patient is followed by the Anticoagulation[facility] at: [facility]     Patient previously anticoagulated on Coumadin at a dose of 22.5 mg/week; dosed as: 5 mg Monday and Friday, 2.5 mg ROW Patient takes in the AM around 11     Home tablet strength(s): 5 mg     Factors that may increase patient's bleeding risk and/or sensitivity to warfarin (Coumadin) include: Advanced age, gemcitabine (given 6/19/24), hematuria, loose stools       Factors that may decrease patient's bleeding risk and/or sensitivity to warfarin (Coumadin) include: -       Anticoagulation Dose History  More data exists         6/21/2024 6/22/2024 6/23/2024 6/24/2024 6/25/2025 6/26/2025   Recent Dosing and Labs     INR 4.93  6.88  5.59  6.47  7.45     8.63        CBC RESULTS:   Recent Labs   Lab Test 06/24/24  0509   HGB 9.6*   *       Assessment/Plan: Continue to hold warfarin today. Check INR with AM labs.     Thank You for the Consult. Will continue to follow.    Jared Taveras [facility] on 6/25/2024 at 8:00 AM

## 2024-06-26 NOTE — PROGRESS NOTES
[facility]    Hospitalist Progress Note    Kerri Gonsales is a 79 year old female With established diagnosis of atrial fibrillation, CHF, hypertension, coronary disease, metastatic uterine cancer who presented to the hospital complaining of decreased urine output, low oral intake, and having some blood in her pants.  She denies fever, chills.  She also has some pubic pain.  Upon arrival temperature was 98.1, respiratory 20, pulse 137, blood pressure 112/58 and O2 sats 90% on room air.     On admission sodium is low at 129, creatinine elevated at 1.9 from a baseline of 0.9, UA is positive for UTI, INR elevated at 4.93 CT abdomen pelvis reported Worsening metastatic disease with interval enlargement of numerous solid nodules and masses in the lung bases and development of  bilateral adrenal gland nodules. No evidence of acute abnormality of the ureters or bladder. No evidence of hydronephrosis.     Patient assessed as having acute kidney injury, UTI, hyponatremia, supratherapeutic INR, possibly metastatic uterine cancer     For acute kidney injury patient started on IV fluid.  Was given 1 L bolus in the ER and started patient on normal saline at 75 mm every hour for the next 24 hours.  We will be carefully assessing fluid status due to CHF.  We also held Lasix and losartan.     Regarding UTI patient started on Rocephin.     For hyponatremia.  Started on normal saline.            Assessment & Plan     Acute kidney injury  -79 year old female With established diagnosis of metastatic uterine cancer who presented to the hospital complaining of decreased urine output, low oral intake, and having some blood in her pants.    -On admission  creatinine elevated at 1.9 from a baseline of 0.9,   -UA is positive for UTI,   - CT abdomen pelvis reported Worsening metastatic disease with interval enlargement of numerous solid nodules and masses in the lung bases and development of bilateral adrenal gland nodules. No  evidence of acute abnormality of the ureters or bladder. No evidence of hydronephrosis.  - patient started on IV fluid.   - Was given 1 L bolus in the ER and started patient on normal saline at 75 ml every hour for the next 24 hours.   - We will be carefully assessing fluid status due to CHF.  -  We also held Lasix and losartan.  -Today 06/23 creatinine has improved at 1.09.  -Patient is off IV fluid now  -Will resume home Lasix 40 mg daily  -6/24: BUN/creatinine are little higher today 41/1.14.  Her Lasix was restarted and IV fluid discontinued.  Will hold her Lasix today.  She does have dependent edema of her legs.1-2+ however she is sitting upright in the chair all time.  Will not add any extra fluid at this point just recheck things.  -6/25: Echocardiogram showed basically normal systolic function EF 65%.  The right ventricle was mildly reduced in function moderate left atrial enlargement did have mild elevation of RV systolic pressure is 48+ right atrium.  Could not assess the IVC.  However today her BUN/creatinine are 57/1.87 potassium is 4.5.  Certainly she should be able to handle the fluid however with the A-fib RVR unable to get good rate control without inducing significant hypotension.  However, between a rock and a hard place with this once again with her.  Will give her 1 fluid bolus.  -6/26: Overnight she became more lethargic with significant CO2 retention.  Was moved to ICU placed on BiPAP.  Hemodynamically she is been very marginal.  Did give her a little fluid bolus yesterday.  Her renal function has not worsened significantly.  With a BUN of 71 creatinine 2.80 sodium is 5.3.  Unfortunately were having significant issues here with blood pressure also.  She had been on some minimal Levophed.  Her prognosis is extremely grim.  I did discuss with the patient regarding potential more comfort measures.  She is very open to that issue.  Will need to discuss it further with family.  She has been DNR/DNI.   For now in terms of renal function will monitor.     Possible UTI  -UA positive for UTI  -on Rocephin  -6/24: Cultures growing multiple bugs suggesting that this is a contaminant.  No fevers.  Discontinue Rocephin      Hyponatremia  -Sodium low at 129  -Patient on normal saline  -Today 6/23 sodium is 133  -6/24: Serum sodium 132 today.  Continue to follow.  6/25: 133 today.  -6/26: Serum sodium was 133.     Dyspnea /acute respiratory failure with hypoxia/history of COPD:  -on home inhalers  -on  home oxygen  -6/24: She is on 2 L with O2 sats 95%.  Afebrile.  Should be noted that she does have multiple very large pulmonary mets..  She is getting nebs every 4-6 hours they do make her feel better however she has never had any wheezing.  Does have a dry nonproductive cough.  No obvious evidence of pneumonia.  Her major complaint is that she is short of breath with any minimal movement.  She is sitting up in the chair and of almost tripoding.  She lays back she feels too short of breath.  I think the predominant reason is her widely metastatic uterine cancer she has multiple nodules very large in her lungs.  Does have some underlying COPD.  Do not feel there is any pneumonia volume wise she is difficult to sort out.  I do not think Lasix can help a lot.  I talked to her about her CODE STATUS.  Went over exactly what it means.  She was very adamant she does not want to be intubated.  And if the end happens since opiate.  Up until that time are going to try and make her feel better and get her out of the hospital.  So she will be DNR/DNI status at this time.  Because of her dyspnea I am going to trial a bit of morphine will try some IV couple milligrams every 3-4 hours as needed and see if that does help her.  -6/25: Patient still is dyspneic.  The morphine does help significantly.  She bumped up to 3 L 94% sat.  Heart rate being elevated may be causing some of the problem.  Will give a little fluid bolus and see if this  helps her pressure so we can up her medications for rate control.  -6/26: Patient was extremely dyspneic yesterday.  Did worsen overnight she was getting some morphine for comfort she became more lethargic.  Gases did show significant CO2 retention pH is 7.10 pCO2 87.  Night coverage did move her to the ICU and placed her on BiPAP.  Repeat gas had a pH 7.29 pCO2 of 50 pO2 of 94.  She became more alert.  Is currently on the BiPAP this morning.        Metastatic uterine carcinoma:  -Follow-up with medical oncology in outpatient setting  -Pain control with oxygen diet  -6/24: Patient has widely metastatic uterine cancer lungs bone thyroid adrenal.  Has just started chemotherapy.  Got her first dose of gemcitabine on 6/19.  White count stable at 6800 platelets 148,000.  Patient does have widely metastatic disease.  Did discuss CODE STATUS as above she is now DNR/DNI.  Received her first dose of gemcitabine on 619.  Do not feel that she is in any shape to get any further rounds of chemotherapy at this point.  Will touch base with her primary care oncologist she sees Selene Varela and Dr. Bruce.   -6/25: Patient continues to basically decline.  I will talk to oncology today her overall clinical status is such that she will not be able to consider further chemotherapy.  She has decided DNR/DNI status.  At this point probable hospice involvement is most likely beneficial course of action.  I did touch base with Selene Varela Patient is in no clinical shape to even consider any further chemotherapy.  She is DNR/DNI status now.  Selene will come up and see the patient tomorrow.  My thoughts are she is likely should enter into hospice/comfort cares at this point.    -6/26: Patient continues to decline with worsening renal function blood pressures are marginal respiratory status is poor.  She is getting worse due to her underlying very aggressive uterine cancer.  What were doing to her now is not going to fix her.  She is going  to decline further as her cancer progresses we are unable to treat her with chemotherapy due to her clinical state.  She is DNR/DNI status of discussion with the patient.  Will discuss further with family when they come in today my things are we should make her as comfortable as possible.  The interventions we are doing are not going to change the big picture of things.  After discussion my tentative plan would be to remove the BiPAP and placed on comfort cares.     Chronic atrial fibrillation with rapid ventricular response:  -Resume home Cardizem and metoprolol  -Patient is on warfarin but INR elevated at 4.9  -Pharmacy will hold warfarin for now  -6/22 INR 6.88.  No bleeding  -Continue holding warfarin by pharmacy  -resume home metoprolol, cardizem CD  -Today 6/23 heart rate was in the 170s this morning  -We are giving home metoprolol 200 mg twice daily and Cardizem CD1 20 mg twice daily  -Patient is also on digoxin.  -Will reevaluate heart rate throughout the day and may need to increase Cardizem CD dose.  -6/24: Rates have been now higher up and down this morning she was up to 130-170 bpm.  Blood pressure was 130/64.  Afebrile.  She is on metoprolol 200 twice daily tartrate Cardizem  twice a day.  Will bump her up to 360 daily.  Home but looks like she got her 120 early.  Give her to 40 to make it to 360 and just once a day.  If her rate cannot be controlled adequately may need to consider amiodarone although given her current oncological issues would rather not.  Will check an echocardiogram today just to assess her LV function and help with treatment.  -6/25: Rates are still uncontrolled she is 150s 160s pressures right around 100 systolic.  She is on diltiazem metoprolol and digoxin basically as high as we can go pressures are limiting things.  LV function is normal.  Rate control is not can to be feasible with her current regimen  Thank.  The next option is just to load her up on some amiodarone to  help with rate control.  Given her hypotension.  Will give a 1 try with some IV fluids to see if this helps keep her pressure up will be give her her medications.  -6/26: Still in A-fib rates are little bit better today.  Pressures have been low she has been on Levophed with pressures in the right around 100 systolic.  Have had to hold her beta-blocker therapy.     Supratherapeutic INR  -6/24: Patient has continued elevated INR 6.47.  Obviously we will be holding this.  Hemoglobin stable 9.6.  -6/25: INR is higher today 7.45.  No bleeding.  INR not coming down because of just no real significant intake of food.  Will give her dose of some vitamin K orally.  -6/26: INR continues to climb 8.63 today.  No bleeding.  Hemoglobin stable 9.5.        Congestive heart failure:   Not sure exactly where this diagnosis came from.  BNP in the past has been elevated.  Last echo was quite sometime ago showing EF from 50 to 55%.  Her Lasix was started yesterday.  However renal functions got little worse we will hold this at this point to assess her further for failure.  Try get an echo on her today sitting upright this can be difficult I would just do a limited trying to get a couple views of her LV function if feasible.    6/25: No obvious evidence of congestive heart failure her ejection fraction is normal.  Does have some peripheral edema.  But I think most of her current symptoms are all due to her underlying severe pulmonary mets.      Diet: Combination Diet Low Saturated Fat Na <2400mg Diet, No Caffeine Diet  Fluids: None    DVT Prophylaxis: Warfarin  Code Status: No CPR- Do NOT Intubate    Disposition: Expected discharge unclear is critically ill  Cyril Velasquez MD    Interval History   Patient continues to overall decline.  Last evening she became unresponsive with significant CO2 retention.  Was moved to the ICU placed on BiPAP Levophed was initiated due to relative hypotension also.  Unfortunately I feel the patient's  prognosis is grim.  She has a very aggressive underlying metastatic uterine carcinoma.  Unable to give her any treatment or chemotherapy.  Our goals at this point I think are aimed towards keeping her as comfortable as possible.  I will discuss things further when her family comes today.  I think her best interest is to discontinue the BiPAP and moved to comfort cares.  She will succumb to her underlying aggressive malignancy.  Did have discussion with the patient's daughter and 2 granddaughters.  I did go over once again that she is not doing well.  She is deteriorated significantly both pulmonary wise kidney wise blood pressure wise.  I cannot change her underlying problem which is her metastatic uterine cancer.  There is nothing else that I can change is going to make this better.  Making her comfortable is the most logical option.  They are going to talk it over.  My tentative plan would be to stop the BiPAP as she does not like it.  Stop the Levophed.  And treat you for comfort with what ever it takes Ativan morphine etc.    -Data reviewed today: I reviewed all new labs and imaging results over the last 24 hours. I personally reviewed no images or EKG's today.    Physical Exam   Temp: 98.2  F (36.8  C) Temp src: Tympanic BP: 102/61 Pulse: 116   Resp: (!) 27 SpO2: 98 % O2 Device: BiPAP/CPAP Oxygen Delivery: 3 LPM  Vitals:    06/21/24 1757 06/26/24 0553   Weight: 75 kg (165 lb 5.5 oz) 77.5 kg (170 lb 13.7 oz)     Vital Signs with Ranges  Temp:  [96.4  F (35.8  C)-98.5  F (36.9  C)] 98.2  F (36.8  C)  Pulse:  [] 116  Resp:  [15-30] 27  BP: ()/(23-64) 102/61  FiO2 (%):  [40 %] 40 %  SpO2:  [77 %-100 %] 98 %  I/O last 3 completed shifts:  In: 833 [P.O.:330; IV Piggyback:503]  Out: -     Peripheral IV 06/26/24 Anterior;Distal;Left Lower forearm (Active)   Number of days: 0       Wound Buttocks (Active)   Number of days: 1218     No line/device    Constitutional: Patient is awake does answer questions but  says she is not having any discomfort at this time.      HEENT: Normocephalic/atraumatic, PERRL, EOMI, mouth dry, BiPAP mask in place, neck supple, no LN.     Cardiovascular: Irregular irregular RRR.  No murmur, no  rubs, or gallops.  JVD unable.  Bruits no.  Pulses 2    Respiratory: Decreased bilaterally.  No wheezes significant crackles noted      Abdomen: Soft, nontender, nondistended, no organomegaly. Bowel sounds present    Extremities: Warm/dry.  1-2+ edema.  Right antecubital area had an infiltrated IV with some Levophed.  No obvious irritation at this point.    Neuro: Alert able to move all extremities        Medications   Current Facility-Administered Medications   Medication Dose Route Frequency Provider Last Rate Last Admin    norepinephrine (LEVOPHED) 4 mg in  mL PERIPHERAL infusion  0.01-0.125 mcg/kg/min Intravenous Continuous Heron Cuellar MD 28.1 mL/hr at 06/26/24 0248 0.1 mcg/kg/min at 06/26/24 0248    Patient is already receiving anticoagulation with heparin, enoxaparin (LOVENOX), warfarin (COUMADIN)  or other anticoagulant medication   Does not apply Continuous PRN Heron Cuellar MD         Current Facility-Administered Medications   Medication Dose Route Frequency Provider Last Rate Last Admin    digoxin (LANOXIN) tablet 125 mcg  125 mcg Oral Every Other Day Heron Cuellar MD   125 mcg at 06/25/24 0803    diltiazem ER COATED BEADS (CARDIZEM CD/CARTIA XT) 24 hr capsule 360 mg  360 mg Oral Daily Heron Cuellar MD   360 mg at 06/25/24 1207    [Held by provider] furosemide (LASIX) tablet 40 mg  40 mg Oral Daily Heron Cuellar MD   40 mg at 06/23/24 1010    ipratropium (ATROVENT) 0.02 % neb solution 0.5 mg  0.5 mg Nebulization Q6H Heron Cuellar MD   0.5 mg at 06/26/24 0712    levalbuterol (XOPENEX) neb solution 0.63 mg  0.63 mg Nebulization Q6H Heron Cuellar MD   0.63 mg at 06/26/24 0712    senna-docusate (SENOKOT-S/PERICOLACE) 8.6-50 MG per tablet 1 tablet  1 tablet Oral BID Tsogmo,  MD Heron   1 tablet at 06/22/24 0943    Or    senna-docusate (SENOKOT-S/PERICOLACE) 8.6-50 MG per tablet 2 tablet  2 tablet Oral BID Heron Cuellar MD   2 tablet at 06/25/24 0803    sodium chloride (PF) 0.9% PF flush 3 mL  3 mL Intracatheter Q8H Heron Cuellar MD   3 mL at 06/25/24 0201    Warfarin Dose Required Daily - Pharmacist Managed  1 each Oral See Admin Instructions Heron Cuellar MD        warfarin-No DOSE today  1 each Does not apply no dose today (warfarin) Heron Cuellar MD           Data   Recent Labs   Lab 06/26/24  0446 06/25/24  2311 06/25/24  0508 06/24/24  0509   WBC 8.7  --  5.3 6.8   HGB 9.5*  --  9.1* 9.6*   MCV 96  --  95 93   *  --  110* 148*   INR 8.63*  --  7.45* 6.47*   *  --  133* 132*   POTASSIUM 5.3  --  4.5 4.4   CHLORIDE 95*  --  94* 95*   CO2 21*  --  23 23   BUN 71.2*  --  56.6* 40.7*   CR 2.80*  --  1.87* 1.14*   ANIONGAP 17*  --  16* 14   KORI 9.5  --  9.5 9.3   * 176* 195* 203*   ALBUMIN 2.6*  --  2.8*  --    PROTTOTAL 6.7  --  6.6  --    BILITOTAL 0.6  --  0.4  --    ALKPHOS 60  --  63  --    ALT 27  --  20  --    AST 39  --  25  --        Recent Results (from the past 24 hour(s))   XR Chest Port 1 View    Narrative    PROCEDURE:  XR CHEST PORT 1 VIEW    HISTORY: dyspnea    COMPARISON: CT chest 4/12/2024; chest radiographs 6/17/2024    FINDINGS: Single view chest radiograph    Cardiomediastinal silhouette is partially obscured, enlarged, stable.  Perihilar and peripheral interstitial opacities. Dependent mixed  opacities. Bilateral pleural effusions. No pneumothorax. Stable  bilateral pulmonary nodules and masses.    No suspicious osseous lesion or subdiaphragmatic free air.      Impression    IMPRESSION:    1.  Changes in the chest which are likely due to pulmonary vascular  congestion, including small effusions, cardiomegaly and bilateral  pulmonary opacities. Pneumonia cannot be excluded.  2.  Stable bilateral pulmonary nodules and masses.    HERBIE  MD RHEA         SYSTEM ID:  [id number]

## 2024-06-26 NOTE — PLAN OF CARE
New mottling noted to bilateral distal hands and feet.  Patient is less restless in bed after several doses of morphine and appears to be sleeping, responds with localization to minimal stimulation.  Majority of breaths are being triggered by BiPap at this point but some are spontaneous.  Discussed case with Dr. Velasquez who is choosing to not move forward with any additional interventions including added vasopressors.  5 family members at bedside including patient's grandchildren and daughter.  They are tearful, holding patient's hand, and generally appear to be supportive of each other and the patient.  They did ask about what would happen if the BiPap mask was removed, they were educated that she would likely become hypercapnic and unresponsive in short order - this is reinforcement of what Dr. Velasquez discussed with them several times today.  As of now they haven't chosen to formally move in the direction of comfort-focused care.

## 2024-06-27 NOTE — DISCHARGE SUMMARY
[facility]    Death Summary - Hospitalist Service     Date of Admission:  6/21/2024  Date of Death: 6/27/2024  Discharging Provider: Cyril Velasquez MD    Discharge Diagnoses     Metastatic uterine carcinoma   Acute renal failure  Atrial fibrillation with rapid ventricular spots  Elevated INR      Cause of death: Metastatic uterine carcinoma    Hospital Course     Patient 79-year-old female who significant history of chronic atrial fibrillation, hypertension, coronary artery disease.  Recently in the last couple months diagnosed with metastatic uterine carcinoma.  She has significant mets to her lung.  She came into the hospital with complaints of weakness decreased p.o. intake decreased urine output.  She was found to be acute renal failure creatinine up to 1.9.  INR was elevated at 4.93.  CT scan was performed which showed worsening metastatic disease compared to previously.  Numerous solid nodules masses in the lung bilateral adrenal glands.  She was brought in given IV fluids.  We had great deal of difficulty controlling her atrial fibrillation.  Rates well up in the 150 range.  Despite use of beta-blocker diltiazem and digoxin.  As we try to control her rate she will become more hypotensive.  She worsened over her hospital stay with worsening dyspnea.  Despite attempted diuresis she progressively worsened requiring more more oxygen.  We had a long discussion with the patient is decided now to be DNR/DNI.  She progressively worsened to the point where we could only give her morphine to make her more comfortable.  She did become quite hypotensive and decreased level of consciousness.  She was put on BiPAP at night as family was not present.  She did wake up somewhat after further discussion with her and with the help of oncology her prognosis is terminal based on her severe metastatic disease.  Nothing was amenable to further treatment.  At that point the family did agree with comfort cares.  BiPAP  was discontinued all the medications were discontinued.  She was kept comfortable with morphine and Ativan  today at 3:55 PM.      Cyril Velasquez MD  [facility]  ______________________________________________________________________      Significant Results and Procedures   Most Recent 3 CBC's:  Recent Labs   Lab Test 24  0446 24  0508 24  0509   WBC 8.7 5.3 6.8   HGB 9.5* 9.1* 9.6*   MCV 96 95 93   * 110* 148*     Most Recent 3 BMP's:  Recent Labs   Lab Test 24  0446 24  2311 24  0508 24  0509   *  --  133* 132*   POTASSIUM 5.3  --  4.5 4.4   CHLORIDE 95*  --  94* 95*   CO2 21*  --  23 23   BUN 71.2*  --  56.6* 40.7*   CR 2.80*  --  1.87* 1.14*   ANIONGAP 17*  --  16* 14   KORI 9.5  --  9.5 9.3   * 176* 195* 203*     Most Recent 2 LFT's:  Recent Labs   Lab Test 24  0446 24  0508   AST 39 25   ALT 27 20   ALKPHOS 60 63   BILITOTAL 0.6 0.4     Most Recent ABG:  Recent Labs   Lab Test 24  0301   PH 7.29*   PO2 94   PCO2 50*   HCO3 24   HAROON -2.6   ,   Results for orders placed or performed during the hospital encounter of 24   CT Abdomen Pelvis w/o Contrast    Narrative    CT ABDOMEN PELVIS W/O CONTRAST    CLINICAL HISTORY: Female, age 79 years,  new CHINO, uterine mass,  hydro?;    Comparison:  PET CT 2024    TECHNIQUE:  CT scanwas performed of the abdomen and pelvis without   contrast. Axial, sagittal and coronal images were reviewed. If  present, MIP and/or 3-D images were constructed by the technologist.    FINDINGS:  Small a moderate sized effusions have developed at the lung bases with  innumerable nodules/masses. These nodules and masses of increase in  size and number when compared to the prior study from 2024.    Stomach and duodenum: Moderate size hiatal hernia is unchanged. No  evidence of an acute abnormality    Liver: Low dense lesion again seen anteriorly in the inferior aspect  of the left lobe  without change.    Gallbladder: Unremarkable.    Spleen: Unremarkable.    Pancreas: Unremarkable.    Adrenal glands: Nodular enlargement of the adrenal glands has  developed since the prior study, concerning for worsening metastatic  disease.    Kidneys: No acute abnormality. Inferior displacement of the right  kidney is similar in appearance.    Ureters: The ureters are unremarkable.    Urinary bladder: Unremarkable.    Nodular enlargement of the uterus is again seen suggesting presence of  uterine fibroids.    Large and small bowel: Postoperative changes skin seen in the distal  colon. A number of diverticula of the colon are also similar in  appearance. Small bowel loops are nondistended.    The abdominal aorta and inferior vena cava demonstrate no acute  abnormality. Number of normal-sized and borderline enlarged lymph  nodes are again seen throughout the retroperitoneum.    Bony structures: No evidence of an acute abnormality. Moderately  severe degenerative changes are again seen throughout the lumbar spine  with a chronic appearing L4 compression fracture.      Impression    IMPRESSION:   Worsening metastatic disease with interval enlargement of numerous  solid nodules and masses in the lung bases and development of  bilateral adrenal gland nodules.    No evidence of acute abnormality of the ureters or bladder. No  evidence of hydronephrosis.    Additional nonacute findings as described above.      This facility minimizes radiation dose by adjusting the mA and/or kV  according to each patient size.      This CT scan was performed using one or more the following dose  reduction techniques:    -Automated exposure control,  -Adjustment of the mA and/or kV according to patient's size, and/or,  -Use of iterative reconstruction technique.    IZZY ROWELL MD         SYSTEM ID:  [id number]   XR Chest Port 1 View    Narrative    PROCEDURE:  XR CHEST PORT 1 VIEW    HISTORY: dyspnea    COMPARISON: CT chest 4/12/2024;  chest radiographs 2024    FINDINGS: Single view chest radiograph    Cardiomediastinal silhouette is partially obscured, enlarged, stable.  Perihilar and peripheral interstitial opacities. Dependent mixed  opacities. Bilateral pleural effusions. No pneumothorax. Stable  bilateral pulmonary nodules and masses.    No suspicious osseous lesion or subdiaphragmatic free air.      Impression    IMPRESSION:    1.  Changes in the chest which are likely due to pulmonary vascular  congestion, including small effusions, cardiomegaly and bilateral  pulmonary opacities. Pneumonia cannot be excluded.  2.  Stable bilateral pulmonary nodules and masses.    HERBIE WILKERSON MD         SYSTEM ID:  [id number]   Echocardiogram Complete     Value    LVEF  60-65%    Narrative    [id number]  [id number]  [id number]  [id number]^CHERRIE^JONAS     [facility]  Echocardiography Laboratory  [address] [place] [address]     Name: SHARRON BELLAMY  MRN: [id number]  : 1945  Study Date: 2024 08:10 AM  Age: 79 yrs  Gender: Female  Patient Location: [facility]  Reason For Study: CHF  History: CHF  Ordering Physician: JONAS GRIER  Performed By: Brandi Evans RDCS     BSA: 1.8 m2  Height: 64 in  Weight: 165 lb  ______________________________________________________________________________  Procedure  Complete Portable Echo Adult. The patient's rhythm is atrial fibrillation. At  times had RVR rate up to 140 bpm.  ______________________________________________________________________________  Interpretation Summary  Global and regional left ventricular function is normal with an EF of 60-65%.  Global right ventricular function is mildly reduced.  Moderate left atrial enlargement is present.  Mild mitral insufficiency is present.  Mild aortic insufficiency is present.  Mild tricuspid insufficiency is present.  The right ventricular systolic pressure is 48mmHg above the right atrial  pressure.  Mild pulmonic  insufficiency is present.  At times had RVR rate up to 140 bpm  ______________________________________________________________________________  Left Ventricle  Global and regional left ventricular function is normal with an EF of 60-65%.     Right Ventricle  Global right ventricular function is mildly reduced.     Atria  Moderate left atrial enlargement is present.     Mitral Valve  Mild mitral insufficiency is present.     Aortic Valve  Mild aortic insufficiency is present. The mean AoV pressure gradient is 2.1  mmHg. The peak AoV pressure gradient is 4.0 mmHg. No aortic stenosis is  present.     Tricuspid Valve  Mild tricuspid insufficiency is present. The right ventricular systolic  pressure is 48mmHg above the right atrial pressure.     Pulmonic Valve  Mild pulmonic insufficiency is present.     Vessels  Ascending aorta 2.91 cm.     Pericardium  No pericardial effusion is present.     ______________________________________________________________________________  MMode/2D Measurements & Calculations  IVSd: 0.98 cm  LVIDd: 3.9 cm  LVIDs: 2.5 cm  LVPWd: 1.0 cm  FS: 34.9 %  LV mass(C)d: 119.8 grams  LV mass(C)dI: 66.5 grams/m2  Ao root diam: 3.3 cm  LA dimension: 4.2 cm     asc Aorta Diam: 2.9 cm  LA/Ao: 1.3  LVOT diam: 2.1 cm  LVOT area: 3.4 cm2  Ao root diam index Ht(cm/m): 2.0  Ao root diam index BSA (cm/m2): 1.8  Asc Ao diam index BSA (cm/m2): 1.6  Asc Ao diam index Ht(cm/m): 1.8  EF Biplane: 61.3 %  LA Volume Indexed (AL/bp): 44.0 ml/m2  RWT: 0.52     Doppler Measurements & Calculations  Ao V2 max: 100.0 cm/sec  Ao max P.0 mmHg  Ao V2 mean: 69.0 cm/sec  Ao mean P.1 mmHg  Ao V2 VTI: 18.6 cm  ALEX(I,D): 2.2 cm2  ALEX(V,D): 2.3 cm2  LV V1 max P.8 mmHg  LV V1 max: 67.0 cm/sec  LV V1 VTI: 12.1 cm  SV(LVOT): 41.0 ml  SI(LVOT): 22.8 ml/m2  TR max eldon: 347.0 cm/sec  TR max P.1 mmHg  AV Eldon Ratio (DI): 0.67  ALEX Index (cm2/m2): 1.2      ______________________________________________________________________________  Report approved by: Annemarie Saucedo 06/24/2024 02:06 PM             Consultations This Hospital Stay   PHARMACY TO DOSE WARFARIN    Primary Care Physician   Telly Bardales    Time Spent on this Encounter   I, Cyril Velasquez MD, personally saw the patient today and spent greater than 30 minutes discharging this patient.

## 2024-06-27 NOTE — PROGRESS NOTES
[facility]    Hospitalist Progress Note    Patient at this point is on comfort cares.  She has widely metastatic uterine cancer.  Received 1 round of chemotherapy only.  She has subsequently deteriorated significantly with renal failure hypotension hypoxia uncontrolled atrial fibrillation.  Appreciate oncology's help also.  Discussion with patient earlier she had been DNR/DNI.  Also discussed with her about making her comfortable and she was in agreement with that.  Family at this point is also excepted this.  She is comfort cares.  She really does not respond other than moving at times.  But does appear to be quite comfortable.  Will continue with morphine and Ativan as needed for comfort measures.  Do not expect her to survive much longer.  Family currently has no questions for me or concerns.    Diet: N.p.o. fluids: None    DVT Prophylaxis: Warfarin  Code Status: No CPR- Do NOT Intubate    Disposition: Expected discharge 1 to 2 days    Cyril Velasquez MD    Interval History   Patient sitting in bed which she rates on the lower side.  Does not appear to be in any obvious distress.  Family present.    -Data reviewed today: I reviewed all new labs and imaging results over the last 24 hours. I personally reviewed no images or EKG's today.    Physical Exam   Temp: 97.7  F (36.5  C) Temp src: Tympanic BP: (!) 81/44 Pulse: (!) 131   Resp: 12 SpO2: 98 % O2 Device: Nasal cannula Oxygen Delivery: 2 LPM  Vitals:    06/21/24 1757 06/26/24 0553   Weight: 75 kg (165 lb 5.5 oz) 77.5 kg (170 lb 13.7 oz)     Vital Signs with Ranges  Temp:  [97.7  F (36.5  C)] 97.7  F (36.5  C)  Pulse:  [106-146] 131  Resp:  [8-24] 12  BP: ()/(44-93) 81/44  FiO2 (%):  [40 %] 40 %  SpO2:  [97 %-100 %] 98 %  No intake/output data recorded.    Peripheral IV 06/26/24 Anterior;Distal;Left Lower forearm (Active)   Site Assessment WDL 06/26/24 1600   Line Status Infusing 06/26/24 1600   Dressing Transparent 06/26/24 1600   Dressing  Status clean;dry;intact 06/26/24 0800   Dressing Intervention Dressing reinforced 06/26/24 1600   Phlebitis Scale 0-->no symptoms 06/26/24 1600   Infiltration? no 06/26/24 1600   Number of days: 1       Wound Buttocks (Active)   Number of days: 1219     No line/device    Constitutional: Patient is sedate really no interactions does respond to touch.  Is in no obvious distress.      Mouth dry.  Tacky irregularly irregular rhythm  Shallow breath sounds clear anteriorly.  Abdomen soft no obvious tenderness.  Extremities are warm 2+ edema    Medications   Current Facility-Administered Medications   Medication Dose Route Frequency Provider Last Rate Last Admin    Patient is already receiving anticoagulation with heparin, enoxaparin (LOVENOX), warfarin (COUMADIN)  or other anticoagulant medication   Does not apply Continuous PRN Heron Cuellar MD         Current Facility-Administered Medications   Medication Dose Route Frequency Provider Last Rate Last Admin    sodium chloride (PF) 0.9% PF flush 3 mL  3 mL Intracatheter Q8H Heron Cuellar MD   3 mL at 06/25/24 0201       Data   Recent Labs   Lab 06/26/24  0446 06/25/24  2311 06/25/24  0508 06/24/24  0509   WBC 8.7  --  5.3 6.8   HGB 9.5*  --  9.1* 9.6*   MCV 96  --  95 93   *  --  110* 148*   INR 8.63*  --  7.45* 6.47*   *  --  133* 132*   POTASSIUM 5.3  --  4.5 4.4   CHLORIDE 95*  --  94* 95*   CO2 21*  --  23 23   BUN 71.2*  --  56.6* 40.7*   CR 2.80*  --  1.87* 1.14*   ANIONGAP 17*  --  16* 14   KORI 9.5  --  9.5 9.3   * 176* 195* 203*   ALBUMIN 2.6*  --  2.8*  --    PROTTOTAL 6.7  --  6.6  --    BILITOTAL 0.6  --  0.4  --    ALKPHOS 60  --  63  --    ALT 27  --  20  --    AST 39  --  25  --        No results found for this or any previous visit (from the past 24 hour(s)).

## 2024-06-27 NOTE — PROGRESS NOTES
[facility]    Spiritual Health Progress Note    Date of Service (when I saw the patient): 06/26/2024     Assessment & Plan   Kerri Gonsales is a 79 year old female who was admitted on 6/21/2024. On-call  visited with patient's family as a part of comfort care spiritual support.  Had a brief conversation and introductions to the family. Patient did respond with a few words. Had a devotional reading and prayed with the family. Gave the family a prayer resource and  info for further support if needed.    Rev. Rancho Daniels  Volunteer

## 2024-06-27 NOTE — PLAN OF CARE
Patient kept comfortable throughout the day with morphine.  Patient ceased breathing around 1550.  No heart tones auscultated soon after.  Dr. Velasquez pronounced death at 15:55.  Family at bedside grieving, when they leave they will be sent with all belongings other than those they wish to send to  home/cremation provider.

## 2024-06-28 ENCOUNTER — PATIENT OUTREACH (OUTPATIENT)
Dept: ONCOLOGY | Facility: OTHER | Age: 79
End: 2024-06-28

## 2024-06-28 NOTE — PROGRESS NOTES
[facility]: Cancer Care                                                                                        Patient discharged from program.       Signature:  Leticia Longoria RN

## (undated) RX ORDER — PROPOFOL 10 MG/ML
INJECTION, EMULSION INTRAVENOUS
Status: DISPENSED
Start: 2024-01-01

## (undated) RX ORDER — DEXMEDETOMIDINE HYDROCHLORIDE 100 UG/ML
INJECTION, SOLUTION INTRAVENOUS
Status: DISPENSED
Start: 2024-01-01